# Patient Record
Sex: MALE | Race: WHITE | NOT HISPANIC OR LATINO | Employment: OTHER | URBAN - METROPOLITAN AREA
[De-identification: names, ages, dates, MRNs, and addresses within clinical notes are randomized per-mention and may not be internally consistent; named-entity substitution may affect disease eponyms.]

---

## 2021-09-27 ENCOUNTER — VIRTUAL VISIT (OUTPATIENT)
Dept: NEUROLOGY | Facility: CLINIC | Age: 66
End: 2021-09-27
Payer: COMMERCIAL

## 2021-09-27 DIAGNOSIS — Z82.0 FAMILY HISTORY OF MYOTONIC DYSTROPHY: Primary | ICD-10-CM

## 2021-09-27 DIAGNOSIS — Z71.83 ENCOUNTER FOR NONPROCREATIVE GENETIC COUNSELING AND TESTING: ICD-10-CM

## 2021-09-27 DIAGNOSIS — R29.6 FALLS FREQUENTLY: ICD-10-CM

## 2021-09-27 DIAGNOSIS — Z13.71 ENCOUNTER FOR NONPROCREATIVE GENETIC COUNSELING AND TESTING: ICD-10-CM

## 2021-09-27 PROCEDURE — 99207 PR NO CHARGE LOS: CPT | Performed by: GENETIC COUNSELOR, MS

## 2021-09-27 PROCEDURE — 96040 PR GENETIC COUNSELING, EACH 30 MIN: CPT | Mod: TEL | Performed by: GENETIC COUNSELOR, MS

## 2021-09-27 NOTE — Clinical Note
Willis Bergman,  This patient currently has a telephone visit with me tomorrow at 8:30 AM. Would you be able to reschedule this for next Friday, 11/19, at 8:30 AM? No need to call him to confirm. Thanks!

## 2021-09-27 NOTE — PROGRESS NOTES
Name:  Rajni Lara  :   1955  MRN:   [id number]  Date of service: Sep 27, 2021  Referring Provider: Jacoby Holbrook    Genetic Counseling Consultation Note    Presenting Information:  A consultation in the [facility] was requested for Rajni, a 65 year old male, for evaluation of family history of Myotonic Dystrophy Type 2.  This consultation was requested by Jacoby Holbrook on 2021.    Rajni attended this visit conducted by phone alone.    History is obtained from Rajni and the medical record. I met with the family to obtain a personal and family history, discuss possible genetic contributions to his symptoms, and to obtain informed consent for genetic testing.    Personal History:   Rajni does not have a clinical diagnosis of Myotonic Dystrophy and he has never met with a Neurologist. He does report a history of falls. He reports that he takes a backwards fall about once every 3 months because he misses a step or isn't paying close attention. This isn't a new problem for Rajni, it has been happening for many years. Rajni reports some overall balance concerns and dizziness. Rajni has trouble getting up off of the floor, he has to get into a 4-point stance to get up.    Rajni has a longstanding cardiac history including non-ischemic cardiomyopathy which has resolved. He has a longstanding history of atrial flutter and has undergone 2 cardioversion's. He has not needed to meet with a Cardiologist for the past few years.    Rajni has bilateral cataracts and hearing loss. He reports that he is tired during the day and falls asleep immediately at night.    Relevant Imaging    Echocardiogram    2008    CONCLUSION: 1. Mild reduction in global LV systolic function with EF visually  estimated at 40-45%. 2. Mild dilatation of all four cardiac chambers. 3. No pericardial effusion. 4. Mild mitral insufficiency. 5. No prior study for comparison.    2/10/2009    Diastolic flow  patterns are consistent with normal LV relaxation and filling pressures. The left ventricular systolic function is normal. Ejection Fraction is estimated at  50-55%. There is trace mitral regurgitation. Compared to prior study dated 12/17/08, changes are noted.       3/20/2009    Diastolic flow patterns are consistent with normal LV relaxation and filling pressures. The left ventricular systolic function is normal. Ejection Fraction is estimated at  50-55%. There is trace mitral regurgitation. Compared to prior study dated 12/17/08, changes are noted.     6/28/2011    CONCLUSION: 1. Mild to moderate left atrial dilatation. 2. Moderate right atrial dilatation. 3. Global LV systolic function is normal. 4. No structural cardiac valve abnormality. 5. No pericardial effusion. 6. When compared to prior study dated 3/20/2009, there is no significant interval change.     8/1/2011    CONCLUSION: 1. Mild to moderate left atrial dilatation. 2. Moderate right atrial dilatation. 3. Global LV systolic function is normal. 4. No structural cardiac valve abnormality. 5. No pericardial effusion. 6. When compared to prior study dated 3/20/2009, there is no significant interval change.     Previous Genetic Testing  Rajni has never had genetic testing.    Family History:   A standard three generation pedigree was obtained and is scanned into the medical record.  History pertinent to referral is underlined.    Children:     Son, healthy. Reported to have negative genetic testing    Daughter, healthy. Reported to have negative genetic testing    2 healthy grandsons    Daughter, healthy. No history of genetic testing    Healthy grandson and granddaughter    Siblings:     Full siblings:     59 y/o brother, healthy    2 healthy nieces    65 y/o sister, history of Myotonic Dystrophy Type 2 and positive genetic test results for Myotonic Dystrophy Type 2 (Rajni emailed me a copy of these). Diagnosed at 61/63 y/o with symptom onset in 40s/50s  (falls and leg weakness). No diabetes or cardiac problems.    30 y/o nephew, healthy    Niece, healthy    Paternal:     Father: 91 y/o, healthy    Paternal aunts/uncles:     93 y/o uncle, healthy    Maternal:     Mother:  at 79 y/o d/t unknown cause. History of frequent falls and no leg strength. Presumed to have Myotonic Dystrophy Type 2    Maternal grandfather:  at 60 y/o d/t MI. History of leg weakness and pain which began in his mid 40s/50s. Presumed to have Myotonic Dystrophy Type 2    Maternal grandmother:  in 90s d/t natural causes. Potential history of cancer    Maternal aunts/uncles:     Uncle  at 67 y/o d/t heart attack. History of frequent falls and balance issues. Presumed to have Myotonic Dystrophy Type 2    Male first cousin in 50s. Limited information, but thought to have Myotonic Dystrophy Type 2 with symptom onset in 30s    Female first cousin, no information    There are no additional reports of family members with history of muscle weakness, pain, falls, cardiac problems, diabetes, autism, intellectual disability, developmental delays, birth defects, sudden cardiac death, history of genetic testing or known genetic condition. Paternal and maternal ancestry is of [facility] descent. Consanguinity is denied.     Genetic Counseling Discussion:  Myotonic Dystrophy Type 2 (DM2) is a rare genetic condition that affects many different body systems.  DM2 is primarily thought of as a neuromuscular condition with myotonia (70-90% of patients) and various muscle dysfunction (100% of patients).  Other less common symptoms of DM2 include subcapsular cataracts (50-80% of patients), cardiac conduction defects (10-20% of patients), and type 2 diabetes mellitus (25-75% of patients).  Other symptoms can include hyperhydrosis (20-30% of patients) and early-onset male frontal balding (20-50% of patients).  The onset of symptoms varies, but is most common in the 3rd to 4th decades of life.    DM2 is  caused by a repeat expansion of CCTG in the CNBP gene.  Disease causing repeat expansions are greater than 75 CCTG repeats. Unlike many repeat disorders, the size of the repeat expansion does not correlate with age of symptom onset.  DM2 is an autosomal dominant disorder.  Each person has two copies of their CNBP gene, they inherit one copy from their mother and one copy from their father.  An individual with DM2 has one normal copy of their CNBP gene and one copy with an expansion.  To date, all individuals with DM2 inherited the repeat expansion in the CNBP gene from a parent.  This means that for an individual identified to have DM2 from a genetic basis, each of their siblings and children has a 50% chance of inheriting the repeat expansion in CNBP and having DM2.  Genetic testing for DM2 is incredibly accurate, nearly 100% of individuals with DM2 and a pathogenic variant will be found to have an expansion in the CNBP gene; this test has a high detection rate.    Surveillance/Treatment:    Neurology  o Annually  o Weakness and myotonia    Occupational Therapy  o Annually    Physical therapy  o Annually  o Assistive devices for weakness    Ophthalmology  o Annually  o Evaluation for posterior subcapsular cataracts and epiretinal membranes    Cardiology  o Annually  o ECG, echocardiogram, 24-hour Holter monitoring, cardiac MRI    Endocrinology  o Annually  o Fasting serum glucose concentration, glycosylated hemoglobin level, thyroid hormone levels, vitamin D, serum testosterone, FSH    Based on the family history information provided (Myotonic Dystrophy Type 2 in Rajni's sister and mother), there is a 1/2 or 50% chance that Rajni also has a repeat expansion in CNBP and is at risk for symptoms of Myotonic Dystrophy. There is a 1/2 or 50% risk that Rajni does not have a repeat expansion in CNBP.     We reviewed that this testing is medically necessary based on Rajni's family history of Myotonic Dystrophy 2 and his  "personal symptoms. The results of genetic testing will direct guide Omarysol's medical history and are medically actionable. This information can also be used to clarify risks to family members.    Plan:  1. Rajni expressed verbal informed consent to proceed with genetic testing (CNBP repeat analysis).  I will mail a buccal collection kit to their home address.  Rajni will follow the included instructions and mail back to the laboratory.     The laboratory will conduct a benefits investigation for genetic testing.  If the estimated out of pocket cost is less than $100, genetic testing will commence immediately.  If the estimated out of pocket cost is greater than $100, Rajni will be contacted via phone call asking if he would like to proceed.  Rajni will be contacted 3 times.  If Rajni does not respond to these messages, genetic testing will be cancelled. Rajni is aware that this is only an estimation of benefits.    2. The results of genetic testing are available ~4 weeks after the sample is received by the laboratory.  I will call Rajni with the results when they are available.  3. Results will be disclosed on 11/12/21 at 8:30 AM.  4. Contact information provided.    Kamila Silva MS [facility]  Genetic Counselor  Email: [email or web address]  Phone: [phone]  Pager: [phone]    Total Time Spent in Consultation: Approximately 45 minutes    CC: No Letter    References:  CROW Mace., et al. \"Myotonic dystrophy type 2: molecular, diagnostic and clinical spectrum.\" Neurology 60.4 (2003): 657-664.    Luis SWANSON. Myotonic Dystrophy Type 2. 2006 Sep 21 [Updated 2020 Mar 19]. In: Jimbo BRUCE, Whitney HH, Cailin RA, et al., editors. Gene[employer]s  [Internet]. [place] ([place]): [facility]; [phone].    Addendum 11/11/2021  Results are not anticipated until 11/17/2021 per [facility]. Results disclosure visit moved to 11/19/21 at 8:30 AM  "

## 2021-09-27 NOTE — PROGRESS NOTES
Rajni is a 65 year old who is being evaluated via a billable telephone visit.      What phone number would you like to be contacted at? [phone]  How would you like to obtain your AVS? Mail a copy  Phone call duration:  minutes

## 2021-09-27 NOTE — Clinical Note
Willis Bergman,  Can you please create a telephone visit (15 min) for this patient on 11/12/21 at 8:30 AM? Thanks! It's a results disclosure.

## 2021-10-17 ENCOUNTER — HEALTH MAINTENANCE LETTER (OUTPATIENT)
Age: 66
End: 2021-10-17

## 2021-11-30 ENCOUNTER — TELEPHONE (OUTPATIENT)
Dept: CONSULT | Facility: CLINIC | Age: 66
End: 2021-11-30
Payer: COMMERCIAL

## 2021-11-30 NOTE — TELEPHONE ENCOUNTER
I called Rajni to discuss the results of genetic testing.  Our initial consultation occurred on 9/27/2021 where informed consent was obtained for genetic testing.    The results of CNBP repeat analysis were positive. 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 A repeat size of >75 was detected which is consistent with a pathogenic variant. A normal repeat seize of <25 was also     Myotonic Dystrophy Type 2 (DM2) is a rare genetic condition that affects many different body systems.  DM2 is primarily thought of as a neuromuscular condition with myotonia (70-90% of patients) and various muscle dysfunction (like weakness or stiffness, 100% of patients).  Other less common symptoms of DM2 include subcapsular cataracts (50-80% of patients), cardiac conduction defects (10-20% of patients), type 2 diabetes mellitus (25-75% of patients), and endocrine abnormalities (20% of patients).  Individuals with DM2 most commonly experience myotonia in the 3rd to 4th decades of life in combination with muscle pain and proximal and axial weakness of the neck and hip flexors.  Nearly everyone with DM2 will experience symptoms if they live to a typical age (~100% age-dependent penetrance).    Rajni indicated that he assumed these results would be positive. His questions center around what the next steps are (Neurology visit, etc.)    Personal History:   For additional details, review note on 9/27/2021 from myself.  To summarize, Rajni has a history of falls (once per every 3 months for several years), overall balance concerns and dizziness. Rajni has a longstanding cardiac history including non-ischemic cardiomyopathy which has resolved. He has a longstanding history of atrial flutter and has undergone 2 cardioversion's. He has not needed to meet with a Cardiologist for the past few years. Rajni has bilateral cataracts and hearing loss. He reports that he is tired during the day and falls asleep immediately at  night.    Family History:   A standard three generation pedigree was obtained and is scanned into the medical record at our consultation on 2021.    Healthy son, reported to have negative genetic testing    Healthy daughter, reported to have negative genetic testing    Healthy daughter, no history of genetic testing[place]    Healthy 61 y/o brother, no history of genetic testing    63 y/o sister, history of Myotonic Dystrophy Type 2 confirmed via genetic testing     mother with history of frequent falls and no leg strength, presumed to have Myotonic Dystrophy Type 2    Additional maternal relatives thought to have Myotonic Dystrophy Type 2     Recurrence Risk:  DM2 is caused by a repeat expansion of CCTG in the CNBP gene (sometimes referred to as the ZNF9 gene).  Disease causing repeat expansions are greater than 75 CCTG repeats. Unlike many repeat disorders like Myotonic Dystrophy Type 1, the size of the repeat expansion does not correlate with age of symptom onset.  DM2 is an autosomal dominant disorder.  Each person has two copies of their CNBP gene, they inherit one copy from their mother and one copy from their father.  An individual with DM2 has one normal copy of their CNBP gene and one copy with an expansion.  To date, all individuals with DM2 inherited the repeat expansion in the CNBP gene from a parent.  This means that for an individual identified to have DM2, each of their siblings and children has a 50% chance of inheriting the repeat expansion in CNBP and having DM2.  Genetic testing for DM2 is incredibly accurate, nearly 100% of individuals with DM2 and a pathogenic variant will be found to have an expansion in the CNBP gene; this test has a high detection rate.    We discussed that Rajni's family members are at risk for Myotonic Dystrophy Type 2 and the associated symptoms. Rajni untested daughter and untested brother who are at risk for Myotonic Dystrophy Type 2 who, at  minimum, should discuss this information with their primary care provider. These relatives could also consider genetic testing.    Medical Management:  Because DM2 is a condition that can affect many body systems, numerous referrals may be made by Rajni's neurologist.  These may include the following:    Neurology  ? Annually  ? Weakness and myotonia    Occupational Therapy  ? Annually    Physical therapy  ? Annually  ? Assistive devices for weakness    Ophthalmology  ? Annually  ? Evaluation for posterior subcapsular cataracts and epiretinal membranes    Cardiology  ? Annually  ? ECG, echocardiogram, 24-hour Holter monitoring, cardiac MRI    Endocrinology  ? Annually  ? Fasting serum glucose concentration, glycosylated hemoglobin level, thyroid hormone levels, vitamin D, serum testosterone, FSH    General  ? 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    Plan:   1. I will mail Rajni a copy of their test results along with a letter summarizing these results. Rajni should share this information with his other healthcare teams.  2. Rajni would like to establish care with our Neurology team here at the [facility]. I will start facilitating this process.    Kamila Silva MS [facility]  Genetic Counselor  Email: [email or web address]  Phone: [phone]  Pager: [phone]    Total Time Spent in Consultation: Approximately 25 minutes    CC: No Letter    References:  Luis SWANSON. Myotonic Dystrophy Type 2. 2006 Sep 21 [Updated 2020 Mar 19]. In: Jimbo MP, Whitney HH, Cailin RA, et al., editors. [employer]  [Internet]. [place] ([place]): [facility] [place]; [phone].

## 2021-11-30 NOTE — LETTER
November 30, 2021      TO: Rajni Lara  [address] [facility]  [place] [address]         Dear Rajni,    It was a pleasure meeting with you on November 30, 2021 in the Genetics and Metabolism Clinic at the [facility] for discussion of your recent genetic test results.  The purpose of this letter is to summarize the information we discussed and serve as a reference to yourself and other healthcare providers.    You can provide the supplemental letter to your family members to help them better understand your genetic condition and their risks.    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 A normal repeat seize of <25 was also detected.    We recognize that a new diagnosis of Myotonic Dystrophy Type 2 can bring many emotions, questions and concerns.  There are many great resources available.  Two are described below.    The Myotonic Dystrophy Foundation is a support organization specific to both types of Myotonic Dystrophy.  They have education materials, ways to become involved in the community and meet with families, and a monthly newsletter that you can sign up for.  Additionally, they have several toolkits to help you navigate health insurance considerations, applying for social security disability benefits, employment access, and much more.  o https://www.myotonic.org/  o https://www.myotonic.org/myotonic-dystrophy-start-your-journey-here    The Muscular Dystrophy Association (MDA) is a support organization that can provide many services to patients and families.  Their website is not specific to DM2, but they do have some information on this.  o http://mda.org    If you have not already, please establish care with a Neurologist who is familiar with this condition.  They will be able to make appropriate recommendations to best care for your health.  Share this information with your primary care  "provider as well.    It has a pleasure participating in your care.  While we do not have a formal follow-up visit scheduled, I am happy to answer any questions that you have.  My contact information is provided below.    Sincerely,                Kamila Silva MS [facility]  Licensed and Certified Genetic Counselor  IMTIAZ Pedro  Email: [email or web address]  Phone: [phone]      Dear Family Member,      You are receiving this letter because one of your family members was recently diagnosed with a genetic condition.  As a result, you are at risk to have the same genetic condition.  This condition is called Myotonic Dystrophy Type 2.  Please see below for details.  This letter contains a large amount of information.  Please read it carefully and share it with your primary care doctor, as it has implications for your health.      What is Myotonic Dystrophy Type 2?  Myotonic Dystrophy Type 2 is commonly abbreviated as  DM2.\"  It is a genetic condition that affects multiple body systems, including our muscles, eyes, heart, endocrine system, and central nervous system.  However, the symptoms of DM2 vary from person to person; even in the same family, not all individuals with DM2 will have similar severity of symptoms or age of onset.    Generally, DM2 is characterized by progressive loss of muscle mass and muscle weakness, especially in the hands, feet, face and neck. This condition is often associated with prolonged muscle contractions (myotonia) which most often occurs in the hands and makes releasing  difficult during activities such as opening doors and shaking hands with others. DM2 is also associated with development of cataracts early in life, frontal balding or hair thinning, abnormal heart rhythms, sleep apnea, and decreased muscle function in the digestive system that can lead to difficulty swallowing and constipation. Increased risk for type 2 diabetes and abnormal hormone levels have been seen in " individuals with DM2. There is also risk for poor reactions to anesthesia, so individuals with DM2 should share this diagnosis with their doctors and anesthesiologist prior to any surgery.     Importantly, there is another form of Myotonic Dystrophy that was not found in your family called Myotonic Dystrophy Type 1.  While these conditions are very similar, there are some key differences.  DM2 is sometimes described as less severe than DM1, although this is not meant to downplay health concerns individuals with DM2 face.  DM1 is associated with earlier onset with each generation affected and there is a severe infantile form of disease, while DM2 is only known to affect adults.    What causes Myotonic Dystrophy Type 2?  To answer this question, we need to review some basic biology and genetics information.  Our bodies are made up of cells.  These cells contain our chromosomes which are the structures that contain our genes.  Our genes create the proteins that help our bodies grow and function properly.  Our genes are made up of letters; As, Ts, Gs, and Cs.  We have 2 copies of almost every gene, and we inherit 1 copy from our mother and 1 copy from our father.  Myotonic Dystrophy Type 2 is caused by a change in one of our genes, specifically, the CNBP gene.    Everyone has a certain number of repeats of CCTG in their CNBP gene.  Individuals without DM2 have 26 or less repeats.  Individuals with DM2 have 75 or more repeats.  When there are 75 or more repeats, this causes the instructions in the CNBP gene to not work properly and results in the symptoms of DM2.  It's sometimes referred to as a repeat expansion.  The number of CCTG repeats an affected person has in their CNBP gene is generally NOT correlated with age of onset and severity of symptoms, which is unlike Myotonic Dystrophy Type 1.              How does Myotonic Dystrophy Type 2 run in families?  Myotonic Dystrophy Type 2 is an autosomal dominant condition.   This means that only 1 out of the 2 copies of the CNBP gene needs to have the change in the gene or repeat expansion.  This means that the repeat expansion is typically inherited from a parent.  It also means that the children of someone identified with DM2 are at risk to have children who are affected.  For each of their children, there is a   or 50% chance to inherit the repeat expansion and have DM2.    Essentially, each of first-degree relative of someone with DM2 has a 50% chance of having the same genetic change.  A first-degree relative is a child, sibling, or parent.  Thus, children, siblings, and parents to an individual with DM2 are thought to have a 50% or   chance of having the same condition.    Some families choose to prevent passing down a genetic condition to future generations. Now that the cause of Ojars and other family member's symptoms are known, additional pregnancy planning options are available. If family members are interested in learning more about these options, they can meet with a prenatal genetic counselor.    Why is it important for at risk family members to be tested for Myotonic Dystrophy Type 2?  At-risk family members should have genetic testing because it is the best way to know if someone does or does not have a risk to develop DM2 and the associated symptoms. Early identification and screening for this condition (before the onset of symptoms) is important because this allows doctors to identify and treat symptoms early.  Additionally, if an individual is affected with DM2, there is a 50% chance that they will pass this condition on to their children.  Genetic testing for DM2 is not recommended for individuals under the age of 18 because they are not expected to experience symptoms related to DM2 and should thus be able to make the choice about genetic testing when they are considered a legal adult.    Testing starts with meeting a genetic counselor to review the personal and  family history and discuss the benefits and limitations of genetic testing.  At this time, we also review insurance coverage for genetic testing.  Genetic testing requires a blood draw, or a saliva sample and results are typically obtained in 3 weeks.  If you are in [place], the genetic counseling team at the [facility] can facilitate this process, please see contact information below.  If you are located outside of [place], you can find a genetic counselor via [email or web address] or go through a telehealth company like [employer] ([email or web address]).  Even if you are not interested in genetic testing, you can meet with a genetic counselor to further clarify your risks.    I know I am at risk for Myotonic Dystrophy Type 2, so what are my next steps?  If you are receiving this information because a family member was identified to have DM2, there options available to you moving forward.    At minimum, share this information with your primary care provider.  This information will allow them to properly care for you and protect your health. They may order an annual EKG to monitor for risk of cardiac conduction abnormalities.  They may monitor your glucose and thyroid function.  Individuals with DM2 are at increased risk for complications from general anesthesia, even if they have very mild symptoms of DM2.  Make sure your family history of DM2 is known before any operations so pre-anesthetic assessment can be performed, and close monitoring is made during and after sedation.                Supplemental Information:  There is a federal law in placed called the Genetic Information Nondiscrimination Act (MARGARETH) of 2008.  MARGARETH is a federal law and applies to all [place] States. MARGARETH prohibits discrimination in health coverage and employment (employees and applicants) because of genetic information.  Genetic information refers to an individual's genetic tests  and family medical history (including family member's genetic tests).  For example, health insurers cannot request or require genetic information from a patient or the patient's family members or use this information for decisions regarding coverage, rates, or preexisting conditions.  It is illegal for a patient's health insurer to use family health history and genetic test results as a reason to deny health insurance or decide costs of health insurance.    There are exceptions to MARGARETH.  Most importantly, MARGARETH does not prevent health insurers from making decisions about eligibility and premiums based on a person's current symptoms or diagnosis of disease.  MARGARETH's protection applies to employers of 15 or more employees.  MARGARETH does not apply to life insurance, long term care insurance, and disability insurance.  MARGARETH does not apply to members of the [place]  who receive care through , Veterans who receive care through the VA, the [facility], and Federal Employees enrolled in the Federal Employee Health Benefits Plan.  These entities have other policies in place like MARGARETH.

## 2021-12-11 NOTE — TELEPHONE ENCOUNTER
RECORDS RECEIVED FROM: Internal   REASON FOR VISIT: Myotonic Dystrophy Type 2   Date of Appt: 1/10/22   NOTES (FOR ALL VISITS) STATUS DETAILS   OFFICE NOTE from referring provider Internal Kamila Silva GC @ [facility] Neurology:  9/27/21   OFFICE NOTE from other specialist N/A    DISCHARGE SUMMARY from hospital N/A    DISCHARGE REPORT from the ER N/A    OPERATIVE REPORT N/A    MEDICATION LIST Internal    IMAGING  (FOR ALL VISITS)     EMG N/A    EEG N/A    LUMBAR PUNCTURE N/A    IVON SCAN N/A    ULTRASOUND (CAROTID BILAT) *VASCULAR* N/A    MRI (HEAD, NECK, SPINE) N/A    CT (HEAD, NECK, SPINE) N/A

## 2022-01-10 ENCOUNTER — LAB (OUTPATIENT)
Dept: LAB | Facility: CLINIC | Age: 67
End: 2022-01-10
Payer: COMMERCIAL

## 2022-01-10 ENCOUNTER — OFFICE VISIT (OUTPATIENT)
Dept: NEUROLOGY | Facility: CLINIC | Age: 67
End: 2022-01-10
Payer: COMMERCIAL

## 2022-01-10 ENCOUNTER — PRE VISIT (OUTPATIENT)
Dept: NEUROLOGY | Facility: CLINIC | Age: 67
End: 2022-01-10

## 2022-01-10 VITALS
OXYGEN SATURATION: 100 % | SYSTOLIC BLOOD PRESSURE: 135 MMHG | DIASTOLIC BLOOD PRESSURE: 84 MMHG | WEIGHT: 222 LBS | HEART RATE: 56 BPM | RESPIRATION RATE: 16 BRPM

## 2022-01-10 DIAGNOSIS — G71.11 MYOTONIC DYSTROPHY, TYPE 2 (H): ICD-10-CM

## 2022-01-10 DIAGNOSIS — R13.10 DYSPHAGIA, UNSPECIFIED TYPE: ICD-10-CM

## 2022-01-10 DIAGNOSIS — G47.19 EXCESSIVE DAYTIME SLEEPINESS: ICD-10-CM

## 2022-01-10 DIAGNOSIS — G71.11 MYOTONIC MUSCULAR DYSTROPHY (H): Primary | ICD-10-CM

## 2022-01-10 DIAGNOSIS — R63.5 ABNORMAL WEIGHT GAIN: ICD-10-CM

## 2022-01-10 DIAGNOSIS — G71.11 MYOTONIC DYSTROPHY, TYPE 2 (H): Primary | ICD-10-CM

## 2022-01-10 DIAGNOSIS — G95.9 CERVICAL MYELOPATHY (H): ICD-10-CM

## 2022-01-10 DIAGNOSIS — G47.30 SLEEP APNEA, UNSPECIFIED TYPE: ICD-10-CM

## 2022-01-10 LAB — TSH SERPL DL<=0.005 MIU/L-ACNC: 2.6 MU/L (ref 0.4–4)

## 2022-01-10 PROCEDURE — 84443 ASSAY THYROID STIM HORMONE: CPT | Performed by: PATHOLOGY

## 2022-01-10 PROCEDURE — 94375 RESPIRATORY FLOW VOLUME LOOP: CPT | Performed by: INTERNAL MEDICINE

## 2022-01-10 PROCEDURE — 99204 OFFICE O/P NEW MOD 45 MIN: CPT | Performed by: PSYCHIATRY & NEUROLOGY

## 2022-01-10 PROCEDURE — 36415 COLL VENOUS BLD VENIPUNCTURE: CPT | Performed by: PATHOLOGY

## 2022-01-10 RX ORDER — ASPIRIN 81 MG/1
81 TABLET, CHEWABLE ORAL DAILY
COMMUNITY
End: 2023-01-29

## 2022-01-10 RX ORDER — FINASTERIDE 5 MG/1
1 TABLET, FILM COATED ORAL EVERY OTHER DAY
Status: ON HOLD | COMMUNITY
Start: 2021-11-06 | End: 2023-05-01

## 2022-01-10 RX ORDER — TAMSULOSIN HYDROCHLORIDE 0.4 MG/1
CAPSULE ORAL
COMMUNITY
Start: 2021-12-03

## 2022-01-10 RX ORDER — NYSTATIN AND TRIAMCINOLONE ACETONIDE 100000; 1 [USP'U]/G; MG/G
OINTMENT TOPICAL 2 TIMES DAILY
COMMUNITY

## 2022-01-10 RX ORDER — ACETAMINOPHEN 500 MG
1000 TABLET ORAL
COMMUNITY

## 2022-01-10 ASSESSMENT — PAIN SCALES - GENERAL: PAINLEVEL: NO PAIN (0)

## 2022-01-10 NOTE — PATIENT INSTRUCTIONS
Labs today  Our physical and speech therapists will be contacting you.   If a slot is available we will try to get pulmonary function tests today.   Cardiology referral to the [facility]  Sleep Medicine referral, you can do this in [facility]  Continue seeing your Eye doctor once a year  Follow up 1 year

## 2022-01-10 NOTE — NURSING NOTE
Chief Complaint   Patient presents with     Consult     [facility] MUSC[facility] DYSTROPHY     Micah Bearden

## 2022-01-10 NOTE — PROGRESS NOTES
Service Date: 01/10/2022    Christopher Field MD  [facility]  [address] [place] [id number]    RE:  Rajni Lara  MRN: [id number]  : 1955    Dear Dr. Field:    I had the pleasure to see Mr. Lara at the [facility].  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  He underwent testing on 10/24/2021.  He met our genetic counselor as he has a strong family history of this condition affecting his sister, presumably mother and maternal grandfather.    He began noticing some proximal leg weakness about 3-4 years ago.  It is worse in the last 12 months.  He has difficulty getting up from low-seated chairs, from the ground when he plays with his grandkids , nd ascending stairs, which requires using a handrail now. He occasionally will trip on his foot with walking.  He has had 2 falls in total, one about 13 months ago and one six months ago in his garage, but none since.  He has a cane, but does not use it regularly. He endorses some mild weakness of his proximal arm muscles, especially when lifting heavy objects and trying to put them on shelves.  He cannot put on a tall shelf anything more than 5-10 pounds now.  He does not report any difficulty gripping objects and he does not have striking hand  myotonia.  Rarely, his  will get stuck when he gives a hand shake, but that is uncommon.      He has prominent hand tremor, which is benign/essential and this affects his dexterity and causing some clumsiness occasionally dropping things.  He also reports some difficulty swallowing.  When he swallows hamburgers and French fries quickly, he feels like he cannot breathe and this actually happened 13 months ago and led to a syncopal episode.  This does occur a couple times a week and is disturbing as it can happen with liquids too.  He has not had a formal speech-swallow evaluation.      He has cataracts in both eyes operated in 2019.  He  "is following with an eye doctor annually.  He does not take naps during the day, but feels easily fatigued and falls immediately asleep when he is about to.  He describes his sleep as refreshing.  His wife tells me he does snore to a moderate degree and he is \"gasping for air\" at times.  He does not have restless leg syndrome. He has never had a sleep study.  He does not wake up in the morning with headaches.  He denies orthopnea or significant dyspnea on exertion.      He has a history of atrial flutter that was successfully ablated in the past and there was a history of nonischemic cardiomyopathy with reduced EF seen on prior echos, which according to the patient has resolved- his echo normalized and he does not take any cardiologic drugs.  He was on anticoagulation before ablation of the flutter for 6-8 weeks, as well as 4 weeks thereafter.  He does not have prominent GI complaints except for occasional constipation, which he feels could be caused by his prostate drugs.  He has urinary retention and occasional leaks, which are caused by BPH and finasteride has helped a lot.      He does not have any history of diabetes.  He had a hemoglobin A1c checked in 09/2021 that was normal 5.3.  Lipid panel showed mildly elevated triglycerides at 166, but LDL was 111.  Comprehensive metabolic panel was essentially normal except for a BUN to creatinine ratio of 25 and a minimal elevation of AST at 44.    /84   Pulse 56   Resp 16   Wt 100.7 kg (222 lb)   SpO2 100%     NEUROLOGIC EXAMINATION:  He is awake, alert and oriented x3.  He is able to provide a coherent history.  Cranial nerves show no ptosis or ophthalmoparesis.  Ductions and versions of the eyes are normal.  There is no weakness of orbicularis oculi, oris, uvula, jaw, palate or tongue.  No dysphonia or dysarthria.  His neck flexion is 4/5, extension is full.  Strength for deltoid is 5- bilaterally, biceps are bilaterally 5, triceps bilaterally 4.  Wrist " extensors, finger extensors, FDI, APB and hand  are bilaterally 5.  Hip flexors are bilaterally 4, hip abduction is 4+, knee extension, flexion and foot dorsiflexion are bilaterally 5. Tone is normal in the upper and lower extremities.  Reflexes are 3+ brisk in biceps, triceps, brachioradialis, knees and ankles with vertical spread in all.  There is no ankle clonus and toes are downgoing; left equivocal, mute right.  He does not have a brisk jaw jerk.  There are no fasciculations.  I did not detect any hand  myotonia.  There was perhaps very subtle percussion myotonia of the thenar and the EDC bilaterally.  Sensory exam showed reduced vibration at the toes- only a couple of seconds-and it was normal at the medial malleoli and upper extremities.  Position sensation was intact at the toes.  Finger-to-nose was done without dysmetria, although there was quite prominent postural and kinetic tremor bilaterally consistent with essential tremor.  He could not get up from a chair with arms crossed on the chest.  He had to use his arms to push.  His gait was fairly unremarkable, not spastic or Trendelenburg.  Romberg was equivocal and he had slight difficulties doing tandem gait.    In summary, Mr. Lara has myotonic dystrophy type 2.  We discussed a number of practical issues and expected natural history, diagnosis and prognosis of DM2.  I told him that this is a hereditary disease, which is autosomal dominant, so his children have a 50% chance of getting this trait.  I explained that this is a muscular dystrophy with onset in later life with weakness beginning usually in the 40s-50s and is slowly progressive.  A number of people can stay ambulatory until the later stage of the disease; however, there is no disease-modifying treatment currently available.  I also explained to him that this is a multisystem disorder and for this reason, surveillance of multiple systems is required.  I will refer him to our  Cardiology Department at the [facility] who has an interest in neuromuscular diseases and they may order a cardiac MRI or Holter per their judgment.  He should see his eye doctor annually.  I will also refer him to a Sleep Clinic locally in [place] because he is complaining of snoring and his wife as noted some gasping for air at night.  He may have sleep apnea and he probably has also excessive daytime sleepiness, based on his report.  Those problems are common in myotonic dystrophy type 2 and should be addressed.  I will check a TSH.  Otherwise, he had complete labs a few months ago that were unrevealing.  Hemoglobin A1c and metabolic panel probably should be repeated annually.     He does not have any major GI complaints.  Regarding his proximal leg weakness, I will ask our physical therapist to assess him remotely and give some advice on optimal exercise and need for continued outpatient therapies if deemed necessary.  It is also important that our speech therapist assess him remotely.  If they think that he needs a video swallow study, this could be done locally in [place].  We should also obtain PFT (spirometry) today if possible.     Lastly, I noted that he has hyperreflexia not including the jaw, and loss of vibration at the toes with unsteady gait. This could be due to cervical myelopathy and for that reason I will obtain a cervical spine MRI.     Follow up in 1 year or sooner if needed.    Total time spent on this encounter today was 53 minutes including 33 face-to-face, 10 on post-visit note dictation, editing and orders, 10 on pre-visit chart review.    Sincerely,    Reinaldo Rodriguez MD        D: 01/10/2022   T: 01/10/2022   MT: LAUREN    Name:     DERIAN MORALES  MRN:      [id number]        Account:      [id number]   :      1955           Service Date: 01/10/2022       Document: [id number]    cc:  Christopher Field MD

## 2022-01-10 NOTE — LETTER
1/10/2022       RE: Rajni Lara  [address]   [place] [address]     Dear Colleague,    Thank you for referring your patient, Rajni Lara, to the [facility] [place] at [facility]. Please see a copy of my visit note below.    Service Date: 01/10/2022    Christopher Field MD  [facility]  [address]  [place] [address]    RE:  Rajni Lara  MRN: [id number]  : 1955    Dear Dr. Field:    I had the pleasure to see Mr. Lara at the [facility].  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  He underwent testing on 10/24/2021.  He met our genetic counselor as he has a strong family history of this condition affecting his sister, presumably mother and maternal grandfather.    He began noticing some proximal leg weakness about 3-4 years ago.  It is worse in the last 12 months.  He has difficulty getting up from low-seated chairs, from the ground when he plays with his grandkids , nd ascending stairs, which requires using a handrail now. He occasionally will trip on his foot with walking.  He has had 2 falls in total, one about 13 months ago and one six months ago in his garage, but none since.  He has a cane, but does not use it regularly. He endorses some mild weakness of his proximal arm muscles, especially when lifting heavy objects and trying to put them on shelves.  He cannot put on a tall shelf anything more than 5-10 pounds now.  He does not report any difficulty gripping objects and he does not have striking hand  myotonia.  Rarely, his  will get stuck when he gives a hand shake, but that is uncommon.      He has prominent hand tremor, which is benign/essential and this affects his dexterity and causing some clumsiness occasionally dropping things.  He also reports some difficulty swallowing.  When he swallows hamburgers and French fries quickly, he  "feels like he cannot breathe and this actually happened 13 months ago and led to a syncopal episode.  This does occur a couple times a week and is disturbing as it can happen with liquids too.  He has not had a formal speech-swallow evaluation.      He has cataracts in both eyes operated in 07/2019.  He is following with an eye doctor annually.  He does not take naps during the day, but feels easily fatigued and falls immediately asleep when he is about to.  He describes his sleep as refreshing.  His wife tells me he does snore to a moderate degree and he is \"gasping for air\" at times.  He does not have restless leg syndrome. He has never had a sleep study.  He does not wake up in the morning with headaches.  He denies orthopnea or significant dyspnea on exertion.      He has a history of atrial flutter that was successfully ablated in the past and there was a history of nonischemic cardiomyopathy with reduced EF seen on prior echos, which according to the patient has resolved- his echo normalized and he does not take any cardiologic drugs.  He was on anticoagulation before ablation of the flutter for 6-8 weeks, as well as 4 weeks thereafter.  He does not have prominent GI complaints except for occasional constipation, which he feels could be caused by his prostate drugs.  He has urinary retention and occasional leaks, which are caused by BPH and finasteride has helped a lot.      He does not have any history of diabetes.  He had a hemoglobin A1c checked in 09/2021 that was normal 5.3.  Lipid panel showed mildly elevated triglycerides at 166, but LDL was 111.  Comprehensive metabolic panel was essentially normal except for a BUN to creatinine ratio of 25 and a minimal elevation of AST at 44.    /84   Pulse 56   Resp 16   Wt 100.7 kg (222 lb)   SpO2 100%     NEUROLOGIC EXAMINATION:  He is awake, alert and oriented x3.  He is able to provide a coherent history.  Cranial nerves show no ptosis or " ophthalmoparesis.  Ductions and versions of the eyes are normal.  There is no weakness of orbicularis oculi, oris, uvula, jaw, palate or tongue.  No dysphonia or dysarthria.  His neck flexion is 4/5, extension is full.  Strength for deltoid is 5- bilaterally, biceps are bilaterally 5, triceps bilaterally 4.  Wrist extensors, finger extensors, FDI, APB and hand  are bilaterally 5.  Hip flexors are bilaterally 4, hip abduction is 4+, knee extension, flexion and foot dorsiflexion are bilaterally 5. Tone is normal in the upper and lower extremities.  Reflexes are 3+ brisk in biceps, triceps, brachioradialis, knees and ankles with vertical spread in all.  There is no ankle clonus and toes are downgoing; left equivocal, mute right.  He does not have a brisk jaw jerk.  There are no fasciculations.  I did not detect any hand  myotonia.  There was perhaps very subtle percussion myotonia of the thenar and the EDC bilaterally.  Sensory exam showed reduced vibration at the toes- only a couple of seconds-and it was normal at the medial malleoli and upper extremities.  Position sensation was intact at the toes.  Finger-to-nose was done without dysmetria, although there was quite prominent postural and kinetic tremor bilaterally consistent with essential tremor.  He could not get up from a chair with arms crossed on the chest.  He had to use his arms to push.  His gait was fairly unremarkable, not spastic or Trendelenburg.  Romberg was equivocal and he had slight difficulties doing tandem gait.    In summary, Mr. Lara has myotonic dystrophy type 2.  We discussed a number of practical issues and expected natural history, diagnosis and prognosis of DM2.  I told him that this is a hereditary disease, which is autosomal dominant, so his children have a 50% chance of getting this trait.  I explained that this is a muscular dystrophy with onset in later life with weakness beginning usually in the 40s-50s and is slowly  progressive.  A number of people can stay ambulatory until the later stage of the disease; however, there is no disease-modifying treatment currently available.  I also explained to him that this is a multisystem disorder and for this reason, surveillance of multiple systems is required.  I will refer him to our Cardiology Department at the [facility] who has an interest in neuromuscular diseases and they may order a cardiac MRI or Holter per their judgment.  He should see his eye doctor annually.  I will also refer him to a Sleep Clinic locally in [place] because he is complaining of snoring and his wife as noted some gasping for air at night.  He may have sleep apnea and he probably has also excessive daytime sleepiness, based on his report.  Those problems are common in myotonic dystrophy type 2 and should be addressed.  I will check a TSH.  Otherwise, he had complete labs a few months ago that were unrevealing.  Hemoglobin A1c and metabolic panel probably should be repeated annually.     He does not have any major GI complaints.  Regarding his proximal leg weakness, I will ask our physical therapist to assess him remotely and give some advice on optimal exercise and need for continued outpatient therapies if deemed necessary.  It is also important that our speech therapist assess him remotely.  If they think that he needs a video swallow study, this could be done locally in [place].  We should also obtain PFT (spirometry) today if possible.     Lastly, I noted that he has hyperreflexia not including the jaw, and loss of vibration at the toes with unsteady gait. This could be due to cervical myelopathy and for that reason I will obtain a cervical spine MRI.     Follow up in 1 year or sooner if needed.    Total time spent on this encounter today was 53 minutes including 33 face-to-face, 10 on post-visit note dictation, editing and orders, 10 on pre-visit chart review.      Reinaldo Rodriguez MD        D:  01/10/2022   T: 01/10/2022   MT: LAUREN    Name:     DERIAN MORALES  MRN:      [id number]        Account:      [id number]   :      1955           Service Date: 01/10/2022       Document: [id number]    cc:  Christopher Field MD         Again, thank you for allowing me to participate in the care of your patient.      Sincerely,    Reinaldo Rodriguez MD

## 2022-01-12 LAB
EXPTIME-PRE: 6.9 SEC
FEF2575-%PRED-PRE: 82 %
FEF2575-PRE: 2.45 L/SEC
FEF2575-PRED: 2.98 L/SEC
FEFMAX-%PRED-PRE: 92 %
FEFMAX-PRE: 9.2 L/SEC
FEFMAX-PRED: 9.9 L/SEC
FEV1-%PRED-PRE: 74 %
FEV1-PRE: 2.93 L
FEV1FEV6-PRE: 78 %
FEV1FEV6-PRED: 78 %
FEV1FVC-PRE: 78 %
FEV1FVC-PRED: 76 %
FIFMAX-PRE: 5.67 L/SEC
FVC-%PRED-PRE: 72 %
FVC-PRE: 3.78 L
FVC-PRED: 5.2 L
MEP-PRE: 77 CMH2O
MIP-PRE: -40 CMH2O

## 2022-01-20 ENCOUNTER — HOSPITAL ENCOUNTER (OUTPATIENT)
Dept: SPEECH THERAPY | Facility: CLINIC | Age: 67
Setting detail: THERAPIES SERIES
End: 2022-01-20
Attending: PSYCHIATRY & NEUROLOGY
Payer: COMMERCIAL

## 2022-01-20 PROCEDURE — 92610 EVALUATE SWALLOWING FUNCTION: CPT | Mod: GN | Performed by: SPEECH-LANGUAGE PATHOLOGIST

## 2022-01-20 PROCEDURE — 92526 ORAL FUNCTION THERAPY: CPT | Mod: GN | Performed by: SPEECH-LANGUAGE PATHOLOGIST

## 2022-01-27 NOTE — PROGRESS NOTES
Adult Outpatient Dysphagia Evaluation   01/20/22 1100       Present No   General Information   Type Of Visit Initial   Start Of Care Date 01/20/22   Referring Physician Reinaldo Rodriguez MD   Orders Evaluate And Treat   Orders Comment Dysphagia. Clinical Swallow Study.   Medical Diagnosis   (Myotonic Muscular Dystrophy)   Onset Of Illness/injury Or Date Of Surgery 01/10/22  (Order date.)   Precautions/limitations No Known Precautions/limitations   Hearing Bilateral hearing aids.   Pertinent History of Current Problem/OT: Additional Occupational Profile Info LESLIE presents with concerns of a sensation of tightness that occurs when he is eating dry, solid foods. This happens only when he is eating and occurs between 1-3 times/month. Approximately a year ago he had one of these episodes that resulted in OJ passing out and coming to with paramedics around him. Since then he has been very cautious and when he starts to feel the sensation starting he stops eating. He reports the sensation typically clears after about a minute.   Respiratory Status Room air   Prior Level Of Function Swallowing   Prior Level Of Function Comment Previously had no difficulty.   Patient Role/employment History Retired  (Retail pharmacist.)   Living Environment [place]  (With his wife, Desire.)   Patient/family Goals To find out if there's a way to avoid the sensation he feels when swallowing that has lead to him passing out.   Fall Risk Screen   Fall screen completed by SLP   Have you fallen 2 or more times in the past year? Yes   Have you fallen and had an injury in the past year? No   Is patient a fall risk? Yes   Fall screen comments LESLIE has a PT evaluation scheduled.   Abuse Screen (yes response referral indicated)   Feels Unsafe at Home or Work/School no   Feels Threatened by Someone no   Does Anyone Try to Keep You From Having Contact with Others or Doing Things Outside Your Home? no   Physical Signs of Abuse  Present no   Clinical Swallow Evaluation   Oral Musculature generally intact   Structural Abnormalities none present   Dentition present and adequate   Mucosal Quality good   Mandibular Strength and Mobility intact   Oral Labial Strength and Mobility WFL   Lingual Strength and Mobility WFL   Velar Elevation intact   Buccal Strength and Mobility intact   Laryngeal Function Cough;Throat clear;Swallow   Oral Musculature Comments No structural or strength concerns at this time.   Additional Documentation No   Additional evaluation(s) completed today No   Rationale for completing additional evaluation PO trials were not completed given that OJs symptoms are a sensation he feels with no outward physical signs.   VFSS Evaluation   VFSS Additional Documentation No   FEES Evaluation   Additional Documentation No   Educational Assessment   Barriers to Learning No barriers   Preferred Learning Style Reading  (To provide materials for later recall.)   Esophageal Phase of Swallow   Patient reports or presents with symptoms of esophageal dysphagia No   General Therapy Interventions   Planned Therapy Interventions Dysphagia Treatment   Dysphagia treatment Oropharyngeal exercise training   Intervention Comments Exercises to be recommended at a reduced rate given his diagnosis of Myotonic Muscular Dystrophy   Swallow Eval: Clinical Impressions   Skilled Criteria for Therapy Intervention Skilled criteria met.  Treatment indicated.   Functional Assessment Scale (FAS) 5   Dysphagia Outcome Severity Scale (GUERRERO) Level 5 - GUERRERO   Treatment Diagnosis Mild oropharyngeal dysphagia characterized by episodes of chest tightness.   Diet texture recommendations Regular diet;Thin liquids (level 0)   Rehab Potential good, to achieve stated therapy goals   Therapy Frequency other (see comments)  (Follow up in 3 weeks.)   Predicted Duration of Therapy Intervention (days/wks) 1 month   Anticipated Discharge Disposition home   Risks and Benefits of  Treatment have been explained. Yes   Patient, family and/or staff in agreement with Plan of Care Yes   Clinical Impression Comments LESLIE presents with mild oropharyngeal dysphagia characterized by episodes of chest tightness that in the past have lead to OJ losing consciousness. He has been managing this well by stopping eating when the sensation occurs and the sensation eventually clears. A trial of reduced frequency oropharyngeal exercises will be trialed to determine if they have any benefit for OJ.   Swallow Goals   SLP Swallow Goals 1   Swallow Goal 1   Goal Identifier Oropharyngeal Exercises   Goal Description LESLIE will complete oropharyngeal exercises (effortful swallow and tongue hold) with reduced frequency due to his medical diagnosis on a daily basis for 3 weeks to assess if a decrease can occur in the frequency of the sensation of tightness he experiences in his chest.   Target Date 02/20/22   Total Session Time   SLP Trish: oral/pharyngeal swallow function, clinical minutes (89825) 30   Total Evaluation Time 30     Teresita Gaviria MA, [facility]  [facility]  [phone] (Phone)  [phone] (Fax)

## 2022-01-27 NOTE — PROGRESS NOTES
[facility]      OUTPATIENT SPEECH LANGUAGE PATHOLOGY SWALLOW  EVALUATION  PLAN OF TREATMENT FOR OUTPATIENT REHABILITATION  (COMPLETE FOR INITIAL CLAIMS ONLY)  Patient's Last Name, First Name, M.I.  YOB: 1955  Rajni Lara       Provider s Name:      Medical Record No.  [facility]    [id number]    Onset Date: 1/10/2022    Start of Care Date: 1/20/2022        Type:     ___PT   __OT   _X_SLP   Medical Diagnosis: Myotonic Muscular Dystrophy        Therapy Diagnosis: Dysphagia    Visits from SOC: 1          _________________________________________________________________________________  Plan of Treatment/Functional Goals:     Goal Identifier Oropharyngeal Exercises   Goal Description OJ will complete oropharyngeal exercises (effortful swallow and tongue hold) with reduced frequency due to his medical diagnosis on a daily basis for 3 weeks to assess if a decrease can occur in the frequency of the sensation of tightness he experiences in his chest.   Target Date 02/20/22   Date Met      Progress (detail required for progress note):         Therapy Frequency:   1 follow up planned in 3 weeks.    Teresita House, SLP       I CERTIFY THE NEED FOR THESE SERVICES FURNISHED UNDER        THIS PLAN OF TREATMENT AND WHILE UNDER MY CARE     (Physician co-signature of this document indicates review and certification of the therapy plan).                Certification Date From:   1/20/2022  Certification Date To:  2/20/2022    Referring Provider:   Reinaldo Rodriguez MD    Initial Assessment        See Epic Evaluation

## 2022-02-10 ENCOUNTER — HOSPITAL ENCOUNTER (OUTPATIENT)
Dept: PHYSICAL THERAPY | Facility: CLINIC | Age: 67
Setting detail: THERAPIES SERIES
End: 2022-02-10
Payer: COMMERCIAL

## 2022-02-10 ENCOUNTER — HOSPITAL ENCOUNTER (OUTPATIENT)
Dept: SPEECH THERAPY | Facility: CLINIC | Age: 67
Setting detail: THERAPIES SERIES
End: 2022-02-10
Attending: PSYCHIATRY & NEUROLOGY
Payer: COMMERCIAL

## 2022-02-10 DIAGNOSIS — G71.11 MYOTONIC DYSTROPHY, TYPE 2 (H): ICD-10-CM

## 2022-02-10 PROCEDURE — 97161 PT EVAL LOW COMPLEX 20 MIN: CPT | Mod: GP | Performed by: PHYSICAL THERAPIST

## 2022-02-10 PROCEDURE — 97110 THERAPEUTIC EXERCISES: CPT | Mod: GP | Performed by: PHYSICAL THERAPIST

## 2022-02-10 PROCEDURE — 92526 ORAL FUNCTION THERAPY: CPT | Mod: GN | Performed by: SPEECH-LANGUAGE PATHOLOGIST

## 2022-02-10 NOTE — PROGRESS NOTES
Functional Gait Assessment (FGA): The FGA assesses postural stability during various walking tasks.   Gait assistive device used: None    20/30    Scores of <22 /30 have been correlated with predicting falls in community-dwelling older adults according to Bob & Diego 2010.   Scores of <18 /30 have been correlated with increased risk for falls in patients with Parkinsons Disease according to Amrik Hawkins Zhou et al 2014.  Minimal Detectable Change for patients with acute/chronic stroke = 4.2 according to Thikiki & Ritschel 2009  Minimal Detectable Change for patients with vestibular disorder = 8 according to Bob Cortez 2010    Assessment (rationale for performing, application to patient s function & care plan): Performed FGA in order to assess balance with gait. Patient scoring a risk of fall as indicated by score that was <23 on the test. He has difficulty with higher level tasks such as backwards walking and close eyes. He was able to do stairs with step to pattern instead of reciprocal.   (Minutes billed as physical performance test)       02/10/22 1500   Signing Clinician's Name / Credentials   Signing clinician's name / credentials Debby Rucker, PT, DPT    Functional Gait Assessment (KYRA Rojas., Daniel GLorin F., et al. (2004))   1. GAIT LEVEL SURFACE 3   2. CHANGE IN GAIT SPEED 3   3. GAIT WITH HORIZONTAL HEAD TURNS 2   4. GAIT WITH VERTICAL HEAD TURNS 2   5. GAIT AND PIVOT TURN 3   6. STEP OVER OBSTACLE 2   7. GAIT WITH NARROW BASE OF SUPPORT 2   8. GAIT WITH EYES CLOSED 1   9. AMBULATING BACKWARDS 1   10. STEPS 1   Total Functional Gait Assessment Score   TOTAL SCORE: (MAXIMUM SCORE 30) 20

## 2022-02-10 NOTE — PROGRESS NOTES
02/10/22 1400   Quick Adds   Quick Adds Certification   Type of Visit Initial OP PT Evaluation   General Information   Start of Care Date 02/10/22   Referring Physician Reinaldo Rodriguez MD    Orders Evaluate and Treat as Indicated   Order Date 01/10/22   Medical Diagnosis myotonic dystrophy    Onset of illness/injury or Date of Surgery 01/10/22   Surgical/Medical history reviewed Yes  (cataracts, atrial flutter w/ ablation, BPH )   Pertinent history of current problem (include personal factors and/or comorbidities that impact the POC) Recent dx of myotonic dystrophy type II but was noticing increased weakness over the last 3-4 years. Sister also has disease. He has more trouble with getting up out of chairs, getting up off the floor when playing with grandkids, supine to sit/bed mobility, stairs. He will shuffle his feet when walking and does not feel like he could walk heel to toe easily.    Prior level of function comment did PT for his back issues and doing back exercises; biking on exercise bike 30-40 minutes 3-4 days per week; watching grandkids 2 days per week; uses 5 lb weights for some exercises as well    Patient role/Employment history Retired   Living environment [place]   Home/Community Accessibility Comments stairs, 14 up and 14 to basement, using the rail for these doing 1 step at at time; Spouse lives at home with him    Assistive Devices Comments they have higher toilet seats and are looking for some chairs/surfaces that are higher to make getting up easier    Patient/Family Goals Statement improve his strength and get exercising, easier time with transfers    Fall Risk Screen   Fall screen completed by PT   Have you fallen 2 or more times in the past year? Yes   Have you fallen and had an injury in the past year? No   Timed Up and Go score (seconds) not tested    Is patient a fall risk? Yes   Fall screen comments does have falls, tripping over things and not catching himself-- see  FGA, at risk of falls as indicated by this test    Abuse Screen (yes response referral indicated)   Feels Unsafe at Home or Work/School no   Feels Threatened by Someone no   Does Anyone Try to Keep You From Having Contact with Others or Doing Things Outside Your Home? no   Physical Signs of Abuse Present no   Pain   Pain comments denies any pain today at start of session; later in session noticed the R side of low back had some pain but was able to work through it    Cognitive Status Examination   Orientation orientation to person, place and time   Level of Consciousness alert   Follows Commands and Answers Questions 100% of the time   Personal Safety and Judgment intact   Memory intact   Posture   Posture Comments forward shoulder posture and head position    Range of Motion (ROM)   ROM Comment grossly WFLs    Strength   Strength Comments 3+/5 L hip flexors, 4/5 R hip flexors; 4-/5 shoulder flexors; glute weakness as noted with decreased ROM for bridge; overall core weakness observed with watching bed mobility    Bed Mobility   Bed Mobility Comments needed Mod A from PT from supine<>sit today d/t core and hip weakness    Transfer Skills   Transfer Comments sit to stand with need for UEs on any chair <24 inches in height; from 24'' he can do without his UEs    Gait   Gait Comments ambulates with B arm swing and proper foot clearance for short distances    Gait Special Tests   Gait Special Tests 25 FOOT TIMED WALK;FUNCTIONAL GAIT ASSESSMENT   Gait Special Tests 25 Foot Timed Walk   Seconds 6.97   Steps 12 Steps   Gait Special Tests Functional Gait Assessment Score out of 30   Score out of 30 20   Comments see progress note    Balance Special Tests   Balance Special Tests Sit to stand reps;Single leg stance right;Single leg stance left   Balance Special Tests Single Leg Stance Right,   Right, seconds 2 Seconds   Balance Special Tests Single Leg Stance Left   Left, seconds 2 Seconds   Balance Special Tests Sit to Stand  Reps in 30 Seconds   Comments 23.25 5 sit to stand (24 inch chair)    Sensory Examination   Sensory Perception Comments denies any sensory changes    Planned Therapy Interventions   Planned Therapy Interventions IADL retraining;balance training;gait training;neuromuscular re-education;ROM;strengthening;stretching;transfer training   Clinical Impression   Criteria for Skilled Therapeutic Interventions Met yes, treatment indicated   PT Diagnosis proximal muscle weakness with impaired mobility    Influenced by the following impairments decreased strength, balance impairments, fatigue    Functional limitations due to impairments unable to easily mobilize at home and in the community    Clinical Presentation Stable/Uncomplicated   Clinical Presentation Rationale stable medically, PT impairments, clinical judgement    Clinical Decision Making (Complexity) Low complexity   Therapy Frequency other (see comments)  (every 3-4 weeks to progress HEP )   Predicted Duration of Therapy Intervention (days/wks) up to 90 days   Risk & Benefits of therapy have been explained Yes   Patient, Family & other staff in agreement with plan of care Yes   Clinical Impression Comments Patient presents with new dx of myotonic dystrophy but onset of weakness noted several years ago. Patient proximally week as to be exepected with this disease and will benefit from PT in order to work on progressing/maintaining strength and work on balance. Patient will be seen every 3 weeks to progress his HEP.    GOALS   PT Eval Goals 2;1;3;4   Goal 1   Goal Identifier sit to stand   Goal Description OJ will perform sit to stand from 22'' or < surface x 5 reps without use of UEs in order to show progression with his strength to improve ease of transfers.    Target Date 04/10/22   Goal 2   Goal Identifier FGA   Goal Description OJ will improve score on FGA from 20 to 25 or > in order to show improved balance to ambulate outside in the summer.     Target Date  04/10/22   Goal 3   Goal Identifier stairs   Goal Description OJ will perform 14 stairs x 2 reps with reciprocal pattern and rails to move easily access his home and show improvement in LE strength.    Target Date 04/10/22   Goal 4   Goal Identifier HEP   Goal Description OJ will peform HEP for strengthening and balance in order to improve his overall independence with mobility.    Target Date 05/10/22   Total Evaluation Time   PT Eval, Low Complexity Minutes (76590) 30   Therapy Certification   Certification date from 02/10/22   Certification date to 05/10/22   Medical Diagnosis myotonic dystrophy    Certification I certify the need for these services furnished under this plan of treatment and while under my care.  (Physician co-signature of this document indicates review and certification of the therapy plan).

## 2022-02-10 NOTE — PROGRESS NOTES
[facility]                                                                                   OUTPATIENT PHYSICAL THERAPY FUNCTIONAL EVALUATION  PLAN OF TREATMENT FOR OUTPATIENT REHABILITATION  (COMPLETE FOR INITIAL CLAIMS ONLY)  Patient's Last Name, First Name, M.I.  YOB: 1955  Rajni Lara        Provider's Name   [facility]   Medical Record No.  [id number]     Start of Care Date:  02/10/22   Onset Date:  01/10/22   Type:     _X__PT   ____OT  ____SLP Medical Diagnosis:  myotonic dystrophy      PT Diagnosis:  proximal muscle weakness with impaired mobility  Visits from SOC:  1                              __________________________________________________________________________________  Plan of Treatment/Functional Goals:  IADL retraining,balance training,gait training,neuromuscular re-education,ROM,strengthening,stretching,transfer training           GOALS  sit to stand  OJ will perform sit to stand from 22'' or < surface x 5 reps without use of UEs in order to show progression with his strength to improve ease of transfers.   04/10/22    FGA  OJ will improve score on FGA from 20 to 25 or > in order to show improved balance to ambulate outside in the summer.    04/10/22    stairs  OJ will perform 14 stairs x 2 reps with reciprocal pattern and rails to move easily access his home and show improvement in LE strength.   04/10/22    HEP  OJ will peform HEP for strengthening and balance in order to improve his overall independence with mobility.   05/10/22            Therapy Frequency:  other (see comments) (every 3-4 weeks to progress HEP )   Predicted Duration of Therapy Intervention:  up to 90 days    Debby Rucker, PT                                    I CERTIFY THE NEED FOR THESE SERVICES FURNISHED UNDER        THIS PLAN OF TREATMENT AND WHILE UNDER MY CARE      (Physician co-signature of this document indicates review and certification of the therapy plan).                Certification Date From:  02/10/22   Certification Date To:  05/10/22    Referring Provider:  Reinaldo Rodriguez MD     Initial Assessment  See Epic Evaluation- Start of Care Date: 02/10/22

## 2022-03-10 ENCOUNTER — HOSPITAL ENCOUNTER (OUTPATIENT)
Dept: PHYSICAL THERAPY | Facility: CLINIC | Age: 67
Setting detail: THERAPIES SERIES
Discharge: HOME | End: 2022-03-10
Attending: PSYCHIATRY & NEUROLOGY
Payer: COMMERCIAL

## 2022-03-10 PROCEDURE — 97110 THERAPEUTIC EXERCISES: CPT | Mod: GP | Performed by: PHYSICAL THERAPIST

## 2022-04-21 ENCOUNTER — HOSPITAL ENCOUNTER (OUTPATIENT)
Dept: PHYSICAL THERAPY | Facility: CLINIC | Age: 67
Setting detail: THERAPIES SERIES
Discharge: HOME | End: 2022-04-21
Attending: PSYCHIATRY & NEUROLOGY
Payer: COMMERCIAL

## 2022-04-21 PROCEDURE — 97110 THERAPEUTIC EXERCISES: CPT | Mod: GP | Performed by: PHYSICAL THERAPIST

## 2022-04-22 DIAGNOSIS — Z13.6 ENCOUNTER FOR LIPID SCREENING FOR CARDIOVASCULAR DISEASE: ICD-10-CM

## 2022-04-22 DIAGNOSIS — G71.11 MYOTONIC DYSTROPHY, TYPE 2 (H): Primary | ICD-10-CM

## 2022-04-22 DIAGNOSIS — I50.89 OTHER HEART FAILURE (H): ICD-10-CM

## 2022-04-22 DIAGNOSIS — I42.8 NONISCHEMIC CARDIOMYOPATHY (H): ICD-10-CM

## 2022-04-22 DIAGNOSIS — Z13.6 ENCOUNTER FOR SCREENING FOR CARDIOVASCULAR DISORDERS: ICD-10-CM

## 2022-04-22 DIAGNOSIS — Z13.220 ENCOUNTER FOR LIPID SCREENING FOR CARDIOVASCULAR DISEASE: ICD-10-CM

## 2022-04-23 NOTE — PROGRESS NOTES
[facility]    OUTPATIENT PHYSICAL THERAPY  PLAN OF TREATMENT FOR OUTPATIENT REHABILITATION AND PROGRESS NOTE           Patient's Last Name, First Name, Rajni Hammond    Date of Birth  1955   Provider's Name  [facility] Medical Record No.  [id number]    Onset Date  1/10/21 Start of Care Date  2/10/22   Type:     _X_PT   ___OT   ___SLP Medical Diagnosis  Myotonic dystrophy   PT Diagnosis  Proximal muscle weakness with impaired mobility  Plan of Treatment  Frequency/Duration: every 3-4 week to progress HEP  Certification date from 5/11/22 to 8/7/22     Goals:  Goal Identifier sit to stand   Goal Description OSTONE will perform sit to stand from 22'' or < surface x 5 reps without use of UEs in order to show progression with his strength to improve ease of transfers.    Target Date 04/10/22   Date Met      Progress (detail required for progress note): not met, doing consistent from 24'' surfaces     Goal Identifier FGA   Goal Description OSTONE will improve score on FGA from 20 to 25 or > in order to show improved balance to ambulate outside in the summer.     Target Date 04/10/22   Date Met      Progress (detail required for progress note): did not re test today     Goal Identifier stairs   Goal Description LESLIE will perform 14 stairs x 2 reps with reciprocal pattern and rails to move easily access his home and show improvement in LE strength.    Target Date 04/10/22   Date Met      Progress (detail required for progress note): he is working on doing this at home, reciprocal pattern, not consistent with this     Goal Identifier HEP   Goal Description LESLIE will peform HEP for strengthening and balance in order to improve his overall independence with mobility.    Target Date 05/10/22   Date Met      Progress (detail required for progress note): continues to need  "reinforcement on form with exercises and no \"over doing it\"     Beginning/End Dates of Progress Note Reporting Period:  2/20/22 to 4/21/22    Progress Toward Goals:   Patient seen for 3 PT sessions to build HEP to work on improving his transfers and stairs as well as work on form with exercising to decrease back pain. He is doing exercises at home but needs continue reinforcement to focus on quality over quantity and to not over fatigue his muscles. He has had increased back pain from decreased core engagement with exercises and daily living tasks. He is biking with medium resistance for 30 minutes a few days per week in order to build his CV endurance. Patient to follow up monthly with PT to work on HEP.    Client Self (Subjective) Report for Progress Note Reporting Period: He has been biking 3 times per week for 30-45 minutes. R side of back and hip has some pain, 2 weeks ago start. Bending over he will notice this. He is not sure if arm exercises and bike are creating issues.        Objective Measurements:   Objective Measure: 24'' surface sit to stand  Details: 5 reps before he was fatigued, could not do lower today and needed to push from his legs today            I CERTIFY THE NEED FOR THESE SERVICES FURNISHED UNDER        THIS PLAN OF TREATMENT AND WHILE UNDER MY CARE     (Physician co-signature of this document indicates review and certification of the therapy plan).                Referring Provider: Dr. Reinaldo Rucker, PT    "

## 2022-04-25 ENCOUNTER — ANCILLARY PROCEDURE (OUTPATIENT)
Dept: CARDIOLOGY | Facility: CLINIC | Age: 67
End: 2022-04-25
Attending: INTERNAL MEDICINE
Payer: COMMERCIAL

## 2022-04-25 ENCOUNTER — LAB (OUTPATIENT)
Dept: LAB | Facility: CLINIC | Age: 67
End: 2022-04-25
Payer: COMMERCIAL

## 2022-04-25 VITALS
WEIGHT: 218.9 LBS | HEART RATE: 53 BPM | DIASTOLIC BLOOD PRESSURE: 66 MMHG | SYSTOLIC BLOOD PRESSURE: 134 MMHG | OXYGEN SATURATION: 99 %

## 2022-04-25 DIAGNOSIS — Z13.220 ENCOUNTER FOR LIPID SCREENING FOR CARDIOVASCULAR DISEASE: ICD-10-CM

## 2022-04-25 DIAGNOSIS — Z13.6 ENCOUNTER FOR SCREENING FOR CARDIOVASCULAR DISORDERS: ICD-10-CM

## 2022-04-25 DIAGNOSIS — G71.11 MYOTONIC DYSTROPHY, TYPE 2 (H): ICD-10-CM

## 2022-04-25 DIAGNOSIS — I42.8 NONISCHEMIC CARDIOMYOPATHY (H): ICD-10-CM

## 2022-04-25 DIAGNOSIS — Z13.6 ENCOUNTER FOR LIPID SCREENING FOR CARDIOVASCULAR DISEASE: ICD-10-CM

## 2022-04-25 DIAGNOSIS — R55 PRE-SYNCOPE: ICD-10-CM

## 2022-04-25 DIAGNOSIS — Z13.6 ENCOUNTER FOR SCREENING FOR CARDIOVASCULAR DISORDERS: Primary | ICD-10-CM

## 2022-04-25 DIAGNOSIS — I50.89 OTHER HEART FAILURE (H): ICD-10-CM

## 2022-04-25 LAB
ALBUMIN SERPL-MCNC: 4.3 G/DL (ref 3.4–5)
ALP SERPL-CCNC: 93 U/L (ref 40–150)
ALT SERPL W P-5'-P-CCNC: 45 U/L (ref 0–70)
ANION GAP SERPL CALCULATED.3IONS-SCNC: 6 MMOL/L (ref 3–14)
AST SERPL W P-5'-P-CCNC: 44 U/L (ref 0–45)
BILIRUB SERPL-MCNC: 0.9 MG/DL (ref 0.2–1.3)
BUN SERPL-MCNC: 18 MG/DL (ref 7–30)
CALCIUM SERPL-MCNC: 9.8 MG/DL (ref 8.5–10.1)
CHLORIDE BLD-SCNC: 107 MMOL/L (ref 94–109)
CHOLEST SERPL-MCNC: 220 MG/DL
CK SERPL-CCNC: 625 U/L (ref 30–300)
CO2 SERPL-SCNC: 28 MMOL/L (ref 20–32)
CREAT SERPL-MCNC: 0.56 MG/DL (ref 0.66–1.25)
ERYTHROCYTE [DISTWIDTH] IN BLOOD BY AUTOMATED COUNT: 13.2 % (ref 10–15)
FASTING STATUS PATIENT QL REPORTED: YES
GFR SERPL CREATININE-BSD FRML MDRD: >90 ML/MIN/1.73M2
GLUCOSE BLD-MCNC: 98 MG/DL (ref 70–99)
HCT VFR BLD AUTO: 48 % (ref 40–53)
HDLC SERPL-MCNC: 63 MG/DL
HGB BLD-MCNC: 16.3 G/DL (ref 13.3–17.7)
LDLC SERPL CALC-MCNC: 116 MG/DL
MAGNESIUM SERPL-MCNC: 2.4 MG/DL (ref 1.6–2.3)
MCH RBC QN AUTO: 31.7 PG (ref 26.5–33)
MCHC RBC AUTO-ENTMCNC: 34 G/DL (ref 31.5–36.5)
MCV RBC AUTO: 93 FL (ref 78–100)
NONHDLC SERPL-MCNC: 157 MG/DL
NT-PROBNP SERPL-MCNC: 123 PG/ML (ref 0–125)
PLATELET # BLD AUTO: 208 10E3/UL (ref 150–450)
POTASSIUM BLD-SCNC: 4.8 MMOL/L (ref 3.4–5.3)
PROT SERPL-MCNC: 7.6 G/DL (ref 6.8–8.8)
RBC # BLD AUTO: 5.15 10E6/UL (ref 4.4–5.9)
SODIUM SERPL-SCNC: 141 MMOL/L (ref 133–144)
TRIGL SERPL-MCNC: 205 MG/DL
TROPONIN I SERPL HS-MCNC: 110 NG/L
WBC # BLD AUTO: 8.1 10E3/UL (ref 4–11)

## 2022-04-25 PROCEDURE — 93244 EXT ECG>48HR<7D REV&INTERPJ: CPT | Performed by: INTERNAL MEDICINE

## 2022-04-25 PROCEDURE — 36415 COLL VENOUS BLD VENIPUNCTURE: CPT | Performed by: PATHOLOGY

## 2022-04-25 PROCEDURE — G0463 HOSPITAL OUTPT CLINIC VISIT: HCPCS

## 2022-04-25 PROCEDURE — 99205 OFFICE O/P NEW HI 60 MIN: CPT | Mod: 25 | Performed by: INTERNAL MEDICINE

## 2022-04-25 PROCEDURE — 93005 ELECTROCARDIOGRAM TRACING: CPT | Mod: XU

## 2022-04-25 PROCEDURE — 93242 EXT ECG>48HR<7D RECORDING: CPT

## 2022-04-25 ASSESSMENT — PAIN SCALES - GENERAL: PAINLEVEL: NO PAIN (0)

## 2022-04-25 NOTE — LETTER
2022      RE: Rajni Lara  [address] [facility] [address]       Dear Colleague,    Thank you for the opportunity to participate in the care of your patient, Rajni Lara, at the [facility] [place] at [facility]. Please see a copy of my visit note below.    Neurocardiomyopathy Clinic Consultation    Name: Rajni Lara  : 1955  MRN: [id number]    2022    Dear Dr. Rodriguez and Dr. Santiago,     I had the pleasure of seeing Rajni Lara, a 66 year old man today 2022 in the [facility] Neurocardiomyopathy [facility]  for evaluation cardiovascular involvement in the setting of myotonic dystrophy type 2.     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 He also has a family history of myotonic dystrophy in his sister and also probably his mother and maternal grandfather. He saw Dr. Cano in [facility] clinic on 1/10/22. He had an echocardiogram in  and was incidentally idenitied to have atrial flutter which showed an LVEF of 40-45%. In  he had a successful cardioversion for atrial flutter. He had a echo in 2009 which showed an LVEF of 50-55% and normal RV function. He had a nuclear stress test in  which showed thinning at the inferior base and no ischemia. He has not had cardiovascular follow up since then.     Overall he is feeling well. He is able to bike for 35-40 min with a heart rate range of 110-120 without dyspnea or chest pain. He bikes 2-3 times a week, which he has reduced due to back pain. He is also participating in physical therapy in [place], which is going well. He estimates that he gained 15 lbs in 2 years. He does have lower extremity edema, for which he is wearing compression stockings. No orthopnea or PND. He is sleeping well and does not feel he needs to see sleep medicine at this time. He had a syncopal event  in  for which he went to the ED. Last week he had a presyncopal event while sitting down and eating. He had chest tightness when swallowing, which is similar to what occurred in . He has seen speech therapy for a swallow evaluation in January and has mild dysphagia. He has not had lightheadedness but has had episodes of racing heart. He using naproxen when he is sore, which he estimates is a few times a week. He also has concerns about falling. He does have a cane but is not regularly using it for ambulation.     No history of diabetes, hypertension, or dyslipidemia.     REVIEW OF SYSTEMS: 10 point ROS neg other than the symptoms noted above in the HPI.    PAST MEDICAL HISTORY:   1. Myotonic dystrophy type 1  2. History of atrial flutter s/p [facility]   3. History of non ischemic cardiomyopathy   4. BPH      ALLERGIES:    Allergies   Allergen Reactions     Sulfa Drugs Dermatitis, Headache and Photosensitivity       MEDICATIONS:   acetaminophen (TYLENOL) 500 MG tablet, Take 1,000 mg by mouth  aspirin (ASA) 81 MG chewable tablet, Take 81 mg by mouth daily  finasteride (PROSCAR) 5 MG tablet, Take 1 tablet by mouth every other day  nystatin-triamcinolone (MYCOLOG) 036585-4.1 UNIT/GM-% external ointment, Apply topically 2 times daily  tamsulosin (FLOMAX) 0.4 MG capsule, TAKE ONE CAPSULE BY MOUTH AT BEDTIME    No current facility-administered medications on file prior to visit.      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   Tobacco: never  Alcohol: rarely   Illicits: now  His family is from [facility]       FAMILY HISTORY:  His family is from [place] [facility]. He has multiple maternal family members with myotonic dystrophy.   Mom when 70 when falling and missing steps and in her 80s she was bedbound.   Sister is a physician and has MD2  Maternal grand father had a heart attack 59, and  and also had some leg weakness  Maternal uncle sudden death at age 64    PHYSICAL EXAM:   /66 (BP  Location: Left arm, Patient Position: Chair, Cuff Size: Adult Large)   Pulse 53   Wt 99.3 kg (218 lb 14.4 oz)   SpO2 99%   General: comfortable, conversant, NAD  HEENT: normocephalic, atraumatic, anicteric, normal oropharynx  Neck: Estimate JVP <7, normal carotid upstroke  CV: RRR, nl s1 and s2, no murmurs, gallops, or rubs   Lungs: CTAB, no crackles or wheezes, normal work of breathing  Abdomen: BS+, soft, non tender, non distended  Extremities: warm and well perfused, no edema, wearing compression stockings  Neuro: normal speech and gait,   Psych: normal affect, oriented  Skin: no visible lesions    DIAGNOSTIC TESTING:    Latest Reference Range & Units 04/25/22 14:13   Sodium 133 - 144 mmol/L 141   Potassium 3.4 - 5.3 mmol/L 4.8   Chloride 94 - 109 mmol/L 107   Carbon Dioxide 20 - 32 mmol/L 28   Urea Nitrogen 7 - 30 mg/dL 18   Creatinine 0.66 - 1.25 mg/dL 0.56 (L)   GFR Estimate >60 mL/min/1.73m2 >90 [1]   Calcium 8.5 - 10.1 mg/dL 9.8   Anion Gap 3 - 14 mmol/L 6   Magnesium 1.6 - 2.3 mg/dL 2.4 (H)   Albumin 3.4 - 5.0 g/dL 4.3   Protein Total 6.8 - 8.8 g/dL 7.6   Bilirubin Total 0.2 - 1.3 mg/dL 0.9   Alkaline Phosphatase 40 - 150 U/L 93   ALT 0 - 70 U/L 45   AST 0 - 45 U/L 44   Cholesterol <200 mg/dL 220 (H)   CK Total 30 - 300 U/L 625 (H)   Patient Fasting > 8hrs?  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 He has a history of atrial flutter and a mildly reduced LVEF, but he has not had recent cardiovascular follow up. He has had a syncopal event over a year ago associated with dysphagia and most recently had a similar presyncopal event last week.      1. 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 Presyncope  3. History of atrial flutter  4. History of non-ischemic cardiomyopathy with subsequently recovered LVEF    Overall, he is well compensated and euvolemic. His blood pressure is mildly elevated and was similarly elevated a his visit with Dr. Rodriguez. He has normal end organ function. His BNP is normal and his troponin is mildly elevated, which can be elevated in patients with myotonic dystrophy.  Given his myotonic dystrophy, history of atrial flutter,  and recent presyncope, I would like to obtain an ECG and ziopatch monitor today. Additionally, he should have a cardiac MRI with stress and contrast to assess biventricular function, fibrosis, and perfusion in the setting of myotonic dystrophy and prior mild LV dysfunction.     PLAN:   -ECG  -ziopatch monitor  -CMR with contrast and stress  -virtual visit for follow up of results  -annual visit which can be coordinated with Dr. Rodriguez' visit     75 minutes on DOS for chart review, patient history and exam, counseling, review of diagnostic testing, coordination of care and documentation.     Thank you for allowing me to participate in the care of your patient. Please do not hesitate to contact me if you have any questions.     Sincerely,   Forum     Mark Ashton MD, PhD, [facility]  Advanced Heart Failure/Transplantation/MCS  [facility]/[employer]

## 2022-04-25 NOTE — NURSING NOTE
Chief Complaint   Patient presents with     New Patient     New muscular dystrophy     Vitals were taken and medications reconciled.    Dionte Sawyer, EMT  2:23 PM

## 2022-04-25 NOTE — NURSING NOTE
-You will have an EKG and 7 day heart monitor placed in clinic today. It will be at least a month until you get the results from this monitor. When we receive the results we will call you.   -Schedule Cardiac MRI. For any changes in appointment please call [phone].   -Follow-up with Dr. Ashton in 1 year with labs prior. We will reach out to you to schedule this appt. We will do our best to have your appt with Dr. Ashton and Dr. Rodriguez on the same day.     -Release of information sent to [facility].

## 2022-04-25 NOTE — PATIENT INSTRUCTIONS
"You were seen today in the Cardiovascular Clinic at the [facility].      Cardiology Providers you saw during your visit:  Dr. Mark Ashton     Recommendations:   -You will have an EKG and 7 day heart monitor placed in clinic today. It will be at least a month until you get the results from this monitor. When we receive the results we will call you.   -Schedule Cardiac MRI. For any changes in appointment please call [phone].   -Follow-up with Dr. Ashton in 1 year with labs prior. We will reach out to you to schedule this appt. We will do our best to have your appt with Dr. Ashton and Dr. Rodriguez on the same day.                Thank you for your visit today!   Please MyChart message or call if you have any questions or concerns.      During Business Hours:  [phone], option # 1 \"To leave a message for your care team\"     After hours, weekends or holidays:   [phone], Option #4  Ask to speak to the On-Call Cardiologist. Inform them you are a heart failure patient at the [facility].      Joan Tolbert RN BSN   Cardiology Nurse Coordinator - Heart Failure/C.O.R.E. Clinic  [facility]  [phone] option 1 to schedule an appointment or leave a message for your care team        Cardiac MRI    Magnetic resonance imaging (MRI) is a test that lets your doctor see detailed pictures of the inside of your body. MRI combines the use of strong magnets and radio waves to form an MRI image. A Cardiac MRI will give a detailed image of your heart.    Follow these instructions:    Please arrive to the Gold Waiting Room in the [facility].   1. You will be required to lay flat and follow breath-hold instructions.   2. You will need to remove all metal and answer a safety questionnaire. Please wear clothes without metal (snaps and zippers). Please remove any body piercings and hair extensions before you arrive. You will also remove watches, jewelry, hairpins, wallets, dentures, " partial dental plates and hearing aids. You may wear contact lenses, and you may be able to wear your rings. We have a safe place to keep your personal items, but it is safer to leave them at home.  3. You will be given IV contrast for this exam.  To prepare:  The day before the exam drink extra fluid - at least six 8oz glasses (unless you are on a fluid restriction per your doctor)     Please inform us if you would answer YES to any of the following questions:  Are you claustrophobic?   Do you have metals in your body?  Do you any contrast allergies?  Diabetes or Kidney disease?

## 2022-04-25 NOTE — PROGRESS NOTES
Neurocardiomyopathy Clinic Consultation    Name: Rajni Lara  : 1955  MRN: [id number]    2022    Dear Dr. Rodriguez and Dr. Santiago,     I had the pleasure of seeing Rajni Lara, a 66 year old man today 2022 in the [facility] Neurocardiomyopathy Clinic  for evaluation cardiovascular involvement in the setting of myotonic dystrophy type 2.     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 He also has a family history of myotonic dystrophy in his sister and also probably his mother and maternal grandfather. He saw Dr. Cano in [facility] clinic on 1/10/22. He had an echocardiogram in  and was incidentally idenitied to have atrial flutter which showed an LVEF of 40-45%. In  he had a successful cardioversion for atrial flutter. He had a echo in 2009 which showed an LVEF of 50-55% and normal RV function. He had a nuclear stress test in  which showed thinning at the inferior base and no ischemia. He has not had cardiovascular follow up since then.     Overall he is feeling well. He is able to bike for 35-40 min with a heart rate range of 110-120 without dyspnea or chest pain. He bikes 2-3 times a week, which he has reduced due to back pain. He is also participating in physical therapy in [place], which is going well. He estimates that he gained 15 lbs in 2 years. He does have lower extremity edema, for which he is wearing compression stockings. No orthopnea or PND. He is sleeping well and does not feel he needs to see sleep medicine at this time. He had a syncopal event in  for which he went to the ED. Last week he had a presyncopal event while sitting down and eating. He had chest tightness when swallowing, which is similar to what occurred in . He has seen speech therapy for a swallow evaluation in January and has mild dysphagia. He has not had lightheadedness but has had episodes of  racing heart. He using naproxen when he is sore, which he estimates is a few times a week. He also has concerns about falling. He does have a cane but is not regularly using it for ambulation.     No history of diabetes, hypertension, or dyslipidemia.     REVIEW OF SYSTEMS: 10 point ROS neg other than the symptoms noted above in the HPI.    PAST MEDICAL HISTORY:   1. Myotonic dystrophy type 1  2. History of atrial flutter s/p [facility]   3. History of non ischemic cardiomyopathy   4. BPH      ALLERGIES:    Allergies   Allergen Reactions     Sulfa Drugs Dermatitis, Headache and Photosensitivity       MEDICATIONS:   acetaminophen (TYLENOL) 500 MG tablet, Take 1,000 mg by mouth  aspirin (ASA) 81 MG chewable tablet, Take 81 mg by mouth daily  finasteride (PROSCAR) 5 MG tablet, Take 1 tablet by mouth every other day  nystatin-triamcinolone (MYCOLOG) 035114-4.1 UNIT/GM-% external ointment, Apply topically 2 times daily  tamsulosin (FLOMAX) 0.4 MG capsule, TAKE ONE CAPSULE BY MOUTH AT BEDTIME    No current facility-administered medications on file prior to visit.      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   Tobacco: never  Alcohol: rarely   Illicits: now  His family is from [facility]       FAMILY HISTORY:  His family is from [facility]. He has multiple maternal family members with myotonic dystrophy.   Mom when 70 when falling and missing steps and in her 80s she was bedbound.   Sister is a physician and has MD2  Maternal grand father had a heart attack 59, and  and also had some leg weakness  Maternal uncle sudden death at age 64    PHYSICAL EXAM:   /66 (BP Location: Left arm, Patient Position: Chair, Cuff Size: Adult Large)   Pulse 53   Wt 99.3 kg (218 lb 14.4 oz)   SpO2 99%   General: comfortable, conversant, NAD  HEENT: normocephalic, atraumatic, anicteric, normal oropharynx  Neck: Estimate JVP <7, normal carotid upstroke  CV: RRR, nl s1 and s2, no murmurs, gallops, or rubs    Lungs: CTAB, no crackles or wheezes, normal work of breathing  Abdomen: BS+, soft, non tender, non distended  Extremities: warm and well perfused, no edema, wearing compression stockings  Neuro: normal speech and gait,   Psych: normal affect, oriented  Skin: no visible lesions    DIAGNOSTIC TESTING:    Latest Reference Range & Units 04/25/22 14:13   Sodium 133 - 144 mmol/L 141   Potassium 3.4 - 5.3 mmol/L 4.8   Chloride 94 - 109 mmol/L 107   Carbon Dioxide 20 - 32 mmol/L 28   Urea Nitrogen 7 - 30 mg/dL 18   Creatinine 0.66 - 1.25 mg/dL 0.56 (L)   GFR Estimate >60 mL/min/1.73m2 >90 [1]   Calcium 8.5 - 10.1 mg/dL 9.8   Anion Gap 3 - 14 mmol/L 6   Magnesium 1.6 - 2.3 mg/dL 2.4 (H)   Albumin 3.4 - 5.0 g/dL 4.3   Protein Total 6.8 - 8.8 g/dL 7.6   Bilirubin Total 0.2 - 1.3 mg/dL 0.9   Alkaline Phosphatase 40 - 150 U/L 93   ALT 0 - 70 U/L 45   AST 0 - 45 U/L 44   Cholesterol <200 mg/dL 220 (H)   CK Total 30 - 300 U/L 625 (H)   Patient Fasting > 8hrs?  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 He has a history of atrial flutter and a mildly reduced LVEF, but he has not had recent cardiovascular follow up. He has had a syncopal event over a year ago associated with dysphagia and most recently had a similar presyncopal event last week.      1. Myotonic dystrophy type 2, genetically confirmed with >75  CCTG repeats  2. Presyncope  3. History of atrial flutter  4. History of non-ischemic cardiomyopathy with subsequently recovered LVEF    Overall, he is well compensated and euvolemic. His blood pressure is mildly elevated and was similarly elevated a his visit with Dr. Rodriguez. He has normal end organ function. His BNP is normal and his troponin is mildly elevated, which can be elevated in patients with myotonic dystrophy.  Given his myotonic dystrophy, history of atrial flutter,  and recent presyncope, I would like to obtain an ECG and ziopatch monitor today. Additionally, he should have a cardiac MRI with stress and contrast to assess biventricular function, fibrosis, and perfusion in the setting of myotonic dystrophy and prior mild LV dysfunction.     PLAN:   -ECG  -ziopatch monitor  -CMR with contrast and stress  -virtual visit for follow up of results  -annual visit which can be coordinated with Dr. Rodriguez' visit     75 minutes on DOS for chart review, patient history and exam, counseling, review of diagnostic testing, coordination of care and documentation.     Thank you for allowing me to participate in the care of your patient. Please do not hesitate to contact me if you have any questions.     Sincerely,   Forum     Forum MD Daisha, PhD, FACC  Advanced Heart Failure/Transplantation/MCS  [facility]/[employer]

## 2022-04-25 NOTE — NURSING NOTE
You will have an EKG and 7 day heart monitor placed in clinic today. It will be at least a month until you get the results from this monitor. When we receive the results we will call you.   -Schedule Cardiac MRI.   -Follow-up with Dr. Ashton in 1 year with labs prior. We will reach out to you to schedule this appt. We will do our best to have your appt with Dr. Ashton and Dr. Rodriguez on the same day.   -Release of record form filled out with pt and faxed; Follow-up for Echo from outside facility.

## 2022-04-25 NOTE — PROGRESS NOTES
Per Dr. Ashton, patient to have 7-day Zio monitor placed.  Diagnosis: myotonic dystrophy, type 2  Monitor placed: Yes  Patient Instructed: Yes  Patient verbalized understanding: Yes  Holter # [id number]    Monitor was placed by JANET Chung.  3:32 PM

## 2022-04-27 ENCOUNTER — MYC MEDICAL ADVICE (OUTPATIENT)
Dept: CARDIOLOGY | Facility: CLINIC | Age: 67
End: 2022-04-27
Payer: COMMERCIAL

## 2022-04-27 LAB
ATRIAL RATE - MUSE: 50 BPM
DIASTOLIC BLOOD PRESSURE - MUSE: NORMAL MMHG
INTERPRETATION ECG - MUSE: NORMAL
P AXIS - MUSE: 72 DEGREES
PR INTERVAL - MUSE: 206 MS
QRS DURATION - MUSE: 144 MS
QT - MUSE: 470 MS
QTC - MUSE: 428 MS
R AXIS - MUSE: -40 DEGREES
SYSTOLIC BLOOD PRESSURE - MUSE: NORMAL MMHG
T AXIS - MUSE: -39 DEGREES
VENTRICULAR RATE- MUSE: 50 BPM

## 2022-04-29 ENCOUNTER — TELEPHONE (OUTPATIENT)
Dept: CARDIOLOGY | Facility: CLINIC | Age: 67
End: 2022-04-29
Payer: COMMERCIAL

## 2022-04-29 ENCOUNTER — TELEPHONE (OUTPATIENT)
Dept: NEUROLOGY | Facility: CLINIC | Age: 67
End: 2022-04-29
Payer: COMMERCIAL

## 2022-04-29 NOTE — TELEPHONE ENCOUNTER
I called OJ back to discuss. He will get the echo done on Monday, 5/2 in [place].    Joan Tolbert RN

## 2022-04-29 NOTE — TELEPHONE ENCOUNTER
I called OJ with Dr. Ashton's recommendations. Order placed for echocardiogram to be done next week. I left a message with the number for scheduling as well as my call back number for any questions.    Joan Tolbert RN

## 2022-04-29 NOTE — TELEPHONE ENCOUNTER
[facility] Call Center    Phone Message    May a detailed message be left on voicemail: yes     Reason for Call: Other: Pt would like a call back today as he has an echo on Monday and would like to discuss     Action Taken: Message routed to:  [facility] Cardio    Travel Screening: Not Applicable

## 2022-04-29 NOTE — TELEPHONE ENCOUNTER
Called Rajni to confirm permission to send test results to daughter Virgil via email who is pursuing genetic testing. Rajni confirmed his permission for me to do so.    Kamila Silva MS [facility]  Genetic Counselor  Email: [email or web address]  Phone: [phone]  Pager: [phone]

## 2022-05-02 ENCOUNTER — ANCILLARY PROCEDURE (OUTPATIENT)
Dept: CARDIOLOGY | Facility: CLINIC | Age: 67
End: 2022-05-02
Attending: INTERNAL MEDICINE
Payer: COMMERCIAL

## 2022-05-02 DIAGNOSIS — I50.89 OTHER HEART FAILURE (H): ICD-10-CM

## 2022-05-02 DIAGNOSIS — Z13.6 ENCOUNTER FOR SCREENING FOR CARDIOVASCULAR DISORDERS: ICD-10-CM

## 2022-05-02 DIAGNOSIS — G71.11 MYOTONIC DYSTROPHY, TYPE 2 (H): ICD-10-CM

## 2022-05-02 LAB — LVEF ECHO: NORMAL

## 2022-05-02 PROCEDURE — 93306 TTE W/DOPPLER COMPLETE: CPT | Performed by: STUDENT IN AN ORGANIZED HEALTH CARE EDUCATION/TRAINING PROGRAM

## 2022-05-11 ENCOUNTER — TELEPHONE (OUTPATIENT)
Dept: CARDIOLOGY | Facility: CLINIC | Age: 67
End: 2022-05-11
Payer: COMMERCIAL

## 2022-05-11 NOTE — TELEPHONE ENCOUNTER
I called OJ to let him know that Dr. Ashton's schedule is only out through 12/31/22. We will have to wait until her schedule is out for next year to schedule an appointment.    Joan Tolbert RN

## 2022-05-11 NOTE — TELEPHONE ENCOUNTER
[facility] Call Center    Phone Message    May a detailed message be left on voicemail: yes     Reason for Call: Appointment Intake    Referring Provider Name: Daisha  Diagnosis and/or Symptoms: Pt was told by Dr. Ashton that she would see him on 1.16.23 when he has his appt w/Neurology.  Dr. Ashton's calendar is not available past 12.31.22.  Please scheduled pt on Dr. Ashton's calendar for 1.16.23 anytime after 10:30 am.  Pt does drive quite a distance for his appts.    Action Taken: Message routed to:  [facility] cardio    Travel Screening: Not Applicable

## 2022-07-18 ENCOUNTER — HOSPITAL ENCOUNTER (OUTPATIENT)
Dept: MRI IMAGING | Facility: CLINIC | Age: 67
Discharge: HOME | End: 2022-07-18
Attending: INTERNAL MEDICINE | Admitting: INTERNAL MEDICINE
Payer: COMMERCIAL

## 2022-07-18 VITALS — SYSTOLIC BLOOD PRESSURE: 143 MMHG | HEART RATE: 60 BPM | DIASTOLIC BLOOD PRESSURE: 76 MMHG

## 2022-07-18 DIAGNOSIS — I50.89 OTHER HEART FAILURE (H): ICD-10-CM

## 2022-07-18 DIAGNOSIS — Z13.6 ENCOUNTER FOR SCREENING FOR CARDIOVASCULAR DISORDERS: ICD-10-CM

## 2022-07-18 DIAGNOSIS — G71.11 MYOTONIC DYSTROPHY, TYPE 2 (H): ICD-10-CM

## 2022-07-18 PROCEDURE — 93005 ELECTROCARDIOGRAM TRACING: CPT

## 2022-07-18 PROCEDURE — 75563 CARD MRI W/STRESS IMG & DYE: CPT | Mod: 26 | Performed by: INTERNAL MEDICINE

## 2022-07-18 PROCEDURE — 93010 ELECTROCARDIOGRAM REPORT: CPT | Mod: 76 | Performed by: INTERNAL MEDICINE

## 2022-07-18 PROCEDURE — 93018 CV STRESS TEST I&R ONLY: CPT | Performed by: INTERNAL MEDICINE

## 2022-07-18 PROCEDURE — 75563 CARD MRI W/STRESS IMG & DYE: CPT

## 2022-07-18 PROCEDURE — 93016 CV STRESS TEST SUPVJ ONLY: CPT | Performed by: INTERNAL MEDICINE

## 2022-07-18 PROCEDURE — 255N000002 HC RX 255 OP 636: Performed by: INTERNAL MEDICINE

## 2022-07-18 PROCEDURE — 250N000011 HC RX IP 250 OP 636: Performed by: INTERNAL MEDICINE

## 2022-07-18 PROCEDURE — A9585 GADOBUTROL INJECTION: HCPCS | Performed by: INTERNAL MEDICINE

## 2022-07-18 RX ORDER — REGADENOSON 0.08 MG/ML
0.4 INJECTION, SOLUTION INTRAVENOUS ONCE
Status: COMPLETED | OUTPATIENT
Start: 2022-07-18 | End: 2022-07-18

## 2022-07-18 RX ORDER — GADOBUTROL 604.72 MG/ML
13 INJECTION INTRAVENOUS ONCE
Status: COMPLETED | OUTPATIENT
Start: 2022-07-18 | End: 2022-07-18

## 2022-07-18 RX ORDER — DIPHENHYDRAMINE HCL 25 MG
25 CAPSULE ORAL
Status: DISCONTINUED | OUTPATIENT
Start: 2022-07-18 | End: 2022-07-19 | Stop reason: HOSPADM

## 2022-07-18 RX ORDER — ONDANSETRON 2 MG/ML
4 INJECTION INTRAMUSCULAR; INTRAVENOUS
Status: DISCONTINUED | OUTPATIENT
Start: 2022-07-18 | End: 2022-07-19 | Stop reason: HOSPADM

## 2022-07-18 RX ORDER — ACYCLOVIR 200 MG/1
0-1 CAPSULE ORAL
Status: DISCONTINUED | OUTPATIENT
Start: 2022-07-18 | End: 2022-07-19 | Stop reason: HOSPADM

## 2022-07-18 RX ORDER — ALBUTEROL SULFATE 90 UG/1
2 AEROSOL, METERED RESPIRATORY (INHALATION) EVERY 5 MIN PRN
Status: DISCONTINUED | OUTPATIENT
Start: 2022-07-18 | End: 2022-07-19 | Stop reason: HOSPADM

## 2022-07-18 RX ORDER — AMINOPHYLLINE 25 MG/ML
100 INJECTION, SOLUTION INTRAVENOUS ONCE
Status: COMPLETED | OUTPATIENT
Start: 2022-07-18 | End: 2022-07-18

## 2022-07-18 RX ORDER — METHYLPREDNISOLONE SODIUM SUCCINATE 125 MG/2ML
125 INJECTION, POWDER, LYOPHILIZED, FOR SOLUTION INTRAMUSCULAR; INTRAVENOUS
Status: DISCONTINUED | OUTPATIENT
Start: 2022-07-18 | End: 2022-07-19 | Stop reason: HOSPADM

## 2022-07-18 RX ORDER — DIAZEPAM 5 MG
5 TABLET ORAL EVERY 30 MIN PRN
Status: DISCONTINUED | OUTPATIENT
Start: 2022-07-18 | End: 2022-07-19 | Stop reason: HOSPADM

## 2022-07-18 RX ORDER — DIPHENHYDRAMINE HYDROCHLORIDE 50 MG/ML
25-50 INJECTION INTRAMUSCULAR; INTRAVENOUS
Status: DISCONTINUED | OUTPATIENT
Start: 2022-07-18 | End: 2022-07-19 | Stop reason: HOSPADM

## 2022-07-18 RX ORDER — CAFFEINE CITRATE 20 MG/ML
60 SOLUTION INTRAVENOUS
Status: DISCONTINUED | OUTPATIENT
Start: 2022-07-18 | End: 2022-07-19 | Stop reason: HOSPADM

## 2022-07-18 RX ADMIN — AMINOPHYLLINE 100 MG: 25 INJECTION, SOLUTION INTRAVENOUS at 14:34

## 2022-07-18 RX ADMIN — REGADENOSON 0.4 MG: 0.08 INJECTION, SOLUTION INTRAVENOUS at 14:28

## 2022-07-18 RX ADMIN — GADOBUTROL 13 ML: 604.72 INJECTION INTRAVENOUS at 14:37

## 2022-07-19 LAB
ATRIAL RATE - MUSE: 55 BPM
ATRIAL RATE - MUSE: 62 BPM
DIASTOLIC BLOOD PRESSURE - MUSE: NORMAL MMHG
DIASTOLIC BLOOD PRESSURE - MUSE: NORMAL MMHG
INTERPRETATION ECG - MUSE: NORMAL
INTERPRETATION ECG - MUSE: NORMAL
P AXIS - MUSE: 80 DEGREES
P AXIS - MUSE: 82 DEGREES
PR INTERVAL - MUSE: 200 MS
PR INTERVAL - MUSE: 206 MS
QRS DURATION - MUSE: 138 MS
QRS DURATION - MUSE: 146 MS
QT - MUSE: 506 MS
QT - MUSE: 520 MS
QTC - MUSE: 497 MS
QTC - MUSE: 513 MS
R AXIS - MUSE: -52 DEGREES
R AXIS - MUSE: -57 DEGREES
SYSTOLIC BLOOD PRESSURE - MUSE: NORMAL MMHG
SYSTOLIC BLOOD PRESSURE - MUSE: NORMAL MMHG
T AXIS - MUSE: 80 DEGREES
T AXIS - MUSE: 94 DEGREES
VENTRICULAR RATE- MUSE: 55 BPM
VENTRICULAR RATE- MUSE: 62 BPM

## 2022-08-08 ENCOUNTER — VIRTUAL VISIT (OUTPATIENT)
Dept: CARDIOLOGY | Facility: CLINIC | Age: 67
End: 2022-08-08
Attending: INTERNAL MEDICINE
Payer: COMMERCIAL

## 2022-08-08 DIAGNOSIS — G71.11 MYOTONIC DYSTROPHY, TYPE 2 (H): Primary | ICD-10-CM

## 2022-08-08 DIAGNOSIS — Z13.6 ENCOUNTER FOR SCREENING FOR CARDIOVASCULAR DISORDERS: ICD-10-CM

## 2022-08-08 PROCEDURE — G0463 HOSPITAL OUTPT CLINIC VISIT: HCPCS | Mod: PN,RTG | Performed by: INTERNAL MEDICINE

## 2022-08-08 PROCEDURE — 99215 OFFICE O/P EST HI 40 MIN: CPT | Mod: 95 | Performed by: INTERNAL MEDICINE

## 2022-08-08 RX ORDER — LOSARTAN POTASSIUM 25 MG/1
12.5 TABLET ORAL DAILY
Qty: 45 TABLET | Refills: 3 | Status: SHIPPED | OUTPATIENT
Start: 2022-08-08 | End: 2023-07-31

## 2022-08-08 NOTE — NURSING NOTE
Labs: Patient was given results of the laboratory testing obtained today. Patient was instructed to return for the next laboratory testing in 1 month. Patient demonstrated understanding of this information and agreed to call with further questions or concerns.     Med Reconcile: Reviewed and verified all current medications with the patient. The updated medication list was printed and given to the patient.    New Medication: Patient was educated regarding newly prescribed medication, including discussion of  the indication, administration, side effects, and when to report to MD or RN. Patient demonstrated understanding of this information and agreed to call with further questions or concerns. Start taking losartan 12.5 mg daily. If tolerating in 2 weeks, we will start spironolactone 12.5 mg daily.    Return Appointment: Patient given instructions regarding scheduling next clinic visit. Patient demonstrated understanding of this information and agreed to call with further questions or concerns. Follow up with Dr. Ashton in January - coordinate with neuromuscular neurology appointment. Referral to EP - Dr. Gallagher.    Patient stated he understood all health information given and agreed to call with further questions or concerns.    Joan Tolbert RN

## 2022-08-08 NOTE — PROGRESS NOTES
LESLIE is a 66 year old who is being evaluated via a billable video visit.      How would you like to obtain your AVS? MyChart  If the video visit is dropped, the invitation should be resent by: Text to cell phone: [phone]   Will anyone else be joining your video visit?   pts wife  Taylor Asencio, VF/CMA          Video-Visit Details    Video Start Time: 1:04p    Type of service:  Video Visit    Video End Time:1:28    Originating Location (pt. Location): Home    Distant Location (provider location):  [facility] HEART [facility]     Platform used for Video Visit: [facility]    Neurocardiomyopathy Clinic Progress Note    Name: Rajni Lara  : 1955  MRN: [id number]    2022    Dear Dr. Rodriguez and Dr. Santiago,     I had the pleasure of conducting a virtual video visit with Rajni Lara, a 66 year old man today in the [facility] Neurocardiomyopathy Clinic for a follow up of myotonic dystrophy type 2 associated cardiomyopathy.     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 He also has a family history of myotonic dystrophy in his sister and also probably his mother and maternal grandfather. He saw Dr. Cano in [facility] clinic on 1/10/22. He had an echocardiogram in  and was incidentally idenitied to have atrial flutter which showed an LVEF of 40-45%. In  he had a successful cardioversion for atrial flutter. He had a echo in 2009 which showed an LVEF of 50-55% and normal RV function. He had a nuclear stress test in  which showed thinning at the inferior base and no ischemia. He has not had cardiovascular follow up since then.     I last saw him in clinic on 2022. He had a cardiac MRI which showed an LVEF of 46%, RVEF 47%, JEFFERSON, and mid myocardial enhancement in the septum, and no ischemia. His ziopatch monitor demonstrated sinus with 2.3% PVCs, no VT, and brief AV Wenckebach. Overall, he  continues to feel well. He was previously on ramipril 10mg daily when he was working due to elevated blood pressure. When he retired, he feels his stress levels improved and consequently blood pressure. He was able to stop oral antihypertensives then. His exercise tolerance remains the same, though he has been having left heel pain and has reduced his walking.     REVIEW OF SYSTEMS: 10 point ROS neg other than the symptoms noted above in the HPI.    PAST MEDICAL HISTORY:   1. Myotonic dystrophy type 2  2. History of atrial flutter s/p [facility]   3. History of non ischemic cardiomyopathy   4. BPH      ALLERGIES:    Allergies   Allergen Reactions     Sulfa Drugs Dermatitis, Headache and Photosensitivity       MEDICATIONS:   acetaminophen (TYLENOL) 500 MG tablet, Take 1,000 mg by mouth  aspirin (ASA) 81 MG chewable tablet, Take 81 mg by mouth daily  finasteride (PROSCAR) 5 MG tablet, Take 1 tablet by mouth every other day  nystatin-triamcinolone (MYCOLOG) 478318-4.1 UNIT/GM-% external ointment, Apply topically 2 times daily  tamsulosin (FLOMAX) 0.4 MG capsule, TAKE ONE CAPSULE BY MOUTH AT BEDTIME    No current facility-administered medications on file prior to visit.      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   Tobacco: never  Alcohol: rarely   Illicits: now  His family is from [place]       FAMILY HISTORY:  His family is from [place]. He has multiple maternal family members with myotonic dystrophy.   Mom when 70 when falling and missing steps and in her 80s she was bedbound.   Sister is a physician and has MD2  Maternal grand father had a heart attack 59, and  and also had some leg weakness  Maternal uncle sudden death at age 64    PHYSICAL EXAM:   GENERAL: Healthy, alert and no distress  EYES: Eyes grossly normal to inspection.  No discharge or erythema, or obvious scleral/conjunctival abnormalities.  RESP: No audible wheeze, cough, or visible cyanosis.  No visible retractions  or increased work of breathing.    SKIN: Visible skin clear. No significant rash, abnormal pigmentation or lesions.  NEURO: Cranial nerves grossly intact.  Mentation and speech appropriate for age.  PSYCH: Mentation appears normal, affect normal/bright, judgement and insight intact, normal speech and appearance well-groomed.      DIAGNOSTIC TESTING:   CMR 7/18/22: personally reviewed   Clinical history: 66 M myotonic dystrophy type 2, LVEF 45-50% on echo, 2.3% PVC burden on monitor  Comparison CMR: none     1. The LV is normal in cavity size and wall thickness. The global systolic function is mildly reduced. The  LVEF is 46%. There are no regional wall motion abnormalities.     2. The RV is normal in cavity size. The global systolic function is mildly reduced. The RVEF is 47%.      3. Both atria are severely enlarged.     4. There is no significant valvular disease.      5. Late gadolinium enhancement imaging shows mid-myocardial enhancement in the septum. This is often seen  in non-ischemic DCM, and is not typical for dystrophin-related CM (typically involves posterior wall).  There is no myocardial edema.      6. Regadenoson stress perfusion imaging shows no ischemia.     7. There is no pericardial effusion or thickening.     8. There is no intracardiac thrombus.     CONCLUSIONS: Mild NICM, LVEF 46% and RVEF 47%. No myocardial ischemia, with mild non-ischemic fibrosis in  the septum.     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 He has a history of atrial flutter and a mildly reduced LVEF .      1. 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 Mild non ischemic, biventricular systolic heart failure  3. Presyncope  3. History of atrial flutter  4.  Bifascicular block   5. Wenckebach   6. Hypertension history     His CMR shows mild non ischemic biventricular cardiomyopathy, which may be related to myotonic dystrophy type 2, as the scar pattern is variable in myotonic dystrophy type 2. I would like him to start losartan 12.5mg daily. If he tolerates this after 2 weeks I would like him to start spironolactone 12.5mg daily. The combination of ARB and MRA may be beneficial in muscular dystrophy patients to slow the progression of scar tissue.     Given his myotonic dystrophy type 2, recent presyncope, and now bifasicular block, I would like him to see Dr. Jenaro Hadley     PLAN:   -losartan 12.5mg daily   -if losartan tolerated, add spironolactone    -EP consultation   -follow up with me in Jan or Feb 2023    40 minutes on DOS for chart review, visit,  counseling, review of diagnostic testing, coordination of care and documentation.     Thank you for allowing me to participate in the care of your patient. Please do not hesitate to contact me if you have any questions.     Sincerely,   Forum     Forum MD Daisha, PhD, FACC  Advanced Heart Failure/Transplantation/[facility]

## 2022-08-08 NOTE — Clinical Note
8/8/2022      RE: Rajni Lara  [address] [facility] [address]       Dear Colleague,    Thank you for the opportunity to participate in the care of your patient, Rajni Lara, at the [facility] HEART CLINIC [place] [facility]. Please see a copy of my visit note below.    No notes on file    Please do not hesitate to contact me if you have any questions/concerns.     Sincerely,     Mark Ashton MD

## 2022-08-08 NOTE — PROGRESS NOTES
" LESLIE is a 66 year old who is being evaluated via a billable video visit.      How would you like to obtain your AVS? MyChart  If the video visit is dropped, the invitation should be resent by: Text to cell phone: [phone]   Will anyone else be joining your video visit?   pts wife  Taylor Asencio, VF/CMA    {If patient encounters technical issues they should call [phone] :[id number]}      Video-Visit Details    Video Start Time: {video visit start/end time for provider to select:357531}    Type of service:  Video Visit    Video End Time:{video visit start/end time for provider to select:917298}    Originating Location (pt. Location): {video visit patient location:240755::\"Home\"}    Distant Location (provider location):  [facility]     Platform used for Video Visit: {Virtual Visit Platforms:878155::\"[employer]\"}    Neurocardiomyopathy Clinic Progress Note    Name: Rajni Lara  : 1955  MRN: [id number]    2022    Dear Dr. Rodriguez and Dr. Santiago,     I had the pleasure of conducting a virtual video visit with Rajni Lara, a 66 year old man today in the [facility] Neurocardiomyopathy Clinic for a follow up of myotonic dystrophy type 2 associated cardiomyopathy.     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 He also has a family history of myotonic dystrophy in his sister and also probably his mother and maternal grandfather. He saw Dr. Cano in [facility] clinic on 1/10/22. He had an echocardiogram in  and was incidentally idenitied to have atrial flutter which showed an LVEF of 40-45%. In  he had a successful cardioversion for atrial flutter. He had a echo in 2009 which showed an LVEF of 50-55% and normal RV function. He had a nuclear stress test in  which showed thinning at the inferior base and no ischemia. He has not had cardiovascular follow up since then.     I last " saw him in clinic on 2022. He had a cardiac MRI which showed an LVEF of 46%, RVEF 47%, JEFFERSON, and mid myocardial enhancement in the septum, and no ischemia. His ziopatch monitor demonstrated sinus with 2.3% PVCs, no VT, and brief AV Wenckebach.         REVIEW OF SYSTEMS: 10 point ROS neg other than the symptoms noted above in the HPI.    PAST MEDICAL HISTORY:   1. Myotonic dystrophy type 2  2. History of atrial flutter s/p [facility]   3. History of non ischemic cardiomyopathy   4. BPH      ALLERGIES:    Allergies   Allergen Reactions     Sulfa Drugs Dermatitis, Headache and Photosensitivity       MEDICATIONS:   acetaminophen (TYLENOL) 500 MG tablet, Take 1,000 mg by mouth  aspirin (ASA) 81 MG chewable tablet, Take 81 mg by mouth daily  finasteride (PROSCAR) 5 MG tablet, Take 1 tablet by mouth every other day  nystatin-triamcinolone (MYCOLOG) 670227-4.1 UNIT/GM-% external ointment, Apply topically 2 times daily  tamsulosin (FLOMAX) 0.4 MG capsule, TAKE ONE CAPSULE BY MOUTH AT BEDTIME    No current facility-administered medications on file prior to visit.      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   Tobacco: never  Alcohol: rarely   Illicits: now  His family is from [facility]       FAMILY HISTORY:  His family is from [place] [facility] [place]. He has multiple maternal family members with myotonic dystrophy.   Mom when 70 when falling and missing steps and in her 80s she was bedbound.   Sister is a physician and has MD2  Maternal grand father had a heart attack 59, and  and also had some leg weakness  Maternal uncle sudden death at age 64    PHYSICAL EXAM:   There were no vitals taken for this visit.  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 He has a history of atrial flutter and a mildly reduced LVEF, but he has not had recent cardiovascular follow up. He has had a syncopal event over a year ago associated with dysphagia and most recently had a similar presyncopal event last week.      1. 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 Presyncope  3. History of atrial flutter  4. History of non-ischemic cardiomyopathy with subsequently recovered LVEF    Overall, he is well compensated and euvolemic. His blood pressure is mildly elevated and was similarly elevated a his visit with Dr. Rodriguez. He has normal end organ function. His BNP is normal and his troponin is mildly elevated, which can be elevated in patients with myotonic dystrophy.  Given his myotonic dystrophy, history of atrial flutter,  and recent presyncope, I would like to obtain an ECG and ziopatch monitor today. Additionally, he should have a cardiac MRI with stress and contrast to assess biventricular function, fibrosis, and perfusion in the setting of myotonic dystrophy and prior mild LV dysfunction.     PLAN:   -ECG  -ziopatch monitor  -CMR with contrast and stress  -virtual visit for follow up of results  -annual visit which can be coordinated with Dr. Rodriguez' visit     75 minutes on DOS for chart review, patient history and exam, counseling, review of diagnostic testing, coordination of care and documentation.     Thank you for allowing me to participate in the care of your patient. Please do not hesitate to contact me if you have any questions.     Sincerely,   Forum     Forum MD Daisha, PhD, [facility]  Advanced Heart  Failure/Transplantation/[facility]  [facility]/[employer]

## 2022-08-08 NOTE — LETTER
Date:August 23, 2022      Patient was self referred, no letter generated. Do not send.        [facility] Health Information

## 2022-08-08 NOTE — NURSING NOTE
Chief Complaint   Patient presents with     Follow Up     MRI results 7/18, pt would like to discuss the results and how they relate to results from April, he states there is a big difference between the results and wondering what could be the cause since there has not been any unexpected events between the two, pt flagged aspirin for removal during e check in, states he was having burst blood vessels in his eyes and since stopping aspirin that has also stopped, no other vitals to report today     Patient declined individual allergy and medication review by support staff because patient denies any changes since echeck-in completion and states all information entered during echeck-in remains accurate.    Taylor Asencio, VF/CMA

## 2022-08-08 NOTE — PATIENT INSTRUCTIONS
"You were seen today in the Cardiovascular Clinic at the [facility].      Cardiology Providers you saw during your visit:  Dr. Mark Ashton     Recommendations:   Start taking losartan 12.5 mg once daily.   RN call in 2 weeks to see how you are feeling on this medication.  If you are feeling well in 2 weeks, we will start another medication, spironolactone 12.5 mg once daily.  Labs (BMP) in 2 weeks after starting spironolactone.  Referral to EP - Dr. Hadley. Please call [phone] to schedule.  Follow up with Dr. Ashton in January or February with labs.  Abrahan reviewed, sinus rhythm with 2.3% premature ventricular tachycardia, and one episode of second degree AV block.       Thank you for your visit today!   Please MyChart message or call if you have any questions or concerns.      During Business Hours:  [phone], option # 1 \"To leave a message for your care team\"     After hours, weekends or holidays:   [phone], Option #4  Ask to speak to the On-Call Cardiologist. Inform them you are a heart failure patient at the [facility].      Joan Tolbert RN BSN   Cardiology Nurse Coordinator - Heart Failure/C.O.R.E. [facility]  [phone] option 1 to schedule an appointment or leave a message for your care team      "

## 2022-08-22 ENCOUNTER — MYC MEDICAL ADVICE (OUTPATIENT)
Dept: CARDIOLOGY | Facility: CLINIC | Age: 67
End: 2022-08-22

## 2022-09-21 ENCOUNTER — LAB (OUTPATIENT)
Dept: LAB | Facility: CLINIC | Age: 67
End: 2022-09-21
Payer: COMMERCIAL

## 2022-09-21 ENCOUNTER — OFFICE VISIT (OUTPATIENT)
Dept: CARDIOLOGY | Facility: CLINIC | Age: 67
End: 2022-09-21
Attending: INTERNAL MEDICINE
Payer: COMMERCIAL

## 2022-09-21 VITALS
DIASTOLIC BLOOD PRESSURE: 82 MMHG | SYSTOLIC BLOOD PRESSURE: 147 MMHG | HEIGHT: 76 IN | WEIGHT: 216.9 LBS | BODY MASS INDEX: 26.41 KG/M2 | HEART RATE: 58 BPM | OXYGEN SATURATION: 99 %

## 2022-09-21 DIAGNOSIS — Z95.0 BIVENTRICULAR CARDIAC PACEMAKER IN SITU: ICD-10-CM

## 2022-09-21 DIAGNOSIS — I45.2 BIFASCICULAR BLOCK: Primary | ICD-10-CM

## 2022-09-21 DIAGNOSIS — G71.11 MYOTONIC DYSTROPHY, TYPE 2 (H): ICD-10-CM

## 2022-09-21 DIAGNOSIS — Z13.6 ENCOUNTER FOR SCREENING FOR CARDIOVASCULAR DISORDERS: ICD-10-CM

## 2022-09-21 LAB
ANION GAP SERPL CALCULATED.3IONS-SCNC: 11 MMOL/L (ref 7–15)
BUN SERPL-MCNC: 17.4 MG/DL (ref 8–23)
CALCIUM SERPL-MCNC: 9.9 MG/DL (ref 8.8–10.2)
CHLORIDE SERPL-SCNC: 103 MMOL/L (ref 98–107)
CREAT SERPL-MCNC: 0.49 MG/DL (ref 0.67–1.17)
DEPRECATED HCO3 PLAS-SCNC: 27 MMOL/L (ref 22–29)
GFR SERPL CREATININE-BSD FRML MDRD: >90 ML/MIN/1.73M2
GLUCOSE SERPL-MCNC: 90 MG/DL (ref 70–99)
POTASSIUM SERPL-SCNC: 4.4 MMOL/L (ref 3.4–5.3)
SODIUM SERPL-SCNC: 141 MMOL/L (ref 136–145)

## 2022-09-21 PROCEDURE — 36415 COLL VENOUS BLD VENIPUNCTURE: CPT | Performed by: PATHOLOGY

## 2022-09-21 PROCEDURE — 93005 ELECTROCARDIOGRAM TRACING: CPT

## 2022-09-21 PROCEDURE — 99215 OFFICE O/P EST HI 40 MIN: CPT | Performed by: INTERNAL MEDICINE

## 2022-09-21 PROCEDURE — G0463 HOSPITAL OUTPT CLINIC VISIT: HCPCS | Mod: 25

## 2022-09-21 PROCEDURE — 80048 BASIC METABOLIC PNL TOTAL CA: CPT | Performed by: PATHOLOGY

## 2022-09-21 RX ORDER — SODIUM CHLORIDE 9 MG/ML
INJECTION, SOLUTION INTRAVENOUS CONTINUOUS
Status: CANCELLED | OUTPATIENT
Start: 2022-09-21

## 2022-09-21 RX ORDER — CEFAZOLIN SODIUM 2 G/50ML
2 SOLUTION INTRAVENOUS
Status: CANCELLED | OUTPATIENT
Start: 2022-09-21

## 2022-09-21 RX ORDER — LIDOCAINE 40 MG/G
CREAM TOPICAL
Status: CANCELLED | OUTPATIENT
Start: 2022-09-21

## 2022-09-21 ASSESSMENT — PAIN SCALES - GENERAL: PAINLEVEL: NO PAIN (0)

## 2022-09-21 NOTE — LETTER
9/21/2022      RE: Rajni Lara  [address] [facility] [address]       Dear Colleague,    Thank you for the opportunity to participate in the care of your patient, Rajni Lara, at the [facility] [place] at [facility]. Please see a copy of my visit note below.        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     Myotonic Dystrophy Type II NICM LVEF 46%, NYHA II:  1. ACEi/ARB: Continue Losartan.  2. BB: Not currently on due to bradycardia and bifascicular block on ECG   3. Aldosterone antagonist: Not currently required.  4. SCD prophylaxis:I discussed with the patient and referring physician the indications for prophylactic pacing. If a pacer is needed, given anticipation of higher V pacing percentages and LVEF ~40-45%, would favor CRTP. There is a class IIa indication for pacing if WA>240 ms and QRS >140 ms, or if HV >70 ms on invasive testing. The patient is under these limits currently, however, the increase in the last few months warrants close follow-up. If the patient has any new symptoms or EKG findings crossing these limits, we would recommend implanting a prophylactic pacer, particularly a CRT-P given LV dysfunction..  We discussed that some of his syncope/dizzienss may be swallow syncope related while some have more concerning features and could be sonal/conduction driven, but that pacing may not address/resolve all his dizziness symptoms.   5. Fluid status: appears euvolemic on exam, not requiring diuretics.      We will have the patient follow in 4-6 months or so with an EKG to monitor progression.       History of Present Illness/Subjective    Mr. Rajni Lara is a 66 year old male who comes in today for EP  consultation of conduction delays in MD patient.     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     He has known gene positive myotonic dystrophy type 2. In 2008, he had an echo which showed LVEF 40-45% and he was found to have AFL. He had a DCCVs in 2009. No recurrences since. A follow-up echo showed LVEF 50-55% in sinus. NM stress test in 2009 which showed thinning at the inferior base and no ischemia.He was lost to Cardiology follow-up until he res established with Dr. Ashton. A CMR from 7/2022 showed LVEF 46%, JEFFERSON, and LGE mid myocardial enhancement in the septum, and no ischemia. His ziopatch monitor from, 4/25/22-5/1/22 demonstrated sinus with 2.3% PVCs, no VT, and brief AV Wenckebach at 92bpm with sinus slowing prior. ECG shows bifascicular block and he was referred to EP for conduction delays in a myotonic dystrophy patient.     He reports feeling at baseline. He had an episode of syncope in 12/2020, after standing for a couple of minutes, he passed out while standing. He had some SOB after eating and drinking soda and has had some dizzy spells since. ECG has evolved from RBBB to bifascicular block from April to July, with SD prolonging from 200 to 224 ms range, along with widening of the QRS from around 120 ms when measured manually to >140 ms.. He denies chest discomfort, palpitations, abdominal fullness/bloating or peripheral edema, shortness of breath, paroxysmal nocturnal dyspnea, orthopnea, lightheadedness, dizziness, pre-syncope, or syncope. Presenting 12 lead ECG shows SB 1 AVB bifasicular block Vent Rate 55 bpm,  ms,  ms, QTc 476 ms. Current cardiac medications include: Losartan and ASA.     Family History:   He was diagnosed with MD earlier this year after his sister was diagnosed with it. His mother was bedridden for the last 7-8 years of her  "life,  at 87, and had very easy falls before that  Kids 2 tested negative and one tested positive  Brother tested negative  He has signs of wekaness, tough time going up steps and getting up chairs      I have reviewed and updated the patient's Past Medical History, Social History, Family History and Medication List.     Cardiographics (Personally Reviewed) :   22 Echo:   Interpretation Summary  Left ventricular function is decreased. The ejection fraction is 45-50% (mildly reduced).  The right ventricle is normal size.  Global right ventricular function is normal.  No significant valvular abnormalities were noted.  The inferior vena cava was normal in size with preserved respiratory variability.  No pericardial effusion is present.    22 CMR:  1. The LV is normal in cavity size and wall thickness. The global systolic function is mildly reduced. The LVEF is 46%. There are no regional wall motion abnormalities.  2. The RV is normal in cavity size. The global systolic function is mildly reduced. The RVEF is 47%.   3. Both atria are severely enlarged.  4. There is no significant valvular disease.   5. Late gadolinium enhancement imaging shows mid-myocardial enhancement in the septum. This is often seen in non-ischemic DCM, and is not typical for dystrophin-related CM (typically involves posterior wall). There is no myocardial edema.   6. Regadenoson stress perfusion imaging shows no ischemia.  7. There is no pericardial effusion or thickening.  8. There is no intracardiac thrombus.  CONCLUSIONS: Mild NICM, LVEF 46% and RVEF 47%. No myocardial ischemia, with mild non-ischemic fibrosis in  the septum.           Physical Examination   BP (!) 147/82 (BP Location: Left arm, Patient Position: Sitting, Cuff Size: Adult Regular)   Pulse 58   Ht 1.925 m (6' 3.79\")   Wt 98.4 kg (216 lb 14.4 oz)   SpO2 99%   BMI 26.55 kg/m    Wt Readings from Last 3 Encounters:   22 99.3 kg (218 lb 14.4 oz)   01/10/22 " "100.7 kg (222 lb)   BP (!) 147/82 (BP Location: Left arm, Patient Position: Sitting, Cuff Size: Adult Regular)   Pulse 58   Ht 1.925 m (6' 3.79\")   Wt 98.4 kg (216 lb 14.4 oz)   SpO2 99%   BMI 26.55 kg/m      General Appearance:   Alert, well-appearing and in no acute distress.   HEENT: Atraumatic, normocephalic. PERRL.  MMM.   Chest/Lungs:   Respirations unlabored.  Lungs are clear to auscultation.   Cardiovascular:   Regular rate and rhythm.  S1/S2. No murmur.    Abdomen:  Soft, nontender, nondistended.   Extremities: No cyanosis or clubbing. No edema.    Musculoskeletal: Moves all extremities.  Gait normal.   Skin: Warm, dry, intact.    Neurologic: Mood and affect are appropriate.  Alert and oriented to person, place, time, and situation.          Medications  Allergies   Current Outpatient Medications   Medication Sig Dispense Refill     acetaminophen (TYLENOL) 500 MG tablet Take 1,000 mg by mouth       aspirin (ASA) 81 MG chewable tablet Take 81 mg by mouth daily       finasteride (PROSCAR) 5 MG tablet Take 1 tablet by mouth every other day       losartan (COZAAR) 25 MG tablet Take 0.5 tablets (12.5 mg) by mouth daily 45 tablet 3     nystatin-triamcinolone (MYCOLOG) 924113-4.1 UNIT/GM-% external ointment Apply topically 2 times daily       tamsulosin (FLOMAX) 0.4 MG capsule TAKE ONE CAPSULE BY MOUTH AT BEDTIME      Allergies   Allergen Reactions     Sulfa Drugs Dermatitis, Headache and Photosensitivity         Lab Results (Personally Reviewed)    Chemistry/lipid CBC Cardiac Enzymes/BNP/TSH/INR   Lab Results   Component Value Date    BUN 18 04/25/2022     04/25/2022    CO2 28 04/25/2022     Creatinine   Date Value Ref Range Status   04/25/2022 0.56 (L) 0.66 - 1.25 mg/dL Final       Lab Results   Component Value Date    CHOL 220 (H) 04/25/2022    HDL 63 04/25/2022     (H) 04/25/2022      Lab Results   Component Value Date    WBC 8.1 04/25/2022    HGB 16.3 04/25/2022    HCT 48.0 04/25/2022    MCV " 93 04/25/2022     04/25/2022    Lab Results   Component Value Date    TSH 2.60 01/10/2022        The patient states understanding and is agreeable with the plan.   Jenaro Hadley MD [facility]  Cardiology - Electrophysiology      Total time spent on patient visit, reviewing notes, imaging, labs, orders, and completing necessary documentation: 60 minutes.                      Please do not hesitate to contact me if you have any questions/concerns.     Sincerely,     Jenaro Hadley MD

## 2022-09-21 NOTE — PATIENT INSTRUCTIONS
You are scheduled for an implant of a Cardiac Resynchronization Therapy Pacemaker (CRT-P).      You will need to have a covid swab done prior to procedure.          - At-home, rapid antigen test:              - Perform within 1-2 days of procedure              - If negative, take a photo of the result or report the test result on the day of procedure               - If positive, contact Eloise Renae at [phone]              - Where to find: For purchase at pharmacies, or you can order free tests at covid.gov/tests           - PCR (if you can't find or do a rapid antigen test, OR if you are staying overnight):              - Perform within 4 days of procedure                             - If being performed at an outside lab, result needs to be faxed to [phone].       Visitor Policy: Two visitors.      Below are instructions, if you have questions please contact our office.     1. You should arrive at the [facility]   ([address] , [facility]) to the [facility] Waiting Room at ___________ on   ______________.  2. Do not eat for 8 hours prior to arrival, you can drink water up until 2 hours prior.  3. The morning of your procedure, you may take your scheduled medications with a sip of water.  4. You will likely discharge the same day and need a .  5. Use the antibacterial wipes the night before and morning of your procedure. Follow the included instructions.       Post-Procedure Instructions  Wound Care  If no Dermabond has been applied to your incision: Keep your incision (surgery wound) dry for 3 days.  After 3 days, you may remove the outer bandage.  Keep the strips of tape on.  They will be removed at your clinic visit.  If Dermabond has been applied to your incision,  do not apply powder or lotion to the incision for 14 days. You may shower in the morning.  Check for signs of infection each day.  These include increased redness, swelling, drainage or a fever over 101 F (38.3  "C).  Call us immediately if you see any of   these signs.  If there are no signs of infection, you may shower in 3 days.  Do not submerge the incision (in a bath tub, hot tub, or swimming pool) until fully healed.  Pain  You may have mild to moderate pain for 3 to 5 days.  Take acetaminophen (Tylenol) or ibuprofen (Advil) for the pain.  Call us if the pain is severe or lasts more than 5 days.  Activity  You should slowly go back to your normal activities after 24 hours.  Healing will take 4 to 6 weeks.  No driving for 3 days  Avoid climbing a ladder alone.  It is best to stay within 4 feet of the ground.  Avoid anything that may cause rough contact or a hard hit to your chest.  This includes football, hockey, and other contact sports.  Do not go swimming or boating alone.    FOR ATLEAST 4 WEEKS:  Do not raise your affected arm above your shoulder.  Do not use your affected arm to push, pull, or lift anything over 10 pounds.  Avoid repetitive upper body activities for 6 weeks (ie: golf, swimming, and weight lifting)        Follow Up Appointments Date & Time   7-10 day incision check with device clinic    3 month follow up visit with ECHO, EKG, device clinic, & provider              If you have further questions, please utilize [employer] to contact us.   If your question concerns the above instructions, contact:  Carolina Nettles RN   Electrophysiology Nurse Coordinator.  [phone]    If your question concerns the schedule/appointment times, contact:  [facility]   [phone]    Device Clinic (Pacemakers, Defibrillators, Loop Recorders)  [phone]        Preparing the Skin Before Surgery  Preparing or \"prepping\" skin before surgery can reduce the risk of infection at the surgical site. To make the process easier, this facility has chosen disposable cloths moistened with rinse-free 2% Chlorhexidine Gluconate antiseptic solution designed to reduce the bacteria on the skin. The steps below " outline the prepping process and should be carefully followed.    Prep the skin at the following time(s):    - If you wish to shower, bathe or shampoo your hair, do so the night before and prep your skin afterwards for the first time using one package of cloths.    - Skin must be prepped the morning of the procedure using the second package of cloths.        Prep The Night Before Procedure  Do not allow this product to come in contact with your eyes, ears, mouth or mucous membranes.   Reach into one of the packages, remove the two cloths and place onto a clean table.  Use one clean cloth to prep each are of the body. One cloth for #1 - neck & chest. One cloth for #2 & #3 - both arms, shoulder to fingertips including armpits. Wipe each area in a back and forth motion for 3 minutes. Be sure to wipe each area thoroughly.  Do not rinse or apply any lotions, moisturizer or make up after prepping.  Discard cloths in trash can.   Allow your skin to air dry. Dress in clean clothes/sleepwear      Prepping your skin on the morning of surgery:  Do not shower, bathe or shampoo hair.  Open a new package and follow the instructions listed above.

## 2022-09-21 NOTE — PROGRESS NOTES
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     Myotonic Dystrophy Type II NICM LVEF 46%, NYHA II:  1. ACEi/ARB: Continue Losartan.  2. BB: Not currently on due to bradycardia and bifascicular block on ECG   3. Aldosterone antagonist: Not currently required.  4. SCD prophylaxis:I discussed with the patient and referring physician the indications for prophylactic pacing. If a pacer is needed, given anticipation of higher V pacing percentages and LVEF ~40-45%, would favor CRTP. There is a class IIa indication for pacing if IN>240 ms and QRS >140 ms, or if HV >70 ms on invasive testing. The patient is under these limits currently, however, the increase in the last few months warrants close follow-up. If the patient has any new symptoms or EKG findings crossing these limits, we would recommend implanting a prophylactic pacer, particularly a CRT-P given LV dysfunction..  We discussed that some of his syncope/dizzienss may be swallow syncope related while some have more concerning features and could be sonal/conduction driven, but that pacing may not address/resolve all his dizziness symptoms.   5. Fluid status: appears euvolemic on exam, not requiring diuretics.      We will have the patient follow in 4-6 months or so with an EKG to monitor progression.       History of Present Illness/Subjective    Mr. Rajni Lara is a 66 year old male who comes in today for EP consultation of conduction delays in MD patient.     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     He has known gene positive myotonic dystrophy type 2. In 2008, he had an echo which showed  LVEF 40-45% and he was found to have AFL. He had a DCCVs in . No recurrences since. A follow-up echo showed LVEF 50-55% in sinus. NM stress test in  which showed thinning at the inferior base and no ischemia.He was lost to Cardiology follow-up until he res established with Dr. Ashton. A CMR from 2022 showed LVEF 46%, JEFFERSON, and LGE mid myocardial enhancement in the septum, and no ischemia. His ziopatch monitor from, 22-22 demonstrated sinus with 2.3% PVCs, no VT, and brief AV Wenckebach at 92bpm with sinus slowing prior. ECG shows bifascicular block and he was referred to EP for conduction delays in a myotonic dystrophy patient.     He reports feeling at baseline. He had an episode of syncope in 2020, after standing for a couple of minutes, he passed out while standing. He had some SOB after eating and drinking soda and has had some dizzy spells since. ECG has evolved from RBBB to bifascicular block from April to July, with [place] prolonging from 200 to 224 ms range, along with widening of the QRS from around 120 ms when measured manually to >140 ms.. He denies chest discomfort, palpitations, abdominal fullness/bloating or peripheral edema, shortness of breath, paroxysmal nocturnal dyspnea, orthopnea, lightheadedness, dizziness, pre-syncope, or syncope. Presenting 12 lead ECG shows SB 1 AVB bifasicular block Vent Rate 55 bpm,  ms,  ms, QTc 476 ms. Current cardiac medications include: Losartan and ASA.     Family History:   He was diagnosed with MD earlier this year after his sister was diagnosed with it. His mother was bedridden for the last 7-8 years of her life,  at 87, and had very easy falls before that  Kids 2 tested negative and one tested positive  Brother tested negative  He has signs of wekaness, tough time going up steps and getting up chairs      I have reviewed and updated the patient's Past Medical History, Social History, Family History and Medication List.  "    Cardiographics (Personally Reviewed) :   5/2/22 Echo:   Interpretation Summary  Left ventricular function is decreased. The ejection fraction is 45-50% (mildly reduced).  The right ventricle is normal size.  Global right ventricular function is normal.  No significant valvular abnormalities were noted.  The inferior vena cava was normal in size with preserved respiratory variability.  No pericardial effusion is present.    7/18/22 CMR:  1. The LV is normal in cavity size and wall thickness. The global systolic function is mildly reduced. The LVEF is 46%. There are no regional wall motion abnormalities.  2. The RV is normal in cavity size. The global systolic function is mildly reduced. The RVEF is 47%.   3. Both atria are severely enlarged.  4. There is no significant valvular disease.   5. Late gadolinium enhancement imaging shows mid-myocardial enhancement in the septum. This is often seen in non-ischemic DCM, and is not typical for dystrophin-related CM (typically involves posterior wall). There is no myocardial edema.   6. Regadenoson stress perfusion imaging shows no ischemia.  7. There is no pericardial effusion or thickening.  8. There is no intracardiac thrombus.  CONCLUSIONS: Mild NICM, LVEF 46% and RVEF 47%. No myocardial ischemia, with mild non-ischemic fibrosis in  the septum.           Physical Examination   BP (!) 147/82 (BP Location: Left arm, Patient Position: Sitting, Cuff Size: Adult Regular)   Pulse 58   Ht 1.925 m (6' 3.79\")   Wt 98.4 kg (216 lb 14.4 oz)   SpO2 99%   BMI 26.55 kg/m    Wt Readings from Last 3 Encounters:   04/25/22 99.3 kg (218 lb 14.4 oz)   01/10/22 100.7 kg (222 lb)   BP (!) 147/82 (BP Location: Left arm, Patient Position: Sitting, Cuff Size: Adult Regular)   Pulse 58   Ht 1.925 m (6' 3.79\")   Wt 98.4 kg (216 lb 14.4 oz)   SpO2 99%   BMI 26.55 kg/m      General Appearance:   Alert, well-appearing and in no acute distress.   HEENT: Atraumatic, normocephalic. PERRL.  " MMM.   Chest/Lungs:   Respirations unlabored.  Lungs are clear to auscultation.   Cardiovascular:   Regular rate and rhythm.  S1/S2. No murmur.    Abdomen:  Soft, nontender, nondistended.   Extremities: No cyanosis or clubbing. No edema.    Musculoskeletal: Moves all extremities.  Gait normal.   Skin: Warm, dry, intact.    Neurologic: Mood and affect are appropriate.  Alert and oriented to person, place, time, and situation.          Medications  Allergies   Current Outpatient Medications   Medication Sig Dispense Refill     acetaminophen (TYLENOL) 500 MG tablet Take 1,000 mg by mouth       aspirin (ASA) 81 MG chewable tablet Take 81 mg by mouth daily       finasteride (PROSCAR) 5 MG tablet Take 1 tablet by mouth every other day       losartan (COZAAR) 25 MG tablet Take 0.5 tablets (12.5 mg) by mouth daily 45 tablet 3     nystatin-triamcinolone (MYCOLOG) 540865-2.1 UNIT/GM-% external ointment Apply topically 2 times daily       tamsulosin (FLOMAX) 0.4 MG capsule TAKE ONE CAPSULE BY MOUTH AT BEDTIME      Allergies   Allergen Reactions     Sulfa Drugs Dermatitis, Headache and Photosensitivity         Lab Results (Personally Reviewed)    Chemistry/lipid CBC Cardiac Enzymes/BNP/TSH/INR   Lab Results   Component Value Date    BUN 18 04/25/2022     04/25/2022    CO2 28 04/25/2022     Creatinine   Date Value Ref Range Status   04/25/2022 0.56 (L) 0.66 - 1.25 mg/dL Final       Lab Results   Component Value Date    CHOL 220 (H) 04/25/2022    HDL 63 04/25/2022     (H) 04/25/2022      Lab Results   Component Value Date    WBC 8.1 04/25/2022    HGB 16.3 04/25/2022    HCT 48.0 04/25/2022    MCV 93 04/25/2022     04/25/2022    Lab Results   Component Value Date    TSH 2.60 01/10/2022        The patient states understanding and is agreeable with the plan.   Jenaro Hadley MD [facility]  Cardiology - Electrophysiology      Total time spent on patient visit, reviewing notes, imaging, labs, orders, and completing  necessary documentation: 60 minutes.

## 2022-09-21 NOTE — NURSING NOTE
Chief Complaint   Patient presents with     New Patient     Referral for conduction delays (bifascicular block) in myotonic dystrophy type 2 per Dr Ashton. JOSE Bautista       Vitals were taken, medications reconciled, and EKG performed.    Wang Mazariegos  1:02 PM

## 2022-09-21 NOTE — LETTER
Date:October 10, 2022      Patient was self referred, no letter generated. Do not send.        [facility] Health Information

## 2022-09-22 ENCOUNTER — MYC MEDICAL ADVICE (OUTPATIENT)
Dept: CARDIOLOGY | Facility: CLINIC | Age: 67
End: 2022-09-22

## 2022-09-22 DIAGNOSIS — I42.8 NONISCHEMIC CARDIOMYOPATHY (H): ICD-10-CM

## 2022-09-22 DIAGNOSIS — G71.11 MYOTONIC DYSTROPHY, TYPE 2 (H): ICD-10-CM

## 2022-09-22 DIAGNOSIS — I45.2 BIFASCICULAR BLOCK: Primary | ICD-10-CM

## 2022-09-23 LAB
ATRIAL RATE - MUSE: 55 BPM
DIASTOLIC BLOOD PRESSURE - MUSE: NORMAL MMHG
INTERPRETATION ECG - MUSE: NORMAL
P AXIS - MUSE: 64 DEGREES
PR INTERVAL - MUSE: 224 MS
QRS DURATION - MUSE: 142 MS
QT - MUSE: 498 MS
QTC - MUSE: 476 MS
R AXIS - MUSE: -65 DEGREES
SYSTOLIC BLOOD PRESSURE - MUSE: NORMAL MMHG
T AXIS - MUSE: 69 DEGREES
VENTRICULAR RATE- MUSE: 55 BPM

## 2022-10-03 ENCOUNTER — TELEPHONE (OUTPATIENT)
Dept: CARDIOLOGY | Facility: CLINIC | Age: 67
End: 2022-10-03

## 2022-10-03 ENCOUNTER — HEALTH MAINTENANCE LETTER (OUTPATIENT)
Age: 67
End: 2022-10-03

## 2022-10-03 NOTE — CONFIDENTIAL NOTE
We called patient this morning to follow up with him after recent clinic visit after ability to discuss with Dr. Ashton.   Overall, we agreed that we would continue to monitor him and follow-up with him in 4-6 months with another ECG. We discussed he is showing signs of conduction system disease, but not quite at the need for a prophylactic pacemaker at this time. However, with his MD2 he is higher risk of of needing a PPM in future and when conduction (AZ and QRS values) gets to certain measurements.   He states understanding and is agreeable with the plan.   AUGUSTO Ye CNP  Electrophysiology Consult Service  Pager: [phone]

## 2022-10-03 NOTE — TELEPHONE ENCOUNTER
Labs/ medicinal alternatives to implantation of CRT-P    Donna Jordan, APRN CNP  You 2 minutes ago (8:52 AM)     LV    Dr. Hadley called OJ this morning and clarified to him his discussion in clinic last week. He told pt that he is showing signs of conduction system disease, but it is not quite advanced enough to require CRTP at this time. He wants to follow up with him in 4-6 months with ECG.   Thanks   LVLISA

## 2023-01-09 DIAGNOSIS — Z13.6 ENCOUNTER FOR SCREENING FOR CARDIOVASCULAR DISORDERS: ICD-10-CM

## 2023-01-09 DIAGNOSIS — I50.89 OTHER HEART FAILURE (H): ICD-10-CM

## 2023-01-09 DIAGNOSIS — G71.11 MYOTONIC DYSTROPHY, TYPE 2 (H): Primary | ICD-10-CM

## 2023-01-20 DIAGNOSIS — G71.11 MYOTONIC DYSTROPHY, TYPE 2 (H): Primary | ICD-10-CM

## 2023-01-23 ENCOUNTER — LAB (OUTPATIENT)
Dept: LAB | Facility: CLINIC | Age: 68
End: 2023-01-23
Payer: COMMERCIAL

## 2023-01-23 ENCOUNTER — OFFICE VISIT (OUTPATIENT)
Dept: CARDIOLOGY | Facility: CLINIC | Age: 68
End: 2023-01-23
Attending: INTERNAL MEDICINE
Payer: COMMERCIAL

## 2023-01-23 ENCOUNTER — OFFICE VISIT (OUTPATIENT)
Dept: NEUROLOGY | Facility: CLINIC | Age: 68
End: 2023-01-23
Payer: COMMERCIAL

## 2023-01-23 ENCOUNTER — RESULTS ONLY (OUTPATIENT)
Dept: CARDIOLOGY | Facility: CLINIC | Age: 68
End: 2023-01-23

## 2023-01-23 VITALS
BODY MASS INDEX: 27.73 KG/M2 | DIASTOLIC BLOOD PRESSURE: 77 MMHG | OXYGEN SATURATION: 99 % | SYSTOLIC BLOOD PRESSURE: 126 MMHG | HEART RATE: 50 BPM | HEIGHT: 75 IN | WEIGHT: 223 LBS

## 2023-01-23 VITALS
OXYGEN SATURATION: 100 % | DIASTOLIC BLOOD PRESSURE: 74 MMHG | BODY MASS INDEX: 27.18 KG/M2 | SYSTOLIC BLOOD PRESSURE: 118 MMHG | WEIGHT: 223.2 LBS | HEIGHT: 76 IN | HEART RATE: 55 BPM

## 2023-01-23 DIAGNOSIS — R06.83 SNORING: ICD-10-CM

## 2023-01-23 DIAGNOSIS — G71.11 MYOTONIC DYSTROPHY, TYPE 2 (H): ICD-10-CM

## 2023-01-23 DIAGNOSIS — R00.2 PALPITATIONS: Primary | ICD-10-CM

## 2023-01-23 DIAGNOSIS — Z13.6 ENCOUNTER FOR SCREENING FOR CARDIOVASCULAR DISORDERS: ICD-10-CM

## 2023-01-23 DIAGNOSIS — G71.11 MYOTONIC DYSTROPHY, TYPE 2 (H): Primary | ICD-10-CM

## 2023-01-23 DIAGNOSIS — G95.9 MYELOPATHY (H): ICD-10-CM

## 2023-01-23 DIAGNOSIS — I50.89 OTHER HEART FAILURE (H): ICD-10-CM

## 2023-01-23 DIAGNOSIS — G47.19 EXCESSIVE DAYTIME SLEEPINESS: ICD-10-CM

## 2023-01-23 LAB
ALBUMIN SERPL BCG-MCNC: 4.3 G/DL (ref 3.5–5.2)
ALP SERPL-CCNC: 88 U/L (ref 40–129)
ALT SERPL W P-5'-P-CCNC: 36 U/L (ref 10–50)
ANION GAP SERPL CALCULATED.3IONS-SCNC: 7 MMOL/L (ref 7–15)
AST SERPL W P-5'-P-CCNC: 46 U/L (ref 10–50)
ATRIAL RATE - MUSE: 56 BPM
BILIRUB SERPL-MCNC: 0.6 MG/DL
BUN SERPL-MCNC: 15.7 MG/DL (ref 8–23)
CALCIUM SERPL-MCNC: 9.6 MG/DL (ref 8.8–10.2)
CHLORIDE SERPL-SCNC: 105 MMOL/L (ref 98–107)
CK SERPL-CCNC: 681 U/L (ref 39–308)
CREAT SERPL-MCNC: 0.51 MG/DL (ref 0.67–1.17)
DEPRECATED HCO3 PLAS-SCNC: 29 MMOL/L (ref 22–29)
DIASTOLIC BLOOD PRESSURE - MUSE: NORMAL MMHG
ERYTHROCYTE [DISTWIDTH] IN BLOOD BY AUTOMATED COUNT: 13.2 % (ref 10–15)
GFR SERPL CREATININE-BSD FRML MDRD: >90 ML/MIN/1.73M2
GLUCOSE SERPL-MCNC: 70 MG/DL (ref 70–99)
HCT VFR BLD AUTO: 44.7 % (ref 40–53)
HGB BLD-MCNC: 15.6 G/DL (ref 13.3–17.7)
INTERPRETATION ECG - MUSE: NORMAL
MAGNESIUM SERPL-MCNC: 2.3 MG/DL (ref 1.7–2.3)
MCH RBC QN AUTO: 32.2 PG (ref 26.5–33)
MCHC RBC AUTO-ENTMCNC: 34.9 G/DL (ref 31.5–36.5)
MCV RBC AUTO: 92 FL (ref 78–100)
NT-PROBNP SERPL-MCNC: 135 PG/ML (ref 0–900)
P AXIS - MUSE: 59 DEGREES
PLATELET # BLD AUTO: 198 10E3/UL (ref 150–450)
POTASSIUM SERPL-SCNC: 5.1 MMOL/L (ref 3.4–5.3)
PR INTERVAL - MUSE: 242 MS
PROT SERPL-MCNC: 6.9 G/DL (ref 6.4–8.3)
QRS DURATION - MUSE: 120 MS
QT - MUSE: 464 MS
QTC - MUSE: 447 MS
R AXIS - MUSE: -60 DEGREES
RBC # BLD AUTO: 4.85 10E6/UL (ref 4.4–5.9)
SODIUM SERPL-SCNC: 141 MMOL/L (ref 136–145)
SYSTOLIC BLOOD PRESSURE - MUSE: NORMAL MMHG
T AXIS - MUSE: -37 DEGREES
TROPONIN T SERPL HS-MCNC: 57 NG/L
VENTRICULAR RATE- MUSE: 56 BPM
WBC # BLD AUTO: 7.7 10E3/UL (ref 4–11)

## 2023-01-23 PROCEDURE — G0463 HOSPITAL OUTPT CLINIC VISIT: HCPCS | Mod: 25

## 2023-01-23 PROCEDURE — 80053 COMPREHEN METABOLIC PANEL: CPT | Performed by: PATHOLOGY

## 2023-01-23 PROCEDURE — G0463 HOSPITAL OUTPT CLINIC VISIT: HCPCS | Mod: 25 | Performed by: INTERNAL MEDICINE

## 2023-01-23 PROCEDURE — 83735 ASSAY OF MAGNESIUM: CPT | Performed by: PATHOLOGY

## 2023-01-23 PROCEDURE — 83880 ASSAY OF NATRIURETIC PEPTIDE: CPT | Performed by: PATHOLOGY

## 2023-01-23 PROCEDURE — 82550 ASSAY OF CK (CPK): CPT | Performed by: PATHOLOGY

## 2023-01-23 PROCEDURE — 99215 OFFICE O/P EST HI 40 MIN: CPT | Performed by: INTERNAL MEDICINE

## 2023-01-23 PROCEDURE — 93005 ELECTROCARDIOGRAM TRACING: CPT

## 2023-01-23 PROCEDURE — 84484 ASSAY OF TROPONIN QUANT: CPT | Performed by: PATHOLOGY

## 2023-01-23 PROCEDURE — 36415 COLL VENOUS BLD VENIPUNCTURE: CPT | Performed by: PATHOLOGY

## 2023-01-23 PROCEDURE — 94375 RESPIRATORY FLOW VOLUME LOOP: CPT | Performed by: INTERNAL MEDICINE

## 2023-01-23 PROCEDURE — 85027 COMPLETE CBC AUTOMATED: CPT | Performed by: PATHOLOGY

## 2023-01-23 PROCEDURE — 99213 OFFICE O/P EST LOW 20 MIN: CPT | Performed by: PSYCHIATRY & NEUROLOGY

## 2023-01-23 RX ORDER — LOSARTAN POTASSIUM 25 MG/1
12.5 TABLET ORAL
Status: ON HOLD | COMMUNITY
Start: 2022-08-08 | End: 2023-05-01

## 2023-01-23 RX ORDER — FINASTERIDE 5 MG/1
1 TABLET, FILM COATED ORAL EVERY MORNING
COMMUNITY
Start: 2015-06-03

## 2023-01-23 ASSESSMENT — PAIN SCALES - GENERAL
PAINLEVEL: NO PAIN (0)
PAINLEVEL: NO PAIN (0)

## 2023-01-23 NOTE — PATIENT INSTRUCTIONS
PFT (breathing test) today  Please get locally a sleep study and a cervical spine MRI. I will print orders for both- you should take them to your local Clinic to schedule    PT will see you today    Follow up 1 year

## 2023-01-23 NOTE — PROGRESS NOTES
Service Date: 2023    Forum KYRA Ashton MD  [facility]  [address] [place] [address]  [id number]    RE:  Rajni Lara  MRN:  [id number]  :  1955    Dear Forum:    I had the pleasure to see Mr. Lara in followup at the [facility].  He has genetically confirmed type 2 myotonic dystrophy.  I first saw him a year ago. Compared to last year, his proximal leg weakness is about the same.  He has some difficulty getting up from low-seated chairs or ascending stairs but it has not changed a lot.  He has not had any falls.  He does not use any cane or walker.  He does not have any worsening hand clumsiness or weakness from last year and he denies significant myotonia. He has occasional dysphagia.  He does not choke, but sometimes food feels like getting stuck.  He had syncope during swallowing in the past, but this fortunately has not happened in the last 12 months.  The episodes of food getting stuck occur about twice a month at most.  He did see Speech Therapy about this a year ago and got appropriate advice.  He denies sialorrhea, dysphonia or dysarthria.      He does not have any cardiac complaints.  He will see Cardiology (Dr Ashton) today.  He was seeing Dr. Hadley recently and he has bifascicular block on his EKG, but no recent syncopal episodes of chest pain are reported.  He did have labs today, which are pending including CBC, CMP, CK, troponin, and BNP.  He had a hemoglobin A1c in 2022 that was normal at 4.9%.  Lipid panel at the same time showed mildly elevated triglycerides at 158.  Otherwise, normal. Basic metabolic panel done on the same day, 2022, was basically unremarkable.      He does snore at night at times, similar to last year.  He has mild excessive daytime sleepiness.  I had asked for a sleep study to be done locally last year, but he did not do it for unclear reasons.  He does not have any dyspnea on  "exertion or significant orthopnea but had abnormal PFTs a year ago with FVC 72% and MIP -40.      He has bilateral hand essential tremor.  It is not very bothersome.  It does not significantly limit activities of daily living.  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  He does not think he needs any treatment for it.  Also, last year I ordered a cervical spine MRI due to his hyperreflexia in the upper and lower extremities with lack of jaw jerk and loss of vibration of the toes.  I wanted to rule out cervical myelopathy.  This was never scheduled, again for unclear reasons.    /77   Pulse 50   Ht 1.905 m (6' 3\")   Wt 101.2 kg (223 lb)   SpO2 99%   BMI 27.87 kg/m      I did a limited exam today. He still has no vibration at the right great toe and, at most, 2 seconds on the left.  He has brisk ankle and knee reflexes 3+ with spread and brisk upper extremity reflexes with spread.  He has bilateral Karen sign. He does not have a jaw jerk.  His toes are equivocal due to being very ticklish, but his foot tapping agility is good.    In summary, Mr. Lara has myotonic dystrophy type 2, with no significant progression of his muscle weakness from last year. There is no disease modifying FDA approved treatment, or clinical trial for myotonic type 2, currently available at the [facility]. We discussed a number of practical issues.  He will see our physical therapist today.  He does not have any worsening dysphagia, but if he does, he can work with his local speech therapist.  I will repeat his PFTs and I reordered the sleep study; it is the patient's responsibility to schedule it locally.  I will also reorder a cervical spine MRI to rule out myelopathy, because the clinical findings remain similar to last year, and I still have a low to modest index of suspicion of this diagnosis.      He will see Dr. Ashton from " Cardiology today; no new cardiac complaints reported to me today.  He has an annual Ophthalmology appointment; he will be seen again in 2023.  He is status post bilateral cataract removal.  He has no major GI complaints.      Follow up in 1 year or sooner if needed. TT spent on this encounter today 25 minutes, of which 10 face to face, 10 in post visit note dictation, editing, and orders, and 5 in pre-visit chart review.     Sincerely,    Reinaldo Rodriguez MD        D: 2023   T: 2023   MT: ravi    Name:     DERIAN MORALES  MRN:      [id number]        Account:      [id number]   :      1955           Service Date: 2023       Document: [id number]

## 2023-01-23 NOTE — LETTER
2023      RE: Rajni Lara  [address] [facility] [address]       Dear Colleague,    Thank you for the opportunity to participate in the care of your patient, Rajni Lara, at the [facility] [place] [facility]. Please see a copy of my visit note below.    Neurocardiomyopathy Clinic Progress Note    Name: Rajni Lara  : 1955  MRN: [id number]    2023    Dear Dr. Rodriguez and Dr. Santiago,     I had the pleasure of seeing Mr. Rajni Lara, a 67 year old man today in the [facility] for a follow up of myotonic dystrophy type 2 associated cardiomyopathy. He is accompanied by his wife today.     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 He also has a family history of myotonic dystrophy in his sister and also probably his mother and maternal grandfather. He saw Dr. Cano in [facility] on 1/10/22. He had an echocardiogram in  and was incidentally idenitied to have atrial flutter which showed an LVEF of 40-45%. In  he had a successful cardioversion for atrial flutter. He had a echo in 2009 which showed an LVEF of 50-55% and normal RV function. He had a nuclear stress test in  which showed thinning at the inferior base and no ischemia. He has not had cardiovascular follow up since then.     I last saw him on 2022. He had a cardiac MRI which showed an LVEF of 46%, RVEF 47%, JEFFERSON, and mid myocardial enhancement in the septum, and no ischemia. His ziopatch monitor demonstrated sinus with 2.3% PVCs, no VT, and brief AV Wenckebach. He saw EP regarding bifasicular block and he will follow up with Dr. Hadley in 4-6 months regarding CRT-P.  He has been biking every other day for 40-50 minutes with 5-6 resistance (0-8 range) and bikes around 11 miles. This is an increase from 2-3 times per week  previously. He continues to have difficulties going up the stairs. He has no dyspnea or chest pain with activity or rest. His weight is stable and he does not have edema, orthopnea, or PND. He has nocturia once a night. He has no palpitations, dizziness, lightheadedness, or pre/syncope. No change in snoring. His energy levels are ok and largely unchanged. His appetite is good.     His son and younger daughter tested negative for myotonic dystrophy type 2, his older daughter who lives in [place] tested positive. She has not been able to have cardiovascular screening in [place] thus far.     He is planning to go to the [employer] in April, but he is not sure how he will be able to manage walking on elevated aspects of the course.     REVIEW OF SYSTEMS: 10 point ROS neg other than the symptoms noted above in the HPI.    PAST MEDICAL HISTORY:   1. Myotonic dystrophy type 2  2. History of atrial flutter s/p [facility] 2009  3. History of non ischemic cardiomyopathy   4. BPH      ALLERGIES:    Allergies   Allergen Reactions     Sulfa Drugs Dermatitis, Headache and Photosensitivity       MEDICATIONS:   acetaminophen (TYLENOL) 500 MG tablet, Take 1,000 mg by mouth  aspirin (ASA) 81 MG chewable tablet, Take 81 mg by mouth daily (Patient not taking: Reported on 9/21/2022)  finasteride (PROSCAR) 5 MG tablet, Take 1 tablet by mouth every other day  losartan (COZAAR) 25 MG tablet, Take 0.5 tablets (12.5 mg) by mouth daily  nystatin-triamcinolone (MYCOLOG) 600473-1.1 UNIT/GM-% external ointment, Apply topically 2 times daily  tamsulosin (FLOMAX) 0.4 MG capsule, TAKE ONE CAPSULE BY MOUTH AT BEDTIME    No current facility-administered medications on file prior to visit.    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   Tobacco: never  Alcohol: rarely   Illicits: now  His family is from [place]       FAMILY HISTORY:  His family is from [place]. He has  "multiple maternal family members with myotonic dystrophy.   Mom when 70 when falling and missing steps and in her 80s she was bedbound.   Sister is a physician and has MD2  Maternal grand father had a heart attack 59, and  and also had some leg weakness  Maternal uncle sudden death at age 64  Son and younger daughter tested negative. Older daughter is positive for MD2      PHYSICAL EXAM:   /74 (BP Location: Right arm, Patient Position: Chair, Cuff Size: Adult Regular)   Pulse 55   Ht 1.923 m (6' 3.71\")   Wt 101.2 kg (223 lb 3.2 oz)   SpO2 100%   BMI 27.38 kg/m    General: comfortable, conversant, NAD  HEENT: normocephalic, atraumatic, anicteric,   Neck: Estimate JVP <7  CV: RRR, nl s1 and s2, no murmurs, gallops, or rubs   Lungs: CTAB, no crackles or wheezes, normal work of breathing  Abdomen: BS+, soft, non tender, non distended  Extremities: warm and well perfused, no edema      DIAGNOSTIC TESTING:      Latest Reference Range & Units 23 12:55   Sodium 136 - 145 mmol/L 141   Potassium 3.4 - 5.3 mmol/L 5.1   Chloride 98 - 107 mmol/L 105   Carbon Dioxide (CO2) 22 - 29 mmol/L 29   Urea Nitrogen 8.0 - 23.0 mg/dL 15.7   Creatinine 0.67 - 1.17 mg/dL 0.51 (L)   GFR Estimate >60 mL/min/1.73m2 >90   Calcium 8.8 - 10.2 mg/dL 9.6   Anion Gap 7 - 15 mmol/L 7   Magnesium 1.7 - 2.3 mg/dL 2.3   Albumin 3.5 - 5.2 g/dL 4.3   Protein Total 6.4 - 8.3 g/dL 6.9   Alkaline Phosphatase 40 - 129 U/L 88   ALT 10 - 50 U/L 36   AST 10 - 50 U/L 46   Bilirubin Total <=1.2 mg/dL 0.6   CK Total 39 - 308 U/L 681 (H)   Glucose 70 - 99 mg/dL 70   N-Terminal Pro Bnp 0 - 900 pg/mL 135   Troponin T, High Sensitivity <=22 ng/L 57 (H)   WBC 4.0 - 11.0 10e3/uL 7.7   Hemoglobin 13.3 - 17.7 g/dL 15.6   Hematocrit 40.0 - 53.0 % 44.7   Platelet Count 150 - 450 10e3/uL 198   RBC Count 4.40 - 5.90 10e6/uL 4.85   MCV 78 - 100 fL 92   MCH 26.5 - 33.0 pg 32.2   MCHC 31.5 - 36.5 g/dL 34.9   RDW 10.0 - 15.0 % 13.2   (L): Data is abnormally " low  (H): Data is abnormally high      CMR 7/18/22: personally reviewed   Clinical history: 66 M myotonic dystrophy type 2, LVEF 45-50% on echo, 2.3% PVC burden on monitor  Comparison CMR: none     1. The LV is normal in cavity size and wall thickness. The global systolic function is mildly reduced. The  LVEF is 46%. There are no regional wall motion abnormalities.     2. The RV is normal in cavity size. The global systolic function is mildly reduced. The RVEF is 47%.      3. Both atria are severely enlarged.     4. There is no significant valvular disease.      5. Late gadolinium enhancement imaging shows mid-myocardial enhancement in the septum. This is often seen  in non-ischemic DCM, and is not typical for dystrophin-related CM (typically involves posterior wall).  There is no myocardial edema.      6. Regadenoson stress perfusion imaging shows no ischemia.     7. There is no pericardial effusion or thickening.     8. There is no intracardiac thrombus.     CONCLUSIONS: Mild NICM, LVEF 46% and RVEF 47%. No myocardial ischemia, with mild non-ischemic fibrosis in  the septum.     ziopatch monitor demonstrated sinus with 2.3% PVCs, no VT, and brief AV Wenckebach      ECG 7/18/22: personally reviewed: sinus with PVC, bifascular block, QRS duration 146ms.     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 He has a history of atrial flutter and a mildly reduced LVEF and progressive bifasicular block.      1. 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 Mild non ischemic, biventricular systolic heart failure  3. History of presyncope  3. History of atrial flutter  4. Bifascicular block   5. Wenckebach   6. Hypertension history     His CMR shows mild non ischemic biventricular cardiomyopathy, which may be related to myotonic dystrophy type 2, as  the scar pattern is variable in myotonic dystrophy type 2. He is tolerating losartan 12.5mg daily and has normal renal function (Cr low due to decreased muscle mass) and potassium. We can also consider addition of low dose spironolactone.  The combination of ARB and MRA may be beneficial in muscular dystrophy patients to slow the progression of scar tissue.     He has seen Dr. Hadley for bifascicular block. Since his last visit, he has had prolongation of both his KY interval and QRS duration, which reflects progressive conduction system disease. Mr. Lara is agreeable to proceed with CRT and I will discuss this with Dr. Hadley. We will plan to obtain a CMR prior to CRT.     PLAN:     PLAN:   -follow up with Dr. Hadley  -CMR   -follow up in clinic in 6 months or sooner if needed and then annually which can be coordinated with the visit with Dr. Cano       600 minutes on DOS for chart review, visit,  counseling, review of diagnostic testing, coordination of care (communication with Dr. Hadley) and documentation.     Thank you for allowing me to participate in the care of your patient. Please do not hesitate to contact me if you have any questions.     Sincerely,   Forum     Forum MD Daisha, PhD, [facility]  Advanced Heart Failure/Transplantation/MCS  [facility]/INXPO

## 2023-01-23 NOTE — LETTER
2023       RE: Rajni Lara  [address]  [facility] [address]     Dear Colleague,    Thank you for referring your patient, Rajni Lara, to the [facility] [place] [facility]. Please see a copy of my visit note below.    Service Date: 2023    Mark Ashton MD  [facility]  [address] [place]  [address]    RE:  Rajni Lara  MRN:  [id number]  :  1955    Dear Forum:    I had the pleasure to see Mr. Lara in followup at the [facility].  He has genetically confirmed type 2 myotonic dystrophy.  I first saw him a year ago. Compared to last year, his proximal leg weakness is about the same.  He has some difficulty getting up from low-seated chairs or ascending stairs but it has not changed a lot.  He has not had any falls.  He does not use any cane or walker.  He does not have any worsening hand clumsiness or weakness from last year and he denies significant myotonia. He has occasional dysphagia.  He does not choke, but sometimes food feels like getting stuck.  He had syncope during swallowing in the past, but this fortunately has not happened in the last 12 months.  The episodes of food getting stuck occur about twice a month at most.  He did see Speech Therapy about this a year ago and got appropriate advice.  He denies sialorrhea, dysphonia or dysarthria.      He does not have any cardiac complaints.  He will see Cardiology (Dr Ashton) today.  He was seeing Dr. Hadley recently and he has bifascicular block on his EKG, but no recent syncopal episodes of chest pain are reported.  He did have labs today, which are pending including CBC, CMP, CK, troponin, and BNP.  He had a hemoglobin A1c in 2022 that was normal at 4.9%.  Lipid panel at the same time showed mildly elevated triglycerides at 158.  Otherwise, normal. Basic metabolic panel  "done on the same day, 09/21/2022, was basically unremarkable.      He does snore at night at times, similar to last year.  He has mild excessive daytime sleepiness.  I had asked for a sleep study to be done locally last year, but he did not do it for unclear reasons.  He does not have any dyspnea on exertion or significant orthopnea but had abnormal PFTs a year ago with FVC 72% and MIP -40.      He has bilateral hand essential tremor.  It is not very bothersome.  It does not significantly limit activities of daily living.  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  He does not think he needs any treatment for it.  Also, last year I ordered a cervical spine MRI due to his hyperreflexia in the upper and lower extremities with lack of jaw jerk and loss of vibration of the toes.  I wanted to rule out cervical myelopathy.  This was never scheduled, again for unclear reasons.    /77   Pulse 50   Ht 1.905 m (6' 3\")   Wt 101.2 kg (223 lb)   SpO2 99%   BMI 27.87 kg/m      I did a limited exam today. He still has no vibration at the right great toe and, at most, 2 seconds on the left.  He has brisk ankle and knee reflexes 3+ with spread and brisk upper extremity reflexes with spread.  He has bilateral Karen sign. He does not have a jaw jerk.  His toes are equivocal due to being very ticklish, but his foot tapping agility is good.    In summary, Mr. Lara has myotonic dystrophy type 2, with no significant progression of his muscle weakness from last year. There is no disease modifying FDA approved treatment, or clinical trial for myotonic type 2, currently available at the [facility]. We discussed a number of practical issues.  He will see our physical therapist today.  He does not have any worsening dysphagia, but if he does, he can work with his local speech therapist.  I will repeat his PFTs and I reordered the " sleep study; it is the patient's responsibility to schedule it locally.  I will also reorder a cervical spine MRI to rule out myelopathy, because the clinical findings remain similar to last year, and I still have a low to modest index of suspicion of this diagnosis.      He will see Dr. Ashton from Cardiology today; no new cardiac complaints reported to me today.  He has an annual Ophthalmology appointment; he will be seen again in 2023.  He is status post bilateral cataract removal.  He has no major GI complaints.      Follow up in 1 year or sooner if needed. TT spent on this encounter today 25 minutes, of which 10 face to face, 10 in post visit note dictation, editing, and orders, and 5 in pre-visit chart review.     Sincerely,    Reinaldo Rodriguez MD        D: 2023   T: 2023   MT: ravi    Name:     DERIAN MORALES  MRN:      [id number]        Account:      [id number]   :      1955           Service Date: 2023     Document: [id number]

## 2023-01-23 NOTE — NURSING NOTE
Chief Complaint   Patient presents with     Follow Up     Return Muscular Dystrophy- Return muscular dystrophy, 67 yo male with myotonic dystrophy type 2 presents for follow up with labs      Vitals were taken and medications reconciled.    Amilcar Briggs, EMT  2:30 PM

## 2023-01-23 NOTE — PATIENT INSTRUCTIONS
"You were seen today in the Cardiovascular Clinic at the [facility].      Cardiology Providers you saw during your visit:  Dr. Mark Ashton     Recommendations:   EKG today in clinic.  Follow up with Dr. Hadley as scheduled.  Let us know if you need a letter regarding the [employer].  Follow up with Dr. Ashton in 6 months with labs prior.  Follow up with Dr. Ashton annually along with the with Dr. Ruth appointment.       Thank you for your visit today!   Please MyChart message or call if you have any questions or concerns.      During Business Hours:  [phone], option # 1 \"To leave a message for your care team\"     After hours, weekends or holidays:   [phone], Option #4  Ask to speak to the On-Call Cardiologist. Inform them you are a heart failure patient at the [facility].      Joan Tolbert RN BSN   Cardiology Nurse Coordinator - Heart Failure/[facility]  [phone] option 1 to schedule an appointment or leave a message for your care team      "

## 2023-01-23 NOTE — PROGRESS NOTES
Neurocardiomyopathy Clinic Progress Note    Name: Rajni Lara  : 1955  MRN: [id number]    2023    Dear Dr. Rodriguez and Dr. Santiago,     I had the pleasure of seeing Mr. Rajni Lara, a 67 year old man today in the [facility] for a follow up of myotonic dystrophy type 2 associated cardiomyopathy. He is accompanied by his wife today.     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 He also has a family history of myotonic dystrophy in his sister and also probably his mother and maternal grandfather. He saw Dr. Cano in [facility] on 1/10/22. He had an echocardiogram in  and was incidentally idenitied to have atrial flutter which showed an LVEF of 40-45%. In  he had a successful cardioversion for atrial flutter. He had a echo in 2009 which showed an LVEF of 50-55% and normal RV function. He had a nuclear stress test in  which showed thinning at the inferior base and no ischemia. He has not had cardiovascular follow up since then.     I last saw him on 2022. He had a cardiac MRI which showed an LVEF of 46%, RVEF 47%, JEFFERSON, and mid myocardial enhancement in the septum, and no ischemia. His ziopatch monitor demonstrated sinus with 2.3% PVCs, no VT, and brief AV Wenckebach. He saw EP regarding bifasicular block and he will follow up with Dr. Hadley in 4-6 months regarding CRT-P.  He has been biking every other day for 40-50 minutes with 5-6 resistance (0-8 range) and bikes around 11 miles. This is an increase from 2-3 times per week previously. He continues to have difficulties going up the stairs. He has no dyspnea or chest pain with activity or rest. His weight is stable and he does not have edema, orthopnea, or PND. He has nocturia once a night. He has no palpitations, dizziness, lightheadedness, or pre/syncope. No change in snoring. His energy levels are ok  and largely unchanged. His appetite is good.     His son and younger daughter tested negative for myotonic dystrophy type 2, his older daughter who lives in [place] tested positive. She has not been able to have cardiovascular screening in [place] thus far.     He is planning to go to the [employer] in April, but he is not sure how he will be able to manage walking on elevated aspects of the course.     REVIEW OF SYSTEMS: 10 point ROS neg other than the symptoms noted above in the HPI.    PAST MEDICAL HISTORY:   1. Myotonic dystrophy type 2  2. History of atrial flutter s/p DCCV   3. History of non ischemic cardiomyopathy   4. BPH      ALLERGIES:    Allergies   Allergen Reactions     Sulfa Drugs Dermatitis, Headache and Photosensitivity       MEDICATIONS:   acetaminophen (TYLENOL) 500 MG tablet, Take 1,000 mg by mouth  aspirin (ASA) 81 MG chewable tablet, Take 81 mg by mouth daily (Patient not taking: Reported on 2022)  finasteride (PROSCAR) 5 MG tablet, Take 1 tablet by mouth every other day  losartan (COZAAR) 25 MG tablet, Take 0.5 tablets (12.5 mg) by mouth daily  nystatin-triamcinolone (MYCOLOG) 395163-5.1 UNIT/GM-% external ointment, Apply topically 2 times daily  tamsulosin (FLOMAX) 0.4 MG capsule, TAKE ONE CAPSULE BY MOUTH AT BEDTIME    No current facility-administered medications on file prior to visit.    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   Tobacco: never  Alcohol: rarely   Illicits: now  His family is from [facility]       FAMILY HISTORY:  His family is from [facility]. He has multiple maternal family members with myotonic dystrophy.   Mom when 70 when falling and missing steps and in her 80s she was bedbound.   Sister is a physician and has MD2  Maternal grand father had a heart attack 59, and  and also had some leg weakness  Maternal uncle sudden death at age 64  Son and younger daughter tested negative.  "Older daughter is positive for MD2      PHYSICAL EXAM:   /74 (BP Location: Right arm, Patient Position: Chair, Cuff Size: Adult Regular)   Pulse 55   Ht 1.923 m (6' 3.71\")   Wt 101.2 kg (223 lb 3.2 oz)   SpO2 100%   BMI 27.38 kg/m    General: comfortable, conversant, NAD  HEENT: normocephalic, atraumatic, anicteric,   Neck: Estimate JVP <7  CV: RRR, nl s1 and s2, no murmurs, gallops, or rubs   Lungs: CTAB, no crackles or wheezes, normal work of breathing  Abdomen: BS+, soft, non tender, non distended  Extremities: warm and well perfused, no edema      DIAGNOSTIC TESTING:      Latest Reference Range & Units 01/23/23 12:55   Sodium 136 - 145 mmol/L 141   Potassium 3.4 - 5.3 mmol/L 5.1   Chloride 98 - 107 mmol/L 105   Carbon Dioxide (CO2) 22 - 29 mmol/L 29   Urea Nitrogen 8.0 - 23.0 mg/dL 15.7   Creatinine 0.67 - 1.17 mg/dL 0.51 (L)   GFR Estimate >60 mL/min/1.73m2 >90   Calcium 8.8 - 10.2 mg/dL 9.6   Anion Gap 7 - 15 mmol/L 7   Magnesium 1.7 - 2.3 mg/dL 2.3   Albumin 3.5 - 5.2 g/dL 4.3   Protein Total 6.4 - 8.3 g/dL 6.9   Alkaline Phosphatase 40 - 129 U/L 88   ALT 10 - 50 U/L 36   AST 10 - 50 U/L 46   Bilirubin Total <=1.2 mg/dL 0.6   CK Total 39 - 308 U/L 681 (H)   Glucose 70 - 99 mg/dL 70   N-Terminal Pro Bnp 0 - 900 pg/mL 135   Troponin T, High Sensitivity <=22 ng/L 57 (H)   WBC 4.0 - 11.0 10e3/uL 7.7   Hemoglobin 13.3 - 17.7 g/dL 15.6   Hematocrit 40.0 - 53.0 % 44.7   Platelet Count 150 - 450 10e3/uL 198   RBC Count 4.40 - 5.90 10e6/uL 4.85   MCV 78 - 100 fL 92   MCH 26.5 - 33.0 pg 32.2   MCHC 31.5 - 36.5 g/dL 34.9   RDW 10.0 - 15.0 % 13.2   (L): Data is abnormally low  (H): Data is abnormally high      CMR 7/18/22: personally reviewed   Clinical history: 66 M myotonic dystrophy type 2, LVEF 45-50% on echo, 2.3% PVC burden on monitor  Comparison CMR: none     1. The LV is normal in cavity size and wall thickness. The global systolic function is mildly reduced. The  LVEF is 46%. There are no regional " wall motion abnormalities.     2. The RV is normal in cavity size. The global systolic function is mildly reduced. The RVEF is 47%.      3. Both atria are severely enlarged.     4. There is no significant valvular disease.      5. Late gadolinium enhancement imaging shows mid-myocardial enhancement in the septum. This is often seen  in non-ischemic DCM, and is not typical for dystrophin-related CM (typically involves posterior wall).  There is no myocardial edema.      6. Regadenoson stress perfusion imaging shows no ischemia.     7. There is no pericardial effusion or thickening.     8. There is no intracardiac thrombus.     CONCLUSIONS: Mild NICM, LVEF 46% and RVEF 47%. No myocardial ischemia, with mild non-ischemic fibrosis in  the septum.     ziopatch monitor demonstrated sinus with 2.3% PVCs, no VT, and brief AV Wenckebach      ECG 7/18/22: personally reviewed: sinus with PVC, bifascular block, QRS duration 146ms.     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 He has a history of atrial flutter and a mildly reduced LVEF and progressive bifasicular block.      1. 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 Mild non ischemic, biventricular systolic heart failure  3. History of presyncope  3. History of atrial flutter  4. Bifascicular block   5. Wenckebach   6. Hypertension history     His CMR shows mild non ischemic biventricular cardiomyopathy, which may be related to myotonic dystrophy type 2, as the scar pattern is variable in myotonic dystrophy type 2. He is tolerating losartan 12.5mg daily and has normal renal function (Cr low due to decreased muscle mass) and potassium. We can also consider addition of low dose spironolactone.  The combination of ARB and MRA may be beneficial in muscular dystrophy patients to slow the  progression of scar tissue.     He has seen Dr. Hadley for bifascicular block. Since his last visit, he has had prolongation of both his CT interval and QRS duration, which reflects progressive conduction system disease. Mr. Lara is agreeable to proceed with CRT and I will discuss this with Dr. Hadley. We will plan to obtain a CMR prior to CRT.     PLAN:     PLAN:   -follow up with Dr. Hadley  -CMR   -follow up in clinic in 6 months or sooner if needed and then annually which can be coordinated with the visit with Dr. Cano       600 minutes on DOS for chart review, visit,  counseling, review of diagnostic testing, coordination of care (communication with Dr. Hadley) and documentation.     Thank you for allowing me to participate in the care of your patient. Please do not hesitate to contact me if you have any questions.     Sincerely,   [place]     Mark Ashton MD, PhD, FACC  Advanced Heart Failure/Transplantation/MCS  [facility]

## 2023-01-23 NOTE — NURSING NOTE
Labs: Patient was given results of the laboratory testing obtained today. Patient demonstrated understanding of this information and agreed to call with further questions or concerns.     Med Reconcile: Reviewed and verified all current medications with the patient. The updated medication list was printed and given to the patient.    Return Appointment: Patient given instructions regarding scheduling next clinic visit. Patient demonstrated understanding of this information and agreed to call with further questions or concerns. 6 months and then annual with Dr. Cano, annual visit with CMR (unless needed prior to CRT-D implant).     Patient stated he understood all health information given and agreed to call with further questions or concerns.    Joan Tolbert RN

## 2023-01-25 ENCOUNTER — CARE COORDINATION (OUTPATIENT)
Dept: CARDIOLOGY | Facility: CLINIC | Age: 68
End: 2023-01-25
Payer: COMMERCIAL

## 2023-01-25 DIAGNOSIS — G71.11 MYOTONIC DYSTROPHY, TYPE 2 (H): Primary | ICD-10-CM

## 2023-01-25 DIAGNOSIS — I42.8 NONISCHEMIC CARDIOMYOPATHY (H): ICD-10-CM

## 2023-01-25 NOTE — LETTER
March 1, 2023      TO: Rajni Lara  [address]  [place] [address]         Dear Rajni,    You are scheduled for an implant of a Cardiac Resynchronization Therapy Pacemaker (CRT-P).    Visitor Policy: Two visitors.    Below are instructions, if you have questions please contact our office.     1. You should arrive at the [facility]   ([address], [place]) to the [facility] Waiting Room at ____6am_______ on   _May 15, 2023__.  2. Do not eat for 8 hours prior to arrival, you can drink water up until 2 hours prior.  3. The morning of your procedure, you may take your scheduled medications with a sip of water.  4. You will likely discharge the same day and need a .  5. Use the antibacterial wipes the night before and morning of your procedure. Follow the included instructions.     Post-Procedure Instructions  Wound Care  1. If no Dermabond has been applied to your incision: Keep your incision (surgery wound) dry for 3 days.  After 3 days, you may remove the outer bandage.  Keep the strips of tape on.  They will be removed at your clinic visit.  a. If Dermabond has been applied to your incision,  do not apply powder or lotion to the incision for 14 days. You may shower in the morning.  2. Check for signs of infection each day.  These include increased redness, swelling, drainage or a fever over 101 F (38.3 C).  Call us immediately if you see any of   these signs.  3. If there are no signs of infection, you may shower in 3 days.  Do not submerge the incision (in a bath tub, hot tub, or swimming pool) until fully healed.  Pain  1. You may have mild to moderate pain for 3 to 5 days.  Take acetaminophen (Tylenol) or ibuprofen (Advil) for the pain.  Call us if the pain is severe or lasts more than 5 days.  Activity  1. You should slowly go back to your normal activities after 24 hours.  Healing will take 4 to 6 weeks.  2. No driving for 3 days  3. Avoid climbing a ladder alone.  It is best  "to stay within 4 feet of the ground.  4. Avoid anything that may cause rough contact or a hard hit to your chest.  This includes football, hockey, and other contact sports.  5. Do not go swimming or boating alone.    6. FOR ATLEAST 4 WEEKS:  a. Do not raise your affected arm above your shoulder.  b. Do not use your affected arm to push, pull, or lift anything over 10 pounds.  c. Avoid repetitive upper body activities for 6 weeks (ie: golf, swimming, and weight lifting)  Follow Up Appointments Date & Time   7-10 day incision check with device clinic To be done locally   3 month follow up visit with ECHO, EKG, device clinic, & provider Aug 16 [address] [facility] 3rd Floor      1p - echo      2p - Device    2:30p - Dr. Hadley     If you have further questions, please utilize [employer] to contact us.   If your question concerns the above instructions, contact:  Carolina Nettles RN   Electrophysiology Nurse Coordinator.  [phone]    If your question concerns the schedule/appointment times, contact:  [facility]   [phone]  Preparing the Skin Before Surgery  Preparing or \"prepping\" skin before surgery can reduce the risk of infection at the surgical site. To make the process easier, this facility has chosen disposable cloths moistened with rinse-free 2% Chlorhexidine Gluconate antiseptic solution designed to reduce the bacteria on the skin. The steps below outline the prepping process and should be carefully followed.    Prep the skin at the following time(s):    - If you wish to shower, bathe or shampoo your hair, do so the night before and prep your skin afterwards for the first time using one package of cloths.    - Skin must be prepped the morning of the procedure using the second package of cloths.        Prep The Night Before Procedure    Do not allow this product to come in contact with your eyes, ears, mouth or mucous membranes.     Reach into one of the packages, remove the two cloths " and place onto a clean table.    Use one clean cloth to prep each are of the body. One cloth for #1 - neck & chest. One cloth for #2 & #3 - both arms, shoulder to fingertips including armpits. Wipe each area in a back and forth motion for 3 minutes. Be sure to wipe each area thoroughly.    Do not rinse or apply any lotions, moisturizer or make up after prepping.    Discard cloths in trash can.     Allow your skin to air dry. Dress in clean clothes/sleepwear      Prepping your skin on the morning of surgery:    Do not shower, bathe or shampoo hair.    Open a new package and follow the instructions listed above.

## 2023-01-26 LAB
ATRIAL RATE - MUSE: 53 BPM
DIASTOLIC BLOOD PRESSURE - MUSE: NORMAL MMHG
INTERPRETATION ECG - MUSE: NORMAL
P AXIS - MUSE: 72 DEGREES
PR INTERVAL - MUSE: 230 MS
QRS DURATION - MUSE: 156 MS
QT - MUSE: 518 MS
QTC - MUSE: 486 MS
R AXIS - MUSE: -61 DEGREES
SYSTOLIC BLOOD PRESSURE - MUSE: NORMAL MMHG
T AXIS - MUSE: 65 DEGREES
VENTRICULAR RATE- MUSE: 53 BPM

## 2023-01-30 ENCOUNTER — TELEPHONE (OUTPATIENT)
Dept: CARDIOLOGY | Facility: CLINIC | Age: 68
End: 2023-01-30

## 2023-01-30 RX ORDER — CEFAZOLIN SODIUM 2 G/50ML
2 SOLUTION INTRAVENOUS
Status: CANCELLED | OUTPATIENT
Start: 2023-01-30

## 2023-01-30 RX ORDER — LIDOCAINE 40 MG/G
CREAM TOPICAL
Status: CANCELLED | OUTPATIENT
Start: 2023-01-30

## 2023-01-30 RX ORDER — SODIUM CHLORIDE 9 MG/ML
INJECTION, SOLUTION INTRAVENOUS CONTINUOUS
Status: CANCELLED | OUTPATIENT
Start: 2023-01-30

## 2023-01-30 NOTE — TELEPHONE ENCOUNTER
EP Scheduling called the patient to schedule Cardiac MRI and then an ICD Implant with Dr. Hadley. The number [phone] was left for the patient to return the call and schedule the procedure.    Eloise Renae  Periop Electrophysiology   [phone]

## 2023-01-30 NOTE — PROGRESS NOTES
Orders placed, letter started. Routing to EP .     Message  Received: 6 days ago  Mark Ashton MD Roukoz, Henri, MD  Cc: Carolina Nettles, ROXANA; Joan Tolbert, ROXANA Eason,     I saw Mr. Lara in clinic yesterday who has myotonic dystrophy and bifascicular block. His QRS duration has  now progressed to 156ms. He is agreeable to proceeding with proceeding with CRT-P.     We can get a CMR with contrast prior to CRT to confirm his LVEF.     Thanks,   Mark

## 2023-02-07 LAB
EXPTIME-PRE: 6.72 SEC
FEF2575-%PRED-PRE: 89 %
FEF2575-PRE: 2.49 L/SEC
FEF2575-PRED: 2.77 L/SEC
FEFMAX-%PRED-PRE: 91 %
FEFMAX-PRE: 9.02 L/SEC
FEFMAX-PRED: 9.81 L/SEC
FEV1-%PRED-PRE: 77 %
FEV1-PRE: 2.81 L
FEV1FEV6-PRE: 78 %
FEV1FEV6-PRED: 78 %
FEV1FVC-PRE: 78 %
FEV1FVC-PRED: 76 %
FIFMAX-PRE: 5.95 L/SEC
FVC-%PRED-PRE: 75 %
FVC-PRE: 3.59 L
FVC-PRED: 4.75 L
MEP-PRE: 64 CMH2O
MIP-PRE: -30 CMH2O

## 2023-02-20 ENCOUNTER — HOSPITAL ENCOUNTER (OUTPATIENT)
Dept: MRI IMAGING | Facility: CLINIC | Age: 68
Discharge: HOME | End: 2023-02-20
Attending: INTERNAL MEDICINE | Admitting: INTERNAL MEDICINE
Payer: COMMERCIAL

## 2023-02-20 DIAGNOSIS — I42.8 NONISCHEMIC CARDIOMYOPATHY (H): ICD-10-CM

## 2023-02-20 DIAGNOSIS — G71.11 MYOTONIC DYSTROPHY, TYPE 2 (H): ICD-10-CM

## 2023-02-20 PROCEDURE — 75561 CARDIAC MRI FOR MORPH W/DYE: CPT | Mod: 26 | Performed by: RADIOLOGY

## 2023-02-20 PROCEDURE — A9585 GADOBUTROL INJECTION: HCPCS | Performed by: INTERNAL MEDICINE

## 2023-02-20 PROCEDURE — 255N000002 HC RX 255 OP 636: Performed by: INTERNAL MEDICINE

## 2023-02-20 PROCEDURE — 75561 CARDIAC MRI FOR MORPH W/DYE: CPT

## 2023-02-20 RX ORDER — GADOBUTROL 604.72 MG/ML
12 INJECTION INTRAVENOUS ONCE
Status: COMPLETED | OUTPATIENT
Start: 2023-02-20 | End: 2023-02-20

## 2023-02-20 RX ADMIN — GADOBUTROL 12 ML: 604.72 INJECTION INTRAVENOUS at 11:39

## 2023-03-01 ENCOUNTER — VIRTUAL VISIT (OUTPATIENT)
Dept: CARDIOLOGY | Facility: CLINIC | Age: 68
End: 2023-03-01
Attending: INTERNAL MEDICINE
Payer: COMMERCIAL

## 2023-03-01 DIAGNOSIS — I45.2 BIFASCICULAR BLOCK: ICD-10-CM

## 2023-03-01 DIAGNOSIS — Z95.0 CARDIAC RESYNCHRONIZATION THERAPY PACEMAKER (CRT-P) IN PLACE: Primary | ICD-10-CM

## 2023-03-01 DIAGNOSIS — I42.8 NONISCHEMIC CARDIOMYOPATHY (H): ICD-10-CM

## 2023-03-01 DIAGNOSIS — G71.11 MYOTONIC DYSTROPHY, TYPE 2 (H): ICD-10-CM

## 2023-03-01 PROCEDURE — 99215 OFFICE O/P EST HI 40 MIN: CPT | Mod: 95 | Performed by: INTERNAL MEDICINE

## 2023-03-01 PROCEDURE — G0463 HOSPITAL OUTPT CLINIC VISIT: HCPCS | Mod: GT,PN | Performed by: INTERNAL MEDICINE

## 2023-03-01 NOTE — PATIENT INSTRUCTIONS
Plan:    CRTP implant. You will be contacted with instructions and to schedule.       Your Care Team:  EP Cardiology   Telephone Number     Carolina Nettles RN [phone]    After business hours: [phone], ask for cardiologist on-call   Non-procedure scheduling:    Lazara SWANSON   [phone]   Procedure scheduling:    Eloise Renae   [phone]   Device Clinic (Pacemakers, ICDs, Loop Recorders)    During business hours: [phone]  After business hours:   [phone]- select option 4 and ask for job code [id number].       Cardiovascular Clinic:   [address] [place] [address]. [place] [address]      As always, thank you for trusting us with your health care needs!

## 2023-03-01 NOTE — PROGRESS NOTES
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     Myotonic Dystrophy Type II NICM LVEF 46%, NYHA II:  1. ACEi/ARB: Continue Losartan.  2. BB: Not currently on due to bradycardia and bifascicular block on ECG   3. Aldosterone antagonist: Not currently required.  4. SCD prophylaxis:I discussed with the patient and referring physician the indications for prophylactic pacing. If a pacer is needed, given anticipation of higher V pacing percentages and LVEF ~40-45%, would favor CRTP. There is a class IIa indication for pacing if IL>240 ms and QRS >140 ms, or if HV >70 ms on invasive testing. The patient is under these limits currently, however, the increase in the last few months warrants close follow-up. If the patient has any new symptoms or EKG findings crossing these limits, we would recommend implanting a prophylactic pacer, particularly a CRT-P given LV dysfunction. We reviewed his most recent CMR with a second reader who reports LVEF is similar to prior around 45%.   We discussed that some of his syncope/dizzienss may be swallow syncope related while some have more concerning features and could be sonal/conduction driven, but that pacing may not address/resolve all his dizziness symptoms.   5. Fluid status: appears euvolemic on exam, not requiring diuretics.      Follow-up 3-4 months after device implant with echo.        History of Present Illness/Subjective    Mr. Rajni Lara is a 67 year old male who comes in today for EP consultation of conduction delays in MD patient.     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     He has known  gene positive myotonic dystrophy type 2. In 2008, he had an echo which showed LVEF 40-45% and he was found to have AFL. He had a DCCVs in 2009. No recurrences since. A follow-up echo showed LVEF 50-55% in sinus. NM stress test in 2009 which showed thinning at the inferior base and no ischemia.He was lost to Cardiology follow-up until he res established with Dr. Ashton. A CMR from 7/2022 showed LVEF 46%, JEFFERSON, and LGE mid myocardial enhancement in the septum, and no ischemia. His ziopatch monitor from, 4/25/22-5/1/22 demonstrated sinus with 2.3% PVCs, no VT, and brief AV Wenckebach at 92bpm with sinus slowing prior. ECG shows bifascicular block and he was referred to EP for conduction delays in a myotonic dystrophy patient.     EP Visit 9/21/22: He reports feeling at baseline. He had an episode of syncope in 12/2020, after standing for a couple of minutes, he passed out while standing. He had some SOB after eating and drinking soda and has had some dizzy spells since. ECG has evolved from RBBB to bifascicular block from April to July, with CO prolonging from 200 to 224 ms range, along with widening of the QRS from around 120 ms when measured manually to >140 ms.. He denies chest discomfort, palpitations, abdominal fullness/bloating or peripheral edema, shortness of breath, paroxysmal nocturnal dyspnea, orthopnea, lightheadedness, dizziness, pre-syncope, or syncope. Presenting 12 lead ECG shows SB 1 AVB bifasicular block Vent Rate 55 bpm,  ms,  ms, QTc 476 ms. Current cardiac medications include: Losartan and ASA.     He presents today for follow up. He was agreeable to proceeding with CRTP, but wanted clinic appointment to discuss again first. QRS duration has progressed last 156 ms.  He reports feeling more tired over last couple months. He denies chest discomfort, palpitations, abdominal fullness/bloating or peripheral edema, shortness of breath, paroxysmal nocturnal dyspnea, orthopnea, lightheadedness,  dizziness, pre-syncope, or syncope. A recent CMR showed low normal biventricular function. LV EF 52%. RVEF 51% minimal improvement from prior study (reviewed again and EF the same in the 45% range) . No significant valvular disease. Mild non-ischemic fibrosis in the septum unchanged from prior study. 12 lead ECG from 2023 shows SR   ms,  (increased prior to last visit) ms. Current cardiac medications include: Losartan and ASA..     Family History:   He was diagnosed with MD earlier this year after his sister was diagnosed with it. His mother was bedridden for the last 7-8 years of her life,  at 87, and had very easy falls before that  Kids 2 tested negative and one tested positive  Brother tested negative  He has signs of wekaness, tough time going up steps and getting up chairs      I have reviewed and updated the patient's Past Medical History, Social History, Family History and Medication List.     Cardiographics (Personally Reviewed) :   22 Echo:   Interpretation Summary  Left ventricular function is decreased. The ejection fraction is 45-50% (mildly reduced).  The right ventricle is normal size.  Global right ventricular function is normal.  No significant valvular abnormalities were noted.  The inferior vena cava was normal in size with preserved respiratory variability.  No pericardial effusion is present.    22 CMR:  1. The LV is normal in cavity size and wall thickness. The global systolic function is mildly reduced. The LVEF is 46%. There are no regional wall motion abnormalities.  2. The RV is normal in cavity size. The global systolic function is mildly reduced. The RVEF is 47%.   3. Both atria are severely enlarged.  4. There is no significant valvular disease.   5. Late gadolinium enhancement imaging shows mid-myocardial enhancement in the septum. This is often seen in non-ischemic DCM, and is not typical for dystrophin-related CM (typically involves posterior wall). There  is no myocardial edema.   6. Regadenoson stress perfusion imaging shows no ischemia.  7. There is no pericardial effusion or thickening.  8. There is no intracardiac thrombus.  CONCLUSIONS: Mild NICM, LVEF 46% and RVEF 47%. No myocardial ischemia, with mild non-ischemic fibrosis in  the septum.     2/20/23 CMR:  1. The LV is normal in cavity size and wall thickness. The global systolic function is low normal. The LVEF  is 52%. There are no regional wall motion abnormalities.  2. The RV is normal in cavity size. The global systolic function is low normal. The RVEF is 51%.   3. Mild right atrial enlargement. Left atria is normal in size.  4. There is no significant valvular disease.   5. Late gadolinium enhancement imaging shows small area of mid myocardial enhancement of the basal to mid  septum.   6. There is no pericardial effusion or thickening.  CONCLUSIONS: Low normal biventricular function. LV EF 52%. RVEF 51% minimal improvement from prior study.  No significant valvular disease. Mild non-ischemic fibrosis in the septum unchanged from prior study.           Physical Examination   There were no vitals taken for this visit.  Wt Readings from Last 3 Encounters:   01/23/23 101.2 kg (223 lb 3.2 oz)   01/23/23 101.2 kg (223 lb)   09/21/22 98.4 kg (216 lb 14.4 oz)   There were no vitals taken for this visit.    The rest of a comprehensive physical examination is deferred due to nature of video visit restrictions.  CONSITUTIONAL: no acute distress  HEENT: no icterus, no redness or discharge, neck supple  CV: no visible edema of visualized extremities. No JVD.   RESPIRATORY: respirations nonlabored, no cough  NEURO: AA&Ox3, speech fluent/appropriate, motor grossly nonfocal  PSYCH: cooperative, affect appropriate  DERM: no rashes on visualized face/neck/upper extremities         Medications  Allergies   Current Outpatient Medications   Medication Sig Dispense Refill     acetaminophen (TYLENOL) 500 MG tablet Take 1,000  mg by mouth       finasteride (PROSCAR) 5 MG tablet Take 1 tablet by mouth every morning       finasteride (PROSCAR) 5 MG tablet Take 1 tablet by mouth every other day (Patient not taking: Reported on 1/23/2023)       losartan (COZAAR) 25 MG tablet Take 12.5 mg by mouth 0.5 tab in evening (Patient not taking: Reported on 1/23/2023)       losartan (COZAAR) 25 MG tablet Take 0.5 tablets (12.5 mg) by mouth daily 45 tablet 3     nystatin-triamcinolone (MYCOLOG) 804356-0.1 UNIT/GM-% external ointment Apply topically 2 times daily       tamsulosin (FLOMAX) 0.4 MG capsule TAKE ONE CAPSULE BY MOUTH AT BEDTIME      Allergies   Allergen Reactions     Sulfa Drugs Dermatitis, Headache and Photosensitivity         Lab Results (Personally Reviewed)    Chemistry/lipid CBC Cardiac Enzymes/BNP/TSH/INR   Lab Results   Component Value Date    BUN 15.7 01/23/2023     01/23/2023    CO2 29 01/23/2023     Creatinine   Date Value Ref Range Status   01/23/2023 0.51 (L) 0.67 - 1.17 mg/dL Final       Lab Results   Component Value Date    CHOL 220 (H) 04/25/2022    HDL 63 04/25/2022     (H) 04/25/2022      Lab Results   Component Value Date    WBC 7.7 01/23/2023    HGB 15.6 01/23/2023    HCT 44.7 01/23/2023    MCV 92 01/23/2023     01/23/2023    Lab Results   Component Value Date    TSH 2.60 01/10/2022        Video-Visit Details    Type of service:  Video Visit   Video Start Time: 11:00  Video End Time:11:30    Originating Location (pt. Location): Home  Distant Location (provider location):  On-site  Platform used for Video Visit: Gómez    I have reviewed the note as documented above.  This accurately captures the substance of my virtual visit with the patient. The patient states understanding and is agreeable with the plan.   Jenaro Hadley MD [facility]  Cardiology - Electrophysiology          Total time spent on patient visit, reviewing notes, imaging, labs, orders, and completing necessary documentation: 45 minutes.   >50% of visit spent on counseling patient and/or coordination of care.

## 2023-03-01 NOTE — LETTER
3/1/2023      RE: Rajni Lara  [address]  [facility] [address]       Dear Colleague,    Thank you for the opportunity to participate in the care of your patient, Rajni Lara, at the [facility] [place] [facility]. Please see a copy of my visit note below.          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     Myotonic Dystrophy Type II NICM LVEF 46%, NYHA II:  1. ACEi/ARB: Continue Losartan.  2. BB: Not currently on due to bradycardia and bifascicular block on ECG   3. Aldosterone antagonist: Not currently required.  4. SCD prophylaxis:I discussed with the patient and referring physician the indications for prophylactic pacing. If a pacer is needed, given anticipation of higher V pacing percentages and LVEF ~40-45%, would favor CRTP. There is a class IIa indication for pacing if KS>240 ms and QRS >140 ms, or if HV >70 ms on invasive testing. The patient is under these limits currently, however, the increase in the last few months warrants close follow-up. If the patient has any new symptoms or EKG findings crossing these limits, we would recommend implanting a prophylactic pacer, particularly a CRT-P given LV dysfunction. We reviewed his most recent CMR with a second reader who reports LVEF is similar to prior around 45%.   We discussed that some of his syncope/dizzienss may be swallow syncope related while some have more concerning features and could be sonal/conduction driven, but that pacing may not address/resolve all his dizziness symptoms.   5. Fluid status: appears euvolemic on exam, not requiring diuretics.      Follow-up 3-4 months after device implant with echo.        History of Present  Illness/Subjective    Mr. Rajni Lara is a 67 year old male who comes in today for EP consultation of conduction delays in MD patient.     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     He has known gene positive myotonic dystrophy type 2. In 2008, he had an echo which showed LVEF 40-45% and he was found to have AFL. He had a DCCVs in 2009. No recurrences since. A follow-up echo showed LVEF 50-55% in sinus. NM stress test in 2009 which showed thinning at the inferior base and no ischemia.He was lost to Cardiology follow-up until he res established with Dr. Ashton. A CMR from 7/2022 showed LVEF 46%, JEFFERSON, and LGE mid myocardial enhancement in the septum, and no ischemia. His ziopatch monitor from, 4/25/22-5/1/22 demonstrated sinus with 2.3% PVCs, no VT, and brief AV Wenckebach at 92bpm with sinus slowing prior. ECG shows bifascicular block and he was referred to EP for conduction delays in a myotonic dystrophy patient.     EP Visit 9/21/22: He reports feeling at baseline. He had an episode of syncope in 12/2020, after standing for a couple of minutes, he passed out while standing. He had some SOB after eating and drinking soda and has had some dizzy spells since. ECG has evolved from RBBB to bifascicular block from April to July, with SD prolonging from 200 to 224 ms range, along with widening of the QRS from around 120 ms when measured manually to >140 ms.. He denies chest discomfort, palpitations, abdominal fullness/bloating or peripheral edema, shortness of breath, paroxysmal nocturnal dyspnea, orthopnea, lightheadedness, dizziness, pre-syncope, or syncope. Presenting 12 lead ECG shows SB 1 AVB bifasicular block Vent Rate 55 bpm,  ms,  ms, QTc 476 ms. Current cardiac medications include: Losartan and ASA.     He presents today for follow up. He was agreeable  to proceeding with CRTP, but wanted clinic appointment to discuss again first. QRS duration has progressed last 156 ms.  He reports feeling more tired over last couple months. He denies chest discomfort, palpitations, abdominal fullness/bloating or peripheral edema, shortness of breath, paroxysmal nocturnal dyspnea, orthopnea, lightheadedness, dizziness, pre-syncope, or syncope. A recent CMR showed low normal biventricular function. LV EF 52%. RVEF 51% minimal improvement from prior study (reviewed again and EF the same in the 45% range) . No significant valvular disease. Mild non-ischemic fibrosis in the septum unchanged from prior study. 12 lead ECG from 2023 shows SR   ms,  (increased prior to last visit) ms. Current cardiac medications include: Losartan and ASA..     Family History:   He was diagnosed with MD earlier this year after his sister was diagnosed with it. His mother was bedridden for the last 7-8 years of her life,  at 87, and had very easy falls before that  Kids 2 tested negative and one tested positive  Brother tested negative  He has signs of wekaness, tough time going up steps and getting up chairs      I have reviewed and updated the patient's Past Medical History, Social History, Family History and Medication List.     Cardiographics (Personally Reviewed) :   22 Echo:   Interpretation Summary  Left ventricular function is decreased. The ejection fraction is 45-50% (mildly reduced).  The right ventricle is normal size.  Global right ventricular function is normal.  No significant valvular abnormalities were noted.  The inferior vena cava was normal in size with preserved respiratory variability.  No pericardial effusion is present.    22 CMR:  1. The LV is normal in cavity size and wall thickness. The global systolic function is mildly reduced. The LVEF is 46%. There are no regional wall motion abnormalities.  2. The RV is normal in cavity size. The global systolic  function is mildly reduced. The RVEF is 47%.   3. Both atria are severely enlarged.  4. There is no significant valvular disease.   5. Late gadolinium enhancement imaging shows mid-myocardial enhancement in the septum. This is often seen in non-ischemic DCM, and is not typical for dystrophin-related CM (typically involves posterior wall). There is no myocardial edema.   6. Regadenoson stress perfusion imaging shows no ischemia.  7. There is no pericardial effusion or thickening.  8. There is no intracardiac thrombus.  CONCLUSIONS: Mild NICM, LVEF 46% and RVEF 47%. No myocardial ischemia, with mild non-ischemic fibrosis in  the septum.     2/20/23 CMR:  1. The LV is normal in cavity size and wall thickness. The global systolic function is low normal. The LVEF  is 52%. There are no regional wall motion abnormalities.  2. The RV is normal in cavity size. The global systolic function is low normal. The RVEF is 51%.   3. Mild right atrial enlargement. Left atria is normal in size.  4. There is no significant valvular disease.   5. Late gadolinium enhancement imaging shows small area of mid myocardial enhancement of the basal to mid  septum.   6. There is no pericardial effusion or thickening.  CONCLUSIONS: Low normal biventricular function. LV EF 52%. RVEF 51% minimal improvement from prior study.  No significant valvular disease. Mild non-ischemic fibrosis in the septum unchanged from prior study.           Physical Examination   There were no vitals taken for this visit.  Wt Readings from Last 3 Encounters:   01/23/23 101.2 kg (223 lb 3.2 oz)   01/23/23 101.2 kg (223 lb)   09/21/22 98.4 kg (216 lb 14.4 oz)   There were no vitals taken for this visit.    The rest of a comprehensive physical examination is deferred due to nature of video visit restrictions.  CONSITUTIONAL: no acute distress  HEENT: no icterus, no redness or discharge, neck supple  CV: no visible edema of visualized extremities. No JVD.   RESPIRATORY:  respirations nonlabored, no cough  NEURO: AA&Ox3, speech fluent/appropriate, motor grossly nonfocal  PSYCH: cooperative, affect appropriate  DERM: no rashes on visualized face/neck/upper extremities         Medications  Allergies   Current Outpatient Medications   Medication Sig Dispense Refill     acetaminophen (TYLENOL) 500 MG tablet Take 1,000 mg by mouth       finasteride (PROSCAR) 5 MG tablet Take 1 tablet by mouth every morning       finasteride (PROSCAR) 5 MG tablet Take 1 tablet by mouth every other day (Patient not taking: Reported on 1/23/2023)       losartan (COZAAR) 25 MG tablet Take 12.5 mg by mouth 0.5 tab in evening (Patient not taking: Reported on 1/23/2023)       losartan (COZAAR) 25 MG tablet Take 0.5 tablets (12.5 mg) by mouth daily 45 tablet 3     nystatin-triamcinolone (MYCOLOG) 920123-6.1 UNIT/GM-% external ointment Apply topically 2 times daily       tamsulosin (FLOMAX) 0.4 MG capsule TAKE ONE CAPSULE BY MOUTH AT BEDTIME      Allergies   Allergen Reactions     Sulfa Drugs Dermatitis, Headache and Photosensitivity         Lab Results (Personally Reviewed)    Chemistry/lipid CBC Cardiac Enzymes/BNP/TSH/INR   Lab Results   Component Value Date    BUN 15.7 01/23/2023     01/23/2023    CO2 29 01/23/2023     Creatinine   Date Value Ref Range Status   01/23/2023 0.51 (L) 0.67 - 1.17 mg/dL Final       Lab Results   Component Value Date    CHOL 220 (H) 04/25/2022    HDL 63 04/25/2022     (H) 04/25/2022      Lab Results   Component Value Date    WBC 7.7 01/23/2023    HGB 15.6 01/23/2023    HCT 44.7 01/23/2023    MCV 92 01/23/2023     01/23/2023    Lab Results   Component Value Date    TSH 2.60 01/10/2022        Video-Visit Details    Type of service:  Video Visit   Video Start Time: 11:00  Video End Time:11:30    Originating Location (pt. Location): Home  Distant Location (provider location):  On-site  Platform used for Video Visit: Gómez BATEMAN have reviewed the note as documented  above.  This accurately captures the substance of my virtual visit with the patient. The patient states understanding and is agreeable with the plan.   Jenaro Hadley MD [facility]  Cardiology - Electrophysiology          Total time spent on patient visit, reviewing notes, imaging, labs, orders, and completing necessary documentation: 45 minutes.  >50% of visit spent on counseling patient and/or coordination of care.

## 2023-03-01 NOTE — NURSING NOTE
Chief Complaint   Patient presents with     Follow Up     No other pt reported vitals to report today     Is the patient currently in the state of [place]? YES    Visit mode:VIDEO    If the visit is dropped, the patient can be reconnected by: VIDEO VISIT: Send to e-mail at: [email or web address]    Will anyone else be joining the visit? pts wife Desire      How would you like to obtain your AVS? MyChart    Are changes needed to the allergy or medication list? YES: duplicates flagged for removal     VITALIY Fowler/LORI

## 2023-05-01 ENCOUNTER — APPOINTMENT (OUTPATIENT)
Dept: MEDSURG UNIT | Facility: CLINIC | Age: 68
End: 2023-05-01
Attending: INTERNAL MEDICINE
Payer: COMMERCIAL

## 2023-05-01 ENCOUNTER — LAB (OUTPATIENT)
Dept: LAB | Facility: CLINIC | Age: 68
End: 2023-05-01
Attending: INTERNAL MEDICINE
Payer: COMMERCIAL

## 2023-05-01 ENCOUNTER — APPOINTMENT (OUTPATIENT)
Dept: GENERAL RADIOLOGY | Facility: CLINIC | Age: 68
End: 2023-05-01
Attending: NURSE PRACTITIONER
Payer: COMMERCIAL

## 2023-05-01 ENCOUNTER — ANCILLARY PROCEDURE (OUTPATIENT)
Dept: CARDIOLOGY | Facility: CLINIC | Age: 68
End: 2023-05-01
Attending: NURSE PRACTITIONER
Payer: COMMERCIAL

## 2023-05-01 ENCOUNTER — HOSPITAL ENCOUNTER (OUTPATIENT)
Facility: CLINIC | Age: 68
Discharge: HOME | End: 2023-05-01
Attending: INTERNAL MEDICINE | Admitting: INTERNAL MEDICINE
Payer: COMMERCIAL

## 2023-05-01 VITALS
TEMPERATURE: 98.1 F | RESPIRATION RATE: 16 BRPM | BODY MASS INDEX: 28.07 KG/M2 | DIASTOLIC BLOOD PRESSURE: 75 MMHG | HEART RATE: 58 BPM | SYSTOLIC BLOOD PRESSURE: 113 MMHG | WEIGHT: 228.84 LBS | OXYGEN SATURATION: 97 %

## 2023-05-01 DIAGNOSIS — Z95.0 S/P PLACEMENT OF CARDIAC PACEMAKER: Primary | ICD-10-CM

## 2023-05-01 DIAGNOSIS — I42.8 NONISCHEMIC CARDIOMYOPATHY (H): ICD-10-CM

## 2023-05-01 LAB
ANION GAP SERPL CALCULATED.3IONS-SCNC: 8 MMOL/L (ref 7–15)
ATRIAL RATE - MUSE: 74 BPM
BUN SERPL-MCNC: 21.4 MG/DL (ref 8–23)
CALCIUM SERPL-MCNC: 9.2 MG/DL (ref 8.8–10.2)
CHLORIDE SERPL-SCNC: 107 MMOL/L (ref 98–107)
CREAT SERPL-MCNC: 0.53 MG/DL (ref 0.67–1.17)
DEPRECATED HCO3 PLAS-SCNC: 25 MMOL/L (ref 22–29)
DIASTOLIC BLOOD PRESSURE - MUSE: NORMAL MMHG
ERYTHROCYTE [DISTWIDTH] IN BLOOD BY AUTOMATED COUNT: 13.3 % (ref 10–15)
GFR SERPL CREATININE-BSD FRML MDRD: >90 ML/MIN/1.73M2
GLUCOSE SERPL-MCNC: 107 MG/DL (ref 70–99)
HCT VFR BLD AUTO: 43.9 % (ref 40–53)
HGB BLD-MCNC: 14.7 G/DL (ref 13.3–17.7)
INTERPRETATION ECG - MUSE: NORMAL
MCH RBC QN AUTO: 31.9 PG (ref 26.5–33)
MCHC RBC AUTO-ENTMCNC: 33.5 G/DL (ref 31.5–36.5)
MCV RBC AUTO: 95 FL (ref 78–100)
P AXIS - MUSE: 82 DEGREES
PLATELET # BLD AUTO: 168 10E3/UL (ref 150–450)
POTASSIUM SERPL-SCNC: 4.6 MMOL/L (ref 3.4–5.3)
PR INTERVAL - MUSE: 236 MS
QRS DURATION - MUSE: 144 MS
QT - MUSE: 456 MS
QTC - MUSE: 506 MS
R AXIS - MUSE: -61 DEGREES
RBC # BLD AUTO: 4.61 10E6/UL (ref 4.4–5.9)
SODIUM SERPL-SCNC: 140 MMOL/L (ref 136–145)
SYSTOLIC BLOOD PRESSURE - MUSE: NORMAL MMHG
T AXIS - MUSE: 89 DEGREES
VENTRICULAR RATE- MUSE: 74 BPM
WBC # BLD AUTO: 6.4 10E3/UL (ref 4–11)

## 2023-05-01 PROCEDURE — 999N000142 HC STATISTIC PROCEDURE PREP ONLY

## 2023-05-01 PROCEDURE — 33225 L VENTRIC PACING LEAD ADD-ON: CPT | Performed by: INTERNAL MEDICINE

## 2023-05-01 PROCEDURE — 93281 PM DEVICE PROGR EVAL MULTI: CPT

## 2023-05-01 PROCEDURE — 272N000001 HC OR GENERAL SUPPLY STERILE: Performed by: INTERNAL MEDICINE

## 2023-05-01 PROCEDURE — 80048 BASIC METABOLIC PNL TOTAL CA: CPT | Performed by: INTERNAL MEDICINE

## 2023-05-01 PROCEDURE — 250N000011 HC RX IP 250 OP 636: Performed by: NURSE PRACTITIONER

## 2023-05-01 PROCEDURE — C1887 CATHETER, GUIDING: HCPCS | Performed by: INTERNAL MEDICINE

## 2023-05-01 PROCEDURE — 999N000065 XR CHEST PORT 1 VIEW

## 2023-05-01 PROCEDURE — 85027 COMPLETE CBC AUTOMATED: CPT | Performed by: INTERNAL MEDICINE

## 2023-05-01 PROCEDURE — 33208 INSRT HEART PM ATRIAL & VENT: CPT | Performed by: INTERNAL MEDICINE

## 2023-05-01 PROCEDURE — 250N000013 HC RX MED GY IP 250 OP 250 PS 637: Performed by: NURSE PRACTITIONER

## 2023-05-01 PROCEDURE — 99152 MOD SED SAME PHYS/QHP 5/>YRS: CPT | Performed by: INTERNAL MEDICINE

## 2023-05-01 PROCEDURE — 36415 COLL VENOUS BLD VENIPUNCTURE: CPT | Performed by: INTERNAL MEDICINE

## 2023-05-01 PROCEDURE — 999N000054 HC STATISTIC EKG NON-CHARGEABLE

## 2023-05-01 PROCEDURE — C1769 GUIDE WIRE: HCPCS | Performed by: INTERNAL MEDICINE

## 2023-05-01 PROCEDURE — 71045 X-RAY EXAM CHEST 1 VIEW: CPT | Mod: 26 | Performed by: RADIOLOGY

## 2023-05-01 PROCEDURE — C1900 LEAD, CORONARY VENOUS: HCPCS | Performed by: INTERNAL MEDICINE

## 2023-05-01 PROCEDURE — 99153 MOD SED SAME PHYS/QHP EA: CPT | Performed by: INTERNAL MEDICINE

## 2023-05-01 PROCEDURE — 93010 ELECTROCARDIOGRAM REPORT: CPT | Mod: XU | Performed by: INTERNAL MEDICINE

## 2023-05-01 PROCEDURE — 999N000134 HC STATISTIC PP CARE STAGE 3

## 2023-05-01 PROCEDURE — C1894 INTRO/SHEATH, NON-LASER: HCPCS | Performed by: INTERNAL MEDICINE

## 2023-05-01 PROCEDURE — 258N000003 HC RX IP 258 OP 636: Performed by: INTERNAL MEDICINE

## 2023-05-01 PROCEDURE — 250N000011 HC RX IP 250 OP 636: Performed by: INTERNAL MEDICINE

## 2023-05-01 PROCEDURE — 93281 PM DEVICE PROGR EVAL MULTI: CPT | Mod: 26 | Performed by: INTERNAL MEDICINE

## 2023-05-01 PROCEDURE — 93005 ELECTROCARDIOGRAM TRACING: CPT

## 2023-05-01 PROCEDURE — C2621 PMKR, OTHER THAN SING/DUAL: HCPCS | Performed by: INTERNAL MEDICINE

## 2023-05-01 PROCEDURE — C1898 LEAD, PMKR, OTHER THAN TRANS: HCPCS | Performed by: INTERNAL MEDICINE

## 2023-05-01 PROCEDURE — 250N000009 HC RX 250: Performed by: INTERNAL MEDICINE

## 2023-05-01 DEVICE — CRT-P PERCEPTA MRI QUAD W4TR01: Type: IMPLANTABLE DEVICE | Status: FUNCTIONAL

## 2023-05-01 DEVICE — LEAD ATTAIN 479888 STABILITY QUAD MRI SURESCAN: Type: IMPLANTABLE DEVICE | Status: FUNCTIONAL

## 2023-05-01 DEVICE — IMP LEAD PACING BIPOLAR CAPSUREFIX NOVUS 52CM 5076-52: Type: IMPLANTABLE DEVICE | Status: FUNCTIONAL

## 2023-05-01 DEVICE — IMP LEAD PACING BIPOLAR CAPSUREFIX NOVUS 58CM 5076-58: Type: IMPLANTABLE DEVICE | Status: FUNCTIONAL

## 2023-05-01 RX ORDER — CEPHALEXIN 500 MG/1
500 TABLET ORAL 3 TIMES DAILY
Qty: 15 TABLET | Refills: 0 | Status: SHIPPED | OUTPATIENT
Start: 2023-05-01 | End: 2023-05-06

## 2023-05-01 RX ORDER — CEFAZOLIN SODIUM 2 G/100ML
2 INJECTION, SOLUTION INTRAVENOUS
Status: COMPLETED | OUTPATIENT
Start: 2023-05-01 | End: 2023-05-01

## 2023-05-01 RX ORDER — ACETAMINOPHEN 325 MG/1
325 TABLET ORAL EVERY 4 HOURS PRN
Status: DISCONTINUED | OUTPATIENT
Start: 2023-05-01 | End: 2023-05-01 | Stop reason: HOSPADM

## 2023-05-01 RX ORDER — NALOXONE HYDROCHLORIDE 0.4 MG/ML
0.2 INJECTION, SOLUTION INTRAMUSCULAR; INTRAVENOUS; SUBCUTANEOUS
Status: DISCONTINUED | OUTPATIENT
Start: 2023-05-01 | End: 2023-05-01 | Stop reason: HOSPADM

## 2023-05-01 RX ORDER — SODIUM CHLORIDE 9 MG/ML
INJECTION, SOLUTION INTRAVENOUS CONTINUOUS
Status: DISCONTINUED | OUTPATIENT
Start: 2023-05-01 | End: 2023-05-01 | Stop reason: HOSPADM

## 2023-05-01 RX ORDER — NALOXONE HYDROCHLORIDE 0.4 MG/ML
0.4 INJECTION, SOLUTION INTRAMUSCULAR; INTRAVENOUS; SUBCUTANEOUS
Status: DISCONTINUED | OUTPATIENT
Start: 2023-05-01 | End: 2023-05-01 | Stop reason: HOSPADM

## 2023-05-01 RX ORDER — LIDOCAINE HYDROCHLORIDE 20 MG/ML
INJECTION, SOLUTION INFILTRATION; PERINEURAL
Status: DISCONTINUED | OUTPATIENT
Start: 2023-05-01 | End: 2023-05-01 | Stop reason: HOSPADM

## 2023-05-01 RX ORDER — OXYCODONE AND ACETAMINOPHEN 5; 325 MG/1; MG/1
1 TABLET ORAL EVERY 4 HOURS PRN
Status: DISCONTINUED | OUTPATIENT
Start: 2023-05-01 | End: 2023-05-01 | Stop reason: HOSPADM

## 2023-05-01 RX ORDER — FENTANYL CITRATE 50 UG/ML
INJECTION, SOLUTION INTRAMUSCULAR; INTRAVENOUS
Status: DISCONTINUED | OUTPATIENT
Start: 2023-05-01 | End: 2023-05-01 | Stop reason: HOSPADM

## 2023-05-01 RX ORDER — ONDANSETRON 2 MG/ML
4 INJECTION INTRAMUSCULAR; INTRAVENOUS EVERY 6 HOURS PRN
Status: DISCONTINUED | OUTPATIENT
Start: 2023-05-01 | End: 2023-05-01 | Stop reason: HOSPADM

## 2023-05-01 RX ORDER — COVID-19 ANTIGEN TEST
440 KIT MISCELLANEOUS PRN
COMMUNITY

## 2023-05-01 RX ORDER — LIDOCAINE 40 MG/G
CREAM TOPICAL
Status: DISCONTINUED | OUTPATIENT
Start: 2023-05-01 | End: 2023-05-01 | Stop reason: HOSPADM

## 2023-05-01 RX ADMIN — ACETAMINOPHEN 325 MG: 325 TABLET, FILM COATED ORAL at 12:18

## 2023-05-01 RX ADMIN — CEFAZOLIN SODIUM 2 G: 10 INJECTION, POWDER, FOR SOLUTION INTRAVENOUS at 08:15

## 2023-05-01 RX ADMIN — ONDANSETRON 4 MG: 2 INJECTION INTRAMUSCULAR; INTRAVENOUS at 13:06

## 2023-05-01 RX ADMIN — SODIUM CHLORIDE: 9 INJECTION, SOLUTION INTRAVENOUS at 07:49

## 2023-05-01 ASSESSMENT — ACTIVITIES OF DAILY LIVING (ADL)
ADLS_ACUITY_SCORE: 35

## 2023-05-01 NOTE — PROGRESS NOTES
Pt on 2A being prepped for pacemaker implant.  Labs WNL.  Pt being consented.  Antibiotic at the bedside, ready for CCL nurse to infuse.  Pt's family at the bedside.

## 2023-05-01 NOTE — PROGRESS NOTES
D/I/A: Pt roomed on 3C in bay [address].  Arrived via litter and accompanied by CL RN. On/Off: Off monitor.  VSSA.  Rhythm upon arrival paced rhythm on monitor.  Denies pain or sob.  Reviewed activity restrictions and when to notify RN, ie-changes to breathing or increased chest pressure or chest pain.  CCL access:  Left chest site covered with primapore WNL.  P: Continue to monitor status.  Discharge to home once meeting criteria.

## 2023-05-01 NOTE — PROGRESS NOTES
D/I/A:  Patient is tolerating liquids and foods, ambulating, urinating, puncture site stable (no bleeding and no hematoma) and patient has a . Left chest primapore WNL; scant drainage unchanged since arrival to unit 3C. CXR and EKG completed.   A+O x4 and making needs known.  CCL access site C/D/I; no bleeding or hematoma; CMS intact.  VSSA.  Paced rhythm on monitor.  IV access removed.  Education completed and outlined in AVS or handout: medications reviewed with patient.  Questions answered prior to discharge.  Belongings returned to patient at discharge.    P: Discharged to self care.  Patient to follow up with appts as per discharge instruction.

## 2023-05-01 NOTE — DISCHARGE INSTRUCTIONS
Home Care after a Pacemaker Implant  After you go home:  Have an adult stay with you for 24 hours.  Drink plenty of fluids.  You may eat your normal diet, unless your doctor tells you otherwise.  For 24 hours:  Relax and take it easy.  Do NOT smoke.  Do NOT make any important or legal decisions.  Do NOT drive or operate machines at home or at work.  Do NOT drink alcohol.    Wound care:  Keep your incision (surgery wound) dry for 3 days.  After 3 days, you may remove the outer bandage.  Keep the strips of tape on.  They will be removed at your clinic visit.  Check for signs of infection each day.  These include increased redness, swelling, drainage, bleeding or a fever over 101 F (38.3 C).  Call us immediately if you see any of these signs.  If there are no signs of infection, you may shower in 3 days.  Do not submerge the incision (in a bath tub, hot tub, or swimming pool) until fully healed.    Pain:   You may have mild to moderate pain for 3 to 5 days.  Take acetaminophen (Tylenol) or ibuprofen (Advil) for the pain.  Call us if the pain is severe or lasts more than 5 days.    Activity:  After 24 hours, slowly return to your normal activities.   Healing will take 4 to 6 weeks.  No driving for 3 days  Avoid climbing a ladder alone.  It is best to stay within 4 feet of the ground.  Avoid anything that may cause rough contact or a hard hit to your chest.  This includes football, hockey, and other contact sports.  For at least 4 weeks:  Do not raise your affected arm above your shoulder.  Do not use your affected arm to push, pull, or lift anything over 10 pounds.  Avoid repetitive upper body activities for 6 weeks (ie: golf, swimming, and weight lifting)    Follow Up Visits:  Return to the clinic in 7 to 10 days to have your device and wound checked.      Telling others about your device:  Before you have any medical tests or treatments, tell the doctors, dentists, and other care providers about your device.  There  are a few tests and treatments that may interfere with your device.  (These include MRI, radiation therapy, electrocautery, and others.)  Your care team may need to take special steps to keep you safe.  Before you leave the hospital, you will receive a temporary ID card.  A permanent card will be mailed to you about 6 to 8 weeks later.  Always carry the ID card with you.  It has important details about your device.  You should also get a MedicAlert ID.  Please ask us for a MedicAlert brochure, or go to [email or web address].    Safety near electrical equipment:  All of these are safe to use when in good repair:  Microwaves  Radios  Cordless phone  Remote controls  Small electrical tools  Cell phones: Keep cell phones at least 6 inches from your device.  Do not carry the phone in a pocket near your device.  Security mullins: It is okay to walk through security mullins at the airports and department stores.  Tell airport security that you have a pacemaker.  They should keep the screening wand at least 6 inches from your device.  Full-body scanners are safe.  Avoid the following:   MRI tests in the hospital unless you have a MRI safe pacemaker.          Arc welding, chain saws and high-powered industrial or commercial tools.   Power lines, power plants and large power generators.   Electric body fat scales.   Magnetic mattress pads or pillow.    Questions?  Please call [facility].   Device Nurse:          Business Hours:  [phone]                       After Hours:  [phone]   Choose option 4, then ask for the on-call device nurse at job code [phone].    Please have your incision checked locally in 1 week. We will schedule you for a remote transmission using your home monitor in 1 week. Your next device clinic appt is scheduled for 8/16/23 at 2:00 PM.            [facility]  [facility] [address]  [address] [place] [address]  [phone]

## 2023-05-01 NOTE — H&P
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      He has known gene positive myotonic dystrophy type 2. In 2008, he had an echo which showed LVEF 40-45% and he was found to have AFL. He had a DCCVs in 2009. No recurrences since. A follow-up echo showed LVEF 50-55% in sinus. NM stress test in 2009 which showed thinning at the inferior base and no ischemia.He was lost to Cardiology follow-up until he res established with Dr. Ashton. A CMR from 7/2022 showed LVEF 46%, JEFFERSON, and LGE mid myocardial enhancement in the septum, and no ischemia. His ziopatch monitor from, 4/25/22-5/1/22 demonstrated sinus with 2.3% PVCs, no VT, and brief AV Wenckebach at 92bpm with sinus slowing prior. ECG shows bifascicular block and he was referred to EP for conduction delays in a myotonic dystrophy patient. ECG has evolved from RBBB to bifascicular block from April to July, with IA prolonging from 200 to 224 ms range, along with widening of the QRS from around 120 ms when measured manually to >140 ms. A recent CMR showed low normal biventricular function. LV EF 52%. RVEF 51% minimal improvement from prior study (reviewed again and EF the same in the 45% range) . No significant valvular disease. Mild non-ischemic fibrosis in the septum unchanged from prior study. We discussed recommendation for CRTP which he is agreeable to proceed and presents for today.        Active problem list:     There are no problems to display for this  patient.           Medications (include herbals and vitamins):      Current Facility-Administered Medications   Medication     ceFAZolin (ANCEF) 2 g in 100 mL D5W intermittent infusion     lidocaine (LMX4) cream     lidocaine 1 % 0.1-1 mL     sodium chloride (PF) 0.9% PF flush 3 mL     sodium chloride (PF) 0.9% PF flush 3 mL     sodium chloride (PF) 0.9% PF flush 3 mL     sodium chloride 0.9% infusion           Medication List      There are no discharge medications for this visit.              Allergies:      Allergies   Allergen Reactions     Sulfa Antibiotics Dermatitis, Headache and Photosensitivity             Social History:     Social History     Tobacco Use     Smoking status: Never     Smokeless tobacco: Never   Vaping Use     Vaping status: Not on file   Substance Use Topics     Alcohol use: Not on file            Physical Exam:   All vitals have been reviewed  Patient Vitals for the past 8 hrs:   BP Temp Temp src Pulse Resp SpO2 Weight   05/01/23 0700 136/74 98.1  F (36.7  C) Oral 66 16 99 % 103.8 kg (228 lb 13.4 oz)     No intake/output data recorded.  Airway assessment:   Patient is able to open mouth wide  Patient is able to stick out tongue}      ENT:   Normocephalic, without obvious abnormality, atraumatic, sinuses nontender on palpation, external ears without lesions, oral pharynx with moist mucous membranes, tonsils without erythema or exudates, gums normal and good dentition.     Neck:   Supple, symmetrical, trachea midline, no adenopathy, thyroid symmetric, not enlarged and no tenderness, skin normal     Lungs:   No increased work of breathing, good air exchange, clear to auscultation bilaterally, no crackles or wheezing     Cardiovascular:   Normal apical impulse, regular rate and rhythm, normal S1 and S2, no S3 or S4, and no murmur noted             Lab / Radiology Results:     Lab Results   Component Value Date    WBC 6.4 05/01/2023    RBC 4.61 05/01/2023    HGB 14.7 05/01/2023    HCT 43.9  05/01/2023    MCV 95 05/01/2023    RDW 13.3 05/01/2023     05/01/2023      Lab Results   Component Value Date    WBC 6.4 05/01/2023     Lab Results   Component Value Date     05/01/2023     Lab Results   Component Value Date    HGB 14.7 05/01/2023    HCT 43.9 05/01/2023     Lab Results   Component Value Date     05/01/2023    CO2 25 05/01/2023    CO2 28 04/25/2022    BUN 21.4 05/01/2023    BUN 18 04/25/2022     Lab Results   Component Value Date     05/01/2023    CO2 25 05/01/2023    CO2 28 04/25/2022    BUN 21.4 05/01/2023    BUN 18 04/25/2022     Lab Results   Component Value Date     05/01/2023     Lab Results   Component Value Date    BUN 21.4 05/01/2023    BUN 18 04/25/2022     Lab Results   Component Value Date    TSH 2.60 01/10/2022             Plan:   Patient's active problems diagnostically and therapeutically optimized for the planned procedure. Patient here for CRTP. Procedure explained in detail to patient including indications, risks, and benefits. We discussed with the patient the rationale for CRTP placement, alternative therapies,  technical aspects of the surgical procedure, risks/benefits of therapy and need for long-term follow-up in the Device Clinic.  The risk of CRTP placement include: over sedation, reaction to local anesthetic, reaction to narcotics or benzodiazipines used for moderate secation, localized bleeding, internal bleeding, collapsed lung, and acute or late infections. There is the possibilty of unforseen complications as well such as device or lead failure, lead dislodgement. In regard to resynchronization therapy, the additional risks included: no improvement in heart failure symptoms or inability to place the left ventricular lead via the coronary sinus.  All question/concerns were addressed. After our discussion, the patient verbalized understanding and wishes to proceed with CRTP implant. He states understanding and wishes to procced.     Donna  AUGUSTO Gonzalez CNP  Electrophysiology Consult Service  Pager: [phone]

## 2023-05-03 LAB
ATRIAL RATE - MUSE: 50 BPM
DIASTOLIC BLOOD PRESSURE - MUSE: NORMAL MMHG
INTERPRETATION ECG - MUSE: NORMAL
P AXIS - MUSE: NORMAL DEGREES
PR INTERVAL - MUSE: 296 MS
QRS DURATION - MUSE: 150 MS
QT - MUSE: 512 MS
QTC - MUSE: 466 MS
R AXIS - MUSE: -53 DEGREES
SYSTOLIC BLOOD PRESSURE - MUSE: NORMAL MMHG
T AXIS - MUSE: -31 DEGREES
VENTRICULAR RATE- MUSE: 50 BPM

## 2023-05-05 LAB
MDC_IDC_EPISODE_DTM: NORMAL
MDC_IDC_EPISODE_DURATION: 195 S
MDC_IDC_EPISODE_DURATION: 419 S
MDC_IDC_EPISODE_DURATION: 439 S
MDC_IDC_EPISODE_DURATION: 49 S
MDC_IDC_EPISODE_DURATION: 63 S
MDC_IDC_EPISODE_ID: 1
MDC_IDC_EPISODE_ID: 2
MDC_IDC_EPISODE_ID: 3
MDC_IDC_EPISODE_ID: 4
MDC_IDC_EPISODE_ID: 5
MDC_IDC_EPISODE_ID: 6
MDC_IDC_EPISODE_TYPE: NORMAL
MDC_IDC_LEAD_IMPLANT_DT: NORMAL
MDC_IDC_LEAD_LOCATION: NORMAL
MDC_IDC_LEAD_LOCATION_DETAIL_1: NORMAL
MDC_IDC_LEAD_MFG: NORMAL
MDC_IDC_LEAD_MODEL: NORMAL
MDC_IDC_LEAD_POLARITY_TYPE: NORMAL
MDC_IDC_LEAD_SERIAL: NORMAL
MDC_IDC_LEAD_SPECIAL_FUNCTION: NORMAL
MDC_IDC_MSMT_BATTERY_DTM: NORMAL
MDC_IDC_MSMT_BATTERY_RRT_TRIGGER: 2.6
MDC_IDC_MSMT_BATTERY_STATUS: NORMAL
MDC_IDC_MSMT_BATTERY_VOLTAGE: 3.2 V
MDC_IDC_MSMT_LEADCHNL_LV_IMPEDANCE_VALUE: 1045 OHM
MDC_IDC_MSMT_LEADCHNL_LV_IMPEDANCE_VALUE: 1083 OHM
MDC_IDC_MSMT_LEADCHNL_LV_IMPEDANCE_VALUE: 1577 OHM
MDC_IDC_MSMT_LEADCHNL_LV_IMPEDANCE_VALUE: 1881 OHM
MDC_IDC_MSMT_LEADCHNL_LV_IMPEDANCE_VALUE: 2052 OHM
MDC_IDC_MSMT_LEADCHNL_LV_IMPEDANCE_VALUE: 2261 OHM
MDC_IDC_MSMT_LEADCHNL_LV_IMPEDANCE_VALUE: 399 OHM
MDC_IDC_MSMT_LEADCHNL_LV_IMPEDANCE_VALUE: 570 OHM
MDC_IDC_MSMT_LEADCHNL_LV_IMPEDANCE_VALUE: 684 OHM
MDC_IDC_MSMT_LEADCHNL_LV_IMPEDANCE_VALUE: 874 OHM
MDC_IDC_MSMT_LEADCHNL_LV_PACING_THRESHOLD_AMPLITUDE: 1.75 V
MDC_IDC_MSMT_LEADCHNL_LV_PACING_THRESHOLD_PULSEWIDTH: 0.8 MS
MDC_IDC_MSMT_LEADCHNL_RA_IMPEDANCE_VALUE: 494 OHM
MDC_IDC_MSMT_LEADCHNL_RA_PACING_THRESHOLD_AMPLITUDE: 0.5 V
MDC_IDC_MSMT_LEADCHNL_RA_PACING_THRESHOLD_PULSEWIDTH: 0.4 MS
MDC_IDC_MSMT_LEADCHNL_RA_SENSING_INTR_AMPL: 1.3 MV
MDC_IDC_MSMT_LEADCHNL_RV_IMPEDANCE_VALUE: 532 OHM
MDC_IDC_MSMT_LEADCHNL_RV_PACING_THRESHOLD_AMPLITUDE: 0.5 V
MDC_IDC_MSMT_LEADCHNL_RV_PACING_THRESHOLD_PULSEWIDTH: 0.4 MS
MDC_IDC_MSMT_LEADCHNL_RV_SENSING_INTR_AMPL: 13 MV
MDC_IDC_PG_IMPLANT_DTM: NORMAL
MDC_IDC_PG_MFG: NORMAL
MDC_IDC_PG_MODEL: NORMAL
MDC_IDC_PG_SERIAL: NORMAL
MDC_IDC_PG_TYPE: NORMAL
MDC_IDC_SESS_CLINIC_NAME: NORMAL
MDC_IDC_SESS_DTM: NORMAL
MDC_IDC_SESS_TYPE: NORMAL
MDC_IDC_SET_BRADY_AT_MODE_SWITCH_RATE: 171 {BEATS}/MIN
MDC_IDC_SET_BRADY_LOWRATE: 50 {BEATS}/MIN
MDC_IDC_SET_BRADY_MAX_SENSOR_RATE: 130 {BEATS}/MIN
MDC_IDC_SET_BRADY_MAX_TRACKING_RATE: 130 {BEATS}/MIN
MDC_IDC_SET_BRADY_MODE: NORMAL
MDC_IDC_SET_BRADY_PAV_DELAY_HIGH: 100 MS
MDC_IDC_SET_BRADY_PAV_DELAY_LOW: 350 MS
MDC_IDC_SET_BRADY_SAV_DELAY_HIGH: 70 MS
MDC_IDC_SET_BRADY_SAV_DELAY_LOW: 300 MS
MDC_IDC_SET_CRT_LVRV_DELAY: 0 MS
MDC_IDC_SET_CRT_PACED_CHAMBERS: NORMAL
MDC_IDC_SET_LEADCHNL_LV_PACING_AMPLITUDE: 3.5 V
MDC_IDC_SET_LEADCHNL_LV_PACING_ANODE_ELECTRODE_1: NORMAL
MDC_IDC_SET_LEADCHNL_LV_PACING_ANODE_LOCATION_1: NORMAL
MDC_IDC_SET_LEADCHNL_LV_PACING_CAPTURE_MODE: NORMAL
MDC_IDC_SET_LEADCHNL_LV_PACING_CATHODE_ELECTRODE_1: NORMAL
MDC_IDC_SET_LEADCHNL_LV_PACING_CATHODE_LOCATION_1: NORMAL
MDC_IDC_SET_LEADCHNL_LV_PACING_POLARITY: NORMAL
MDC_IDC_SET_LEADCHNL_LV_PACING_PULSEWIDTH: 0.8 MS
MDC_IDC_SET_LEADCHNL_RA_PACING_AMPLITUDE: 3.5 V
MDC_IDC_SET_LEADCHNL_RA_PACING_ANODE_ELECTRODE_1: NORMAL
MDC_IDC_SET_LEADCHNL_RA_PACING_ANODE_LOCATION_1: NORMAL
MDC_IDC_SET_LEADCHNL_RA_PACING_CAPTURE_MODE: NORMAL
MDC_IDC_SET_LEADCHNL_RA_PACING_CATHODE_ELECTRODE_1: NORMAL
MDC_IDC_SET_LEADCHNL_RA_PACING_CATHODE_LOCATION_1: NORMAL
MDC_IDC_SET_LEADCHNL_RA_PACING_POLARITY: NORMAL
MDC_IDC_SET_LEADCHNL_RA_PACING_PULSEWIDTH: 0.4 MS
MDC_IDC_SET_LEADCHNL_RA_SENSING_ANODE_ELECTRODE_1: NORMAL
MDC_IDC_SET_LEADCHNL_RA_SENSING_ANODE_LOCATION_1: NORMAL
MDC_IDC_SET_LEADCHNL_RA_SENSING_CATHODE_ELECTRODE_1: NORMAL
MDC_IDC_SET_LEADCHNL_RA_SENSING_CATHODE_LOCATION_1: NORMAL
MDC_IDC_SET_LEADCHNL_RA_SENSING_POLARITY: NORMAL
MDC_IDC_SET_LEADCHNL_RA_SENSING_SENSITIVITY: 0.3 MV
MDC_IDC_SET_LEADCHNL_RV_PACING_AMPLITUDE: 3.5 V
MDC_IDC_SET_LEADCHNL_RV_PACING_ANODE_ELECTRODE_1: NORMAL
MDC_IDC_SET_LEADCHNL_RV_PACING_ANODE_LOCATION_1: NORMAL
MDC_IDC_SET_LEADCHNL_RV_PACING_CAPTURE_MODE: NORMAL
MDC_IDC_SET_LEADCHNL_RV_PACING_CATHODE_ELECTRODE_1: NORMAL
MDC_IDC_SET_LEADCHNL_RV_PACING_CATHODE_LOCATION_1: NORMAL
MDC_IDC_SET_LEADCHNL_RV_PACING_POLARITY: NORMAL
MDC_IDC_SET_LEADCHNL_RV_PACING_PULSEWIDTH: 0.4 MS
MDC_IDC_SET_LEADCHNL_RV_SENSING_ANODE_ELECTRODE_1: NORMAL
MDC_IDC_SET_LEADCHNL_RV_SENSING_ANODE_LOCATION_1: NORMAL
MDC_IDC_SET_LEADCHNL_RV_SENSING_CATHODE_ELECTRODE_1: NORMAL
MDC_IDC_SET_LEADCHNL_RV_SENSING_CATHODE_LOCATION_1: NORMAL
MDC_IDC_SET_LEADCHNL_RV_SENSING_POLARITY: NORMAL
MDC_IDC_SET_LEADCHNL_RV_SENSING_SENSITIVITY: 0.9 MV
MDC_IDC_SET_ZONE_DETECTION_INTERVAL: 350 MS
MDC_IDC_SET_ZONE_DETECTION_INTERVAL: 400 MS
MDC_IDC_SET_ZONE_TYPE: NORMAL
MDC_IDC_STAT_AT_BURDEN_PERCENT: 0 %
MDC_IDC_STAT_AT_DTM_END: NORMAL
MDC_IDC_STAT_AT_DTM_START: NORMAL
MDC_IDC_STAT_BRADY_AP_VP_PERCENT: 0.48 %
MDC_IDC_STAT_BRADY_AP_VS_PERCENT: 46.02 %
MDC_IDC_STAT_BRADY_AS_VP_PERCENT: 1.29 %
MDC_IDC_STAT_BRADY_AS_VS_PERCENT: 52.21 %
MDC_IDC_STAT_BRADY_DTM_END: NORMAL
MDC_IDC_STAT_BRADY_DTM_START: NORMAL
MDC_IDC_STAT_BRADY_RA_PERCENT_PACED: 46.72 %
MDC_IDC_STAT_BRADY_RV_PERCENT_PACED: 1.72 %
MDC_IDC_STAT_CRT_DTM_END: NORMAL
MDC_IDC_STAT_CRT_DTM_START: NORMAL
MDC_IDC_STAT_CRT_LV_PERCENT_PACED: 1.77 %
MDC_IDC_STAT_CRT_PERCENT_PACED: 1.77 %
MDC_IDC_STAT_EPISODE_RECENT_COUNT: 0
MDC_IDC_STAT_EPISODE_RECENT_COUNT_DTM_END: NORMAL
MDC_IDC_STAT_EPISODE_RECENT_COUNT_DTM_START: NORMAL
MDC_IDC_STAT_EPISODE_TOTAL_COUNT: 0
MDC_IDC_STAT_EPISODE_TOTAL_COUNT_DTM_END: NORMAL
MDC_IDC_STAT_EPISODE_TOTAL_COUNT_DTM_START: NORMAL
MDC_IDC_STAT_EPISODE_TYPE: NORMAL

## 2023-05-08 ENCOUNTER — ANCILLARY PROCEDURE (OUTPATIENT)
Dept: CARDIOLOGY | Facility: CLINIC | Age: 68
End: 2023-05-08
Attending: INTERNAL MEDICINE
Payer: COMMERCIAL

## 2023-05-08 DIAGNOSIS — Z95.0 CARDIAC RESYNCHRONIZATION THERAPY PACEMAKER (CRT-P) IN PLACE: ICD-10-CM

## 2023-05-08 PROCEDURE — 93296 REM INTERROG EVL PM/IDS: CPT

## 2023-05-08 PROCEDURE — 93294 REM INTERROG EVL PM/LDLS PM: CPT | Performed by: INTERNAL MEDICINE

## 2023-05-11 LAB
MDC_IDC_EPISODE_DTM: NORMAL
MDC_IDC_EPISODE_DURATION: 1350 S
MDC_IDC_EPISODE_DURATION: 2235 S
MDC_IDC_EPISODE_DURATION: 4010 S
MDC_IDC_EPISODE_DURATION: 47 S
MDC_IDC_EPISODE_DURATION: 605 S
MDC_IDC_EPISODE_DURATION: 693 S
MDC_IDC_EPISODE_ID: 10
MDC_IDC_EPISODE_ID: 11
MDC_IDC_EPISODE_ID: 12
MDC_IDC_EPISODE_ID: 13
MDC_IDC_EPISODE_ID: 14
MDC_IDC_EPISODE_ID: 1603
MDC_IDC_EPISODE_ID: 1604
MDC_IDC_EPISODE_ID: 1605
MDC_IDC_EPISODE_ID: 1606
MDC_IDC_EPISODE_ID: 1607
MDC_IDC_EPISODE_ID: 1608
MDC_IDC_EPISODE_ID: 1609
MDC_IDC_EPISODE_TYPE: NORMAL
MDC_IDC_LEAD_IMPLANT_DT: NORMAL
MDC_IDC_LEAD_LOCATION: NORMAL
MDC_IDC_LEAD_LOCATION_DETAIL_1: NORMAL
MDC_IDC_LEAD_MFG: NORMAL
MDC_IDC_LEAD_MODEL: NORMAL
MDC_IDC_LEAD_POLARITY_TYPE: NORMAL
MDC_IDC_LEAD_SERIAL: NORMAL
MDC_IDC_LEAD_SPECIAL_FUNCTION: NORMAL
MDC_IDC_MSMT_BATTERY_DTM: NORMAL
MDC_IDC_MSMT_BATTERY_REMAINING_LONGEVITY: 166 MO
MDC_IDC_MSMT_BATTERY_RRT_TRIGGER: 2.6
MDC_IDC_MSMT_BATTERY_STATUS: NORMAL
MDC_IDC_MSMT_BATTERY_VOLTAGE: 3.22 V
MDC_IDC_MSMT_LEADCHNL_LV_IMPEDANCE_VALUE: 1045 OHM
MDC_IDC_MSMT_LEADCHNL_LV_IMPEDANCE_VALUE: 1216 OHM
MDC_IDC_MSMT_LEADCHNL_LV_IMPEDANCE_VALUE: 1349 OHM
MDC_IDC_MSMT_LEADCHNL_LV_IMPEDANCE_VALUE: 342 OHM
MDC_IDC_MSMT_LEADCHNL_LV_IMPEDANCE_VALUE: 513 OHM
MDC_IDC_MSMT_LEADCHNL_LV_IMPEDANCE_VALUE: 646 OHM
MDC_IDC_MSMT_LEADCHNL_LV_IMPEDANCE_VALUE: 779 OHM
MDC_IDC_MSMT_LEADCHNL_LV_IMPEDANCE_VALUE: 779 OHM
MDC_IDC_MSMT_LEADCHNL_LV_IMPEDANCE_VALUE: 855 OHM
MDC_IDC_MSMT_LEADCHNL_LV_IMPEDANCE_VALUE: 912 OHM
MDC_IDC_MSMT_LEADCHNL_LV_PACING_THRESHOLD_AMPLITUDE: 1.5 V
MDC_IDC_MSMT_LEADCHNL_LV_PACING_THRESHOLD_PULSEWIDTH: 0.8 MS
MDC_IDC_MSMT_LEADCHNL_RA_IMPEDANCE_VALUE: 342 OHM
MDC_IDC_MSMT_LEADCHNL_RA_IMPEDANCE_VALUE: 475 OHM
MDC_IDC_MSMT_LEADCHNL_RA_PACING_THRESHOLD_AMPLITUDE: 0.5 V
MDC_IDC_MSMT_LEADCHNL_RA_PACING_THRESHOLD_PULSEWIDTH: 0.4 MS
MDC_IDC_MSMT_LEADCHNL_RA_SENSING_INTR_AMPL: 4.12 MV
MDC_IDC_MSMT_LEADCHNL_RV_IMPEDANCE_VALUE: 399 OHM
MDC_IDC_MSMT_LEADCHNL_RV_IMPEDANCE_VALUE: 475 OHM
MDC_IDC_MSMT_LEADCHNL_RV_PACING_THRESHOLD_AMPLITUDE: 0.5 V
MDC_IDC_MSMT_LEADCHNL_RV_PACING_THRESHOLD_PULSEWIDTH: 0.4 MS
MDC_IDC_MSMT_LEADCHNL_RV_SENSING_INTR_AMPL: 8.12 MV
MDC_IDC_PG_IMPLANT_DTM: NORMAL
MDC_IDC_PG_MFG: NORMAL
MDC_IDC_PG_MODEL: NORMAL
MDC_IDC_PG_SERIAL: NORMAL
MDC_IDC_PG_TYPE: NORMAL
MDC_IDC_SESS_CLINIC_NAME: NORMAL
MDC_IDC_SESS_DTM: NORMAL
MDC_IDC_SESS_TYPE: NORMAL
MDC_IDC_SET_BRADY_AT_MODE_SWITCH_RATE: 171 {BEATS}/MIN
MDC_IDC_SET_BRADY_LOWRATE: 50 {BEATS}/MIN
MDC_IDC_SET_BRADY_MAX_SENSOR_RATE: 130 {BEATS}/MIN
MDC_IDC_SET_BRADY_MAX_TRACKING_RATE: 130 {BEATS}/MIN
MDC_IDC_SET_BRADY_MODE: NORMAL
MDC_IDC_SET_BRADY_PAV_DELAY_HIGH: 100 MS
MDC_IDC_SET_BRADY_PAV_DELAY_LOW: 350 MS
MDC_IDC_SET_BRADY_SAV_DELAY_HIGH: 70 MS
MDC_IDC_SET_BRADY_SAV_DELAY_LOW: 300 MS
MDC_IDC_SET_CRT_LVRV_DELAY: 0 MS
MDC_IDC_SET_CRT_PACED_CHAMBERS: NORMAL
MDC_IDC_SET_LEADCHNL_LV_PACING_AMPLITUDE: 2.75 V
MDC_IDC_SET_LEADCHNL_LV_PACING_ANODE_ELECTRODE_1: NORMAL
MDC_IDC_SET_LEADCHNL_LV_PACING_ANODE_LOCATION_1: NORMAL
MDC_IDC_SET_LEADCHNL_LV_PACING_CAPTURE_MODE: NORMAL
MDC_IDC_SET_LEADCHNL_LV_PACING_CATHODE_ELECTRODE_1: NORMAL
MDC_IDC_SET_LEADCHNL_LV_PACING_CATHODE_LOCATION_1: NORMAL
MDC_IDC_SET_LEADCHNL_LV_PACING_POLARITY: NORMAL
MDC_IDC_SET_LEADCHNL_LV_PACING_PULSEWIDTH: 0.8 MS
MDC_IDC_SET_LEADCHNL_RA_PACING_AMPLITUDE: 3.5 V
MDC_IDC_SET_LEADCHNL_RA_PACING_ANODE_ELECTRODE_1: NORMAL
MDC_IDC_SET_LEADCHNL_RA_PACING_ANODE_LOCATION_1: NORMAL
MDC_IDC_SET_LEADCHNL_RA_PACING_CAPTURE_MODE: NORMAL
MDC_IDC_SET_LEADCHNL_RA_PACING_CATHODE_ELECTRODE_1: NORMAL
MDC_IDC_SET_LEADCHNL_RA_PACING_CATHODE_LOCATION_1: NORMAL
MDC_IDC_SET_LEADCHNL_RA_PACING_POLARITY: NORMAL
MDC_IDC_SET_LEADCHNL_RA_PACING_PULSEWIDTH: 0.4 MS
MDC_IDC_SET_LEADCHNL_RA_SENSING_ANODE_ELECTRODE_1: NORMAL
MDC_IDC_SET_LEADCHNL_RA_SENSING_ANODE_LOCATION_1: NORMAL
MDC_IDC_SET_LEADCHNL_RA_SENSING_CATHODE_ELECTRODE_1: NORMAL
MDC_IDC_SET_LEADCHNL_RA_SENSING_CATHODE_LOCATION_1: NORMAL
MDC_IDC_SET_LEADCHNL_RA_SENSING_POLARITY: NORMAL
MDC_IDC_SET_LEADCHNL_RA_SENSING_SENSITIVITY: 0.3 MV
MDC_IDC_SET_LEADCHNL_RV_PACING_AMPLITUDE: 3.5 V
MDC_IDC_SET_LEADCHNL_RV_PACING_ANODE_ELECTRODE_1: NORMAL
MDC_IDC_SET_LEADCHNL_RV_PACING_ANODE_LOCATION_1: NORMAL
MDC_IDC_SET_LEADCHNL_RV_PACING_CAPTURE_MODE: NORMAL
MDC_IDC_SET_LEADCHNL_RV_PACING_CATHODE_ELECTRODE_1: NORMAL
MDC_IDC_SET_LEADCHNL_RV_PACING_CATHODE_LOCATION_1: NORMAL
MDC_IDC_SET_LEADCHNL_RV_PACING_POLARITY: NORMAL
MDC_IDC_SET_LEADCHNL_RV_PACING_PULSEWIDTH: 0.4 MS
MDC_IDC_SET_LEADCHNL_RV_SENSING_ANODE_ELECTRODE_1: NORMAL
MDC_IDC_SET_LEADCHNL_RV_SENSING_ANODE_LOCATION_1: NORMAL
MDC_IDC_SET_LEADCHNL_RV_SENSING_CATHODE_ELECTRODE_1: NORMAL
MDC_IDC_SET_LEADCHNL_RV_SENSING_CATHODE_LOCATION_1: NORMAL
MDC_IDC_SET_LEADCHNL_RV_SENSING_POLARITY: NORMAL
MDC_IDC_SET_LEADCHNL_RV_SENSING_SENSITIVITY: 0.9 MV
MDC_IDC_SET_ZONE_DETECTION_INTERVAL: 350 MS
MDC_IDC_SET_ZONE_DETECTION_INTERVAL: 400 MS
MDC_IDC_SET_ZONE_TYPE: NORMAL
MDC_IDC_STAT_AT_BURDEN_PERCENT: 0 %
MDC_IDC_STAT_AT_DTM_END: NORMAL
MDC_IDC_STAT_AT_DTM_START: NORMAL
MDC_IDC_STAT_BRADY_AP_VP_PERCENT: 5.72 %
MDC_IDC_STAT_BRADY_AP_VS_PERCENT: 11.7 %
MDC_IDC_STAT_BRADY_AS_VP_PERCENT: 1.55 %
MDC_IDC_STAT_BRADY_AS_VS_PERCENT: 81.03 %
MDC_IDC_STAT_BRADY_DTM_END: NORMAL
MDC_IDC_STAT_BRADY_DTM_START: NORMAL
MDC_IDC_STAT_BRADY_RA_PERCENT_PACED: 18.18 %
MDC_IDC_STAT_BRADY_RV_PERCENT_PACED: 7.27 %
MDC_IDC_STAT_CRT_DTM_END: NORMAL
MDC_IDC_STAT_CRT_DTM_START: NORMAL
MDC_IDC_STAT_CRT_LV_PERCENT_PACED: 7.23 %
MDC_IDC_STAT_CRT_PERCENT_PACED: 7.23 %
MDC_IDC_STAT_EPISODE_RECENT_COUNT: 0
MDC_IDC_STAT_EPISODE_RECENT_COUNT_DTM_END: NORMAL
MDC_IDC_STAT_EPISODE_RECENT_COUNT_DTM_START: NORMAL
MDC_IDC_STAT_EPISODE_TOTAL_COUNT: 0
MDC_IDC_STAT_EPISODE_TOTAL_COUNT_DTM_END: NORMAL
MDC_IDC_STAT_EPISODE_TOTAL_COUNT_DTM_START: NORMAL
MDC_IDC_STAT_EPISODE_TYPE: NORMAL

## 2023-05-17 DIAGNOSIS — G71.11 MYOTONIC DYSTROPHY, TYPE 2 (H): Primary | ICD-10-CM

## 2023-07-26 DIAGNOSIS — Z13.6 ENCOUNTER FOR SCREENING FOR CARDIOVASCULAR DISORDERS: ICD-10-CM

## 2023-07-26 DIAGNOSIS — G71.11 MYOTONIC DYSTROPHY, TYPE 2 (H): ICD-10-CM

## 2023-07-28 NOTE — TELEPHONE ENCOUNTER
losartan (COZAAR) 25 MG tablet   Last Written Prescription Date:   8/8/2022  Last Fill Quantity: 45,   # refills: 3  Last Office Visit  3/1/2023  Future Office visit:  8/14/2023  Routing refill request to provider for review/approval because:  Abnormal Creatinine lab  Refer to clinic for review   [facility]   Lab Test 05/01/23  0651   CR 0.53*      Lali Bruce RN  Central Triage Red Flags/Med Refills    Angiotensin-II Receptors Wwnetm2007/26/2023 10:32 AM   Protocol Details Normal serum creatinine on file in past 12 months

## 2023-07-31 RX ORDER — LOSARTAN POTASSIUM 25 MG/1
12.5 TABLET ORAL DAILY
Qty: 45 TABLET | Refills: 3 | Status: SHIPPED | OUTPATIENT
Start: 2023-07-31

## 2023-08-14 ENCOUNTER — VIRTUAL VISIT (OUTPATIENT)
Dept: CARDIOLOGY | Facility: CLINIC | Age: 68
End: 2023-08-14
Attending: INTERNAL MEDICINE
Payer: COMMERCIAL

## 2023-08-14 DIAGNOSIS — G71.11 MYOTONIC DYSTROPHY, TYPE 2 (H): ICD-10-CM

## 2023-08-14 DIAGNOSIS — Z13.6 ENCOUNTER FOR SCREENING FOR CARDIOVASCULAR DISORDERS: ICD-10-CM

## 2023-08-14 PROCEDURE — 99215 OFFICE O/P EST HI 40 MIN: CPT | Mod: VID | Performed by: INTERNAL MEDICINE

## 2023-08-14 NOTE — PATIENT INSTRUCTIONS
"You were seen today in the Cardiovascular Clinic at the [facility].      Cardiology Providers you saw during your visit:  Dr. Mark Ashton     Recommendations:   Echocardiogram as ordered  Follow up with Dr. Hadley.  Send a remote device check today.  Follow up with Dr. Ashton in 6 months (or sooner if needed) and then annually which can be coordinated with the visit with Dr. Cano.     Thank you for your visit today!   Please MyChart message or call if you have any questions or concerns.      During Business Hours:  [phone], option # 1 \"To leave a message for your care team\"     After hours, weekends or holidays:   [phone], Option #4  Ask to speak to the On-Call Cardiologist. Inform them you are a heart failure patient at the [facility].      Joan Tolbert RN BSN   Cardiology Nurse Coordinator - Heart Failure/C.O.R.E. Clinic  [facility]  [phone] option 1 to schedule an appointment or leave a message for your care team      "

## 2023-08-14 NOTE — NURSING NOTE
Patient confirms medications and allergies are accurate via patients echeck in completion, and or denies any changes since last reviewed/verified.       Gracy Yao, Virtual Facilitator  Is the patient currently in the state of [place]? YES    Visit mode:VIDEO    If the visit is dropped, the patient can be reconnected by: TELEPHONE VISIT: Phone number: [phone]    Will anyone else be joining the visit? NO      How would you like to obtain your AVS? MyChart    Are changes needed to the allergy or medication list? NO    Reason for visit: RECHECK

## 2023-08-14 NOTE — LETTER
2023      RE: Rajni Lara  [address] [facility] [address]       Dear Colleague,    Thank you for the opportunity to participate in the care of your patient, Rajni Lara, at the [facility] [place] [facility]. Please see a copy of my visit note below.    Neurocardiomyopathy Clinic Progress Note    Name: Rajni Lara  : 1955  MRN: [id number]    2023    Dear Dr. Rodriguez and Dr. Santiago,     I had the pleasure of seeing Mr. Rajni Lara, a 67 year old man today in the [facility] Neurocardiomyopathy Clinic for a a virtual follow up of myotonic dystrophy type 2 associated cardiomyopathy.     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 He also has a family history of myotonic dystrophy in his sister and also probably his mother and maternal grandfather. He saw Dr. Cano in [facility] clinic on 1/10/22. He had an echocardiogram in  and was incidentally idenitied to have atrial flutter which showed an LVEF of 40-45%. In  he had a successful cardioversion for atrial flutter. He had a echo in 2009 which showed an LVEF of 50-55% and normal RV function. He had a nuclear stress test in  which showed thinning at the inferior base and no ischemia. He has not had cardiovascular follow up since then.     I last saw him on  2023. He had a cardiac MRI which showed an LVEF of 52%, RVEF 51%, JEFFERSON, and mid myocardial enhancement in the septum, and no ischemia. His ziopatch monitor demonstrated sinus with 2.3% PVCs, no VT, and brief AV Wenckebach. He saw EP regarding bifasicular block and had a CRT-P implanted in May 2023.  He enjoyed the [employer] tournament though he did note that he had some difficulty due to leg weakness.  Overall he is feeling well.  He is not exercising currently but otherwise remains active.  Does note that he  continues to have difficulty going up stairs which has been progressive.  He has no dyspnea or chest pain.  His weight is stable though he notes his weight has increased approximately 20 pounds since the pandemic.  He does not have any edema, orthopnea or PND.  He notes that he has had lightheadedness when he is outside and standing in the sun and then he needs to lean on something to prevent himself from passing out.  For example this happened on Saturday when he was out in the yard with neighbors.  He has not had palpitations or he has not had syncope.  His appetite and energy levels remain preserved.     REVIEW OF SYSTEMS: 10 point ROS neg other than the symptoms noted above in the HPI.    PAST MEDICAL HISTORY:   1. Myotonic dystrophy type 2  2. History of atrial flutter s/p [facility] 2009  3. History of non ischemic cardiomyopathy   4. BPH      ALLERGIES:    Allergies   Allergen Reactions    Sulfa Antibiotics Dermatitis, Headache and Photosensitivity       MEDICATIONS:   acetaminophen (TYLENOL) 500 MG tablet, Take 1,000 mg by mouth  acetaminophen-codeine (TYLENOL #3) 300-30 MG tablet, Take 1-2 tablets by mouth every 4 hours as needed for moderate to severe pain  finasteride (PROSCAR) 5 MG tablet, Take 1 tablet by mouth every morning  losartan (COZAAR) 25 MG tablet, Take 0.5 tablets (12.5 mg) by mouth daily  naproxen sodium 220 MG capsule, Take 440 mg by mouth as needed (as needed)  nystatin-triamcinolone (MYCOLOG) 211138-1.1 UNIT/GM-% external ointment, Apply topically 2 times daily  tamsulosin (FLOMAX) 0.4 MG capsule, TAKE ONE CAPSULE BY MOUTH AT BEDTIME    No current facility-administered medications on file prior to visit.    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   Tobacco: never  Alcohol: rarely   Illicits: now  His family is from [place]     FAMILY HISTORY:  His family is from [place]. He has multiple maternal family members with  myotonic dystrophy.   Mom when 70 when falling and missing steps and in her 80s she was bedbound.   Sister is a physician and has MD2  Maternal grand father had a heart attack 59, and  and also had some leg weakness  Maternal uncle sudden death at age 64  Son and younger daughter tested negative. Older daughter is positive for MD2      PHYSICAL EXAM:   GENERAL: Healthy, alert and no distress  EYES: Eyes grossly normal to inspection.  No discharge or erythema, or obvious scleral/conjunctival abnormalities.  RESP: No audible wheeze, cough, or visible cyanosis.  No visible retractions or increased work of breathing.    SKIN: Visible skin clear. No significant rash, abnormal pigmentation or lesions.  NEURO: Cranial nerves grossly intact.  Mentation and speech appropriate for age.  PSYCH: Mentation appears normal, affect normal/bright, judgement and insight intact, normal speech and appearance well-groomed.        DIAGNOSTIC TESTING:      Latest Reference Range & Units 23 06:51   Sodium 136 - 145 mmol/L 140   Potassium 3.4 - 5.3 mmol/L 4.6   Chloride 98 - 107 mmol/L 107   Carbon Dioxide (CO2) 22 - 29 mmol/L 25   Urea Nitrogen 8.0 - 23.0 mg/dL 21.4   Creatinine 0.67 - 1.17 mg/dL 0.53 (L)   GFR Estimate >60 mL/min/1.73m2 >90   Calcium 8.8 - 10.2 mg/dL 9.2   Anion Gap 7 - 15 mmol/L 8   Glucose 70 - 99 mg/dL 107 (H)   (L): Data is abnormally low  (H): Data is abnormally high    CRT-P interrogation: 23  Device: Medtronic [id number] Percepta Quad CRT-P  Normal Device Function   Mode: DDD  bpm  AP: 46.7%  : 1.8%  Intrinsic Rhythm: SB @ 58 bpm  Short V-V intervals: 0  Estimated battery longevity to RRT = Initializing. Battery voltage = 3.20 V.       CMR 22: personally reviewed   Clinical history: 66 M myotonic dystrophy type 2, LVEF 45-50% on echo, 2.3% PVC burden on monitor  Comparison CMR: none     1. The LV is normal in cavity size and wall thickness. The global systolic function is mildly reduced.  The  LVEF is 46%. There are no regional wall motion abnormalities.     2. The RV is normal in cavity size. The global systolic function is mildly reduced. The RVEF is 47%.      3. Both atria are severely enlarged.     4. There is no significant valvular disease.      5. Late gadolinium enhancement imaging shows mid-myocardial enhancement in the septum. This is often seen  in non-ischemic DCM, and is not typical for dystrophin-related CM (typically involves posterior wall).  There is no myocardial edema.      6. Regadenoson stress perfusion imaging shows no ischemia.     7. There is no pericardial effusion or thickening.     8. There is no intracardiac thrombus.     CONCLUSIONS: Mild NICM, LVEF 46% and RVEF 47%. No myocardial ischemia, with mild non-ischemic fibrosis in  the septum.     ziopatch monitor demonstrated sinus with 2.3% PVCs, no VT, and brief AV Wenckebach      ECG 7/18/22: personally reviewed: sinus with PVC, bifascular block, QRS duration 146ms.     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 He has a history of atrial flutter and a mildly reduced LVEF and progressive bifasicular block.      1. 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 Mild non ischemic, biventricular systolic heart failure  3. History of presyncope  3. History of atrial flutter  4. Bifascicular block s/p CRT-P 5/2023  5. Wenckebach   6. Hypertension history     Overall, Mr. Lara continues to do well. He underwent CRT-P implantation with Dr. Hadley in May 2023. He is tolerating losartan 12.5mg daily and his recent labs show normal renal function and potassium. An mineralecorticoid receptor antagonists could be considered in the future due to his cardiac fibrosis  to slow the progression of scar tissue. He will have an echo when he sees Dr. Hadley,  which I will follow up on. I will continue to see him at least yearly.       PLAN:   -echo as ordered   -follow up with Dr. Hadley  -CRT-P interrogation   -annual visit which can be coordinated with the visit with Dr. Cano       60 minutes on DOS for chart review, visit,  counseling, review of diagnostic testing, coordination of care and documentation.     Thank you for allowing me to participate in the care of your patient. Please do not hesitate to contact me if you have any questions.     Sincerely,   Forum     Forum MD Daisha, PhD, [facility]  Advanced Heart Failure/Transplantation/MCS  [facility]    Virtual Visit Details    Type of service:  Video Visit   Video Start Time: 1:24  Video End Time: 1:48    Originating Location (pt. Location): Home   Distant Location (provider location):  On-site  Platform used for Video Visit: Gómez   The audio was not working towards the end of the visit

## 2023-08-14 NOTE — PROGRESS NOTES
Neurocardiomyopathy Clinic Progress Note    Name: Rajni Lara  : 1955  MRN: [id number]    2023    Dear Dr. Rodriguez and Dr. Santiago,     I had the pleasure of seeing Mr. Rajni Lara, a 67 year old man today in the [facility] for a a virtual follow up of myotonic dystrophy type 2 associated cardiomyopathy.     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 He also has a family history of myotonic dystrophy in his sister and also probably his mother and maternal grandfather. He saw Dr. Cano in [facility] clinic on 1/10/22. He had an echocardiogram in  and was incidentally idenitied to have atrial flutter which showed an LVEF of 40-45%. In  he had a successful cardioversion for atrial flutter. He had a echo in 2009 which showed an LVEF of 50-55% and normal RV function. He had a nuclear stress test in  which showed thinning at the inferior base and no ischemia. He has not had cardiovascular follow up since then.     I last saw him on  2023. He had a cardiac MRI which showed an LVEF of 52%, RVEF 51%, JEFFERSON, and mid myocardial enhancement in the septum, and no ischemia. His ziopatch monitor demonstrated sinus with 2.3% PVCs, no VT, and brief AV Wenckebach. He saw EP regarding bifasicular block and had a CRT-P implanted in May 2023.  He enjoyed the [employer] tournament though he did note that he had some difficulty due to leg weakness.  Overall he is feeling well.  He is not exercising currently but otherwise remains active.  Does note that he continues to have difficulty going up stairs which has been progressive.  He has no dyspnea or chest pain.  His weight is stable though he notes his weight has increased approximately 20 pounds since the pandemic.  He does not have any edema, orthopnea or PND.  He notes that he has had lightheadedness when he is outside and standing  in the sun and then he needs to lean on something to prevent himself from passing out.  For example this happened on Saturday when he was out in the yard with neighbors.  He has not had palpitations or he has not had syncope.  His appetite and energy levels remain preserved.     REVIEW OF SYSTEMS: 10 point ROS neg other than the symptoms noted above in the HPI.    PAST MEDICAL HISTORY:   1. Myotonic dystrophy type 2  2. History of atrial flutter s/p DCCV   3. History of non ischemic cardiomyopathy   4. BPH      ALLERGIES:    Allergies   Allergen Reactions    Sulfa Antibiotics Dermatitis, Headache and Photosensitivity       MEDICATIONS:   acetaminophen (TYLENOL) 500 MG tablet, Take 1,000 mg by mouth  acetaminophen-codeine (TYLENOL #3) 300-30 MG tablet, Take 1-2 tablets by mouth every 4 hours as needed for moderate to severe pain  finasteride (PROSCAR) 5 MG tablet, Take 1 tablet by mouth every morning  losartan (COZAAR) 25 MG tablet, Take 0.5 tablets (12.5 mg) by mouth daily  naproxen sodium 220 MG capsule, Take 440 mg by mouth as needed (as needed)  nystatin-triamcinolone (MYCOLOG) 600894-4.1 UNIT/GM-% external ointment, Apply topically 2 times daily  tamsulosin (FLOMAX) 0.4 MG capsule, TAKE ONE CAPSULE BY MOUTH AT BEDTIME    No current facility-administered medications on file prior to visit.    No ASA - delete from medication record     SOCIAL HISTORY: He is is pharmacist. He lives with his wife in [place], [place].   Tobacco: never  Alcohol: rarely   Illicits: now  His family is from [facility]     FAMILY HISTORY:  His family is from [place] [facility] [place]. He has multiple maternal family members with myotonic dystrophy.   Mom when 70 when falling and missing steps and in her 80s she was bedbound.   Sister is a physician and has MD2  Maternal grand father had a heart attack 59, and  and also had some leg weakness  Maternal uncle sudden death at age 64  Son and younger daughter tested negative. Older daughter is positive for  MD2      PHYSICAL EXAM:   GENERAL: Healthy, alert and no distress  EYES: Eyes grossly normal to inspection.  No discharge or erythema, or obvious scleral/conjunctival abnormalities.  RESP: No audible wheeze, cough, or visible cyanosis.  No visible retractions or increased work of breathing.    SKIN: Visible skin clear. No significant rash, abnormal pigmentation or lesions.  NEURO: Cranial nerves grossly intact.  Mentation and speech appropriate for age.  PSYCH: Mentation appears normal, affect normal/bright, judgement and insight intact, normal speech and appearance well-groomed.        DIAGNOSTIC TESTING:      Latest Reference Range & Units 05/01/23 06:51   Sodium 136 - 145 mmol/L 140   Potassium 3.4 - 5.3 mmol/L 4.6   Chloride 98 - 107 mmol/L 107   Carbon Dioxide (CO2) 22 - 29 mmol/L 25   Urea Nitrogen 8.0 - 23.0 mg/dL 21.4   Creatinine 0.67 - 1.17 mg/dL 0.53 (L)   GFR Estimate >60 mL/min/1.73m2 >90   Calcium 8.8 - 10.2 mg/dL 9.2   Anion Gap 7 - 15 mmol/L 8   Glucose 70 - 99 mg/dL 107 (H)   (L): Data is abnormally low  (H): Data is abnormally high    CRT-P interrogation: 5/1/23  Device: SiCortex [id number] Percepta Quad CRT-P  Normal Device Function   Mode: DDD  bpm  AP: 46.7%  : 1.8%  Intrinsic Rhythm: SB @ 58 bpm  Short V-V intervals: 0  Estimated battery longevity to RRT = Initializing. Battery voltage = 3.20 V.       CMR 7/18/22: personally reviewed   Clinical history: 66 M myotonic dystrophy type 2, LVEF 45-50% on echo, 2.3% PVC burden on monitor  Comparison CMR: none     1. The LV is normal in cavity size and wall thickness. The global systolic function is mildly reduced. The  LVEF is 46%. There are no regional wall motion abnormalities.     2. The RV is normal in cavity size. The global systolic function is mildly reduced. The RVEF is 47%.      3. Both atria are severely enlarged.     4. There is no significant valvular disease.      5. Late gadolinium enhancement imaging shows mid-myocardial  enhancement in the septum. This is often seen  in non-ischemic DCM, and is not typical for dystrophin-related CM (typically involves posterior wall).  There is no myocardial edema.      6. Regadenoson stress perfusion imaging shows no ischemia.     7. There is no pericardial effusion or thickening.     8. There is no intracardiac thrombus.     CONCLUSIONS: Mild NICM, LVEF 46% and RVEF 47%. No myocardial ischemia, with mild non-ischemic fibrosis in  the septum.     ziopatch monitor demonstrated sinus with 2.3% PVCs, no VT, and brief AV Wenckebach      ECG 7/18/22: personally reviewed: sinus with PVC, bifascular block, QRS duration 146ms.     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 He has a history of atrial flutter and a mildly reduced LVEF and progressive bifasicular block.      1. 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 Mild non ischemic, biventricular systolic heart failure  3. History of presyncope  3. History of atrial flutter  4. Bifascicular block s/p CRT-P 5/2023  5. Wenckebach   6. Hypertension history     Overall, Mr. Lara continues to do well. He underwent CRT-P implantation with Dr. Hadley in May 2023. He is tolerating losartan 12.5mg daily and his recent labs show normal renal function and potassium. An mineralecorticoid receptor antagonists could be considered in the future due to his cardiac fibrosis  to slow the progression of scar tissue. He will have an echo when he sees Dr. Hadley, which I will follow up on. I will continue to see him at least yearly.       PLAN:   -echo as ordered   -follow up with Dr. Hadley  -CRT-P interrogation   -annual visit which can be coordinated with the visit with Dr. Cano       60 minutes on DOS for chart review, visit,  counseling, review of diagnostic testing,  coordination of care and documentation.     Thank you for allowing me to participate in the care of your patient. Please do not hesitate to contact me if you have any questions.     Sincerely,   Forum     Forum MD Daisha, PhD, [facility]  Advanced Heart Failure/Transplantation/MCS  [facility]    Virtual Visit Details    Type of service:  Video Visit   Video Start Time: 1:24  Video End Time: 1:48    Originating Location (pt. Location): Home   Distant Location (provider location):  On-site  Platform used for Video Visit: Savor   The audio was not working towards the end of the visit

## 2023-08-14 NOTE — NURSING NOTE
Med Reconcile: Reviewed and verified all current medications with the patient. The updated medication list was printed and given to the patient.    Return Appointment: Patient given instructions regarding scheduling next clinic visit. Patient demonstrated understanding of this information and agreed to call with further questions or concerns. Follow up with Dr. Ashton in 6 months.    Patient stated he understood all health information given and agreed to call with further questions or concerns.    Joan Tolbert RN

## 2023-08-29 NOTE — PROGRESS NOTES
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     Myotonic Dystrophy Type II NICM LVEF 46%, NYHA II:  1. ACEi/ARB: Continue Losartan.  2. BB: Not currently on due to bradycardia and bifascicular block on ECG   3. Aldosterone antagonist: Not currently required.  4. SCD prophylaxis He hd a class IIa indication for pacing if AK>240 ms and QRS >140 ms, or if HV >70 ms on invasive testing. He had CRTP placed on 5/1/23. Device is programmed to be on demand BVP only and not forced BVP. Thus, we are not aiming for 100% BVP. Device site well healed, normal device function, stable lead parameters. Continue routine device clinic follow-up.   5. Fluid status: having some positional dizziness, recommended hydration.       Follow-up in 1 year with EP JOHN.        History of Present Illness/Subjective    Mr. Rajni Lara is a 67 year old male who comes in today for EP consultation of conduction delays in MD patient.     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     He has known gene positive myotonic dystrophy type 2. In 2008, he had an echo which showed LVEF 40-45% and he was found to have AFL. He had a DCCVs in 2009. No recurrences since. A follow-up echo showed LVEF 50-55% in sinus. NM stress test in 2009 which showed thinning at the inferior base and no ischemia.He was lost to Cardiology follow-up until he res established with Dr. Ashton. A CMR from 7/2022 showed LVEF 46%, JEFFERSON, and LGE mid myocardial enhancement in the septum, and no ischemia. His ziopatch monitor from, 4/25/22-5/1/22 demonstrated sinus with 2.3%  PVCs, no VT, and brief AV Wenckebach at 92bpm with sinus slowing prior. ECG shows bifascicular block and he was referred to EP for conduction delays in a myotonic dystrophy patient.     EP Visit 9/21/22: He reports feeling at baseline. He had an episode of syncope in 12/2020, after standing for a couple of minutes, he passed out while standing. He had some SOB after eating and drinking soda and has had some dizzy spells since. ECG has evolved from RBBB to bifascicular block from April to July, with SD prolonging from 200 to 224 ms range, along with widening of the QRS from around 120 ms when measured manually to >140 ms.. He denies chest discomfort, palpitations, abdominal fullness/bloating or peripheral edema, shortness of breath, paroxysmal nocturnal dyspnea, orthopnea, lightheadedness, dizziness, pre-syncope, or syncope. Presenting 12 lead ECG shows SB 1 AVB bifasicular block Vent Rate 55 bpm,  ms,  ms, QTc 476 ms. Current cardiac medications include: Losartan and ASA.     EP Visit 3/1/23: He presents today for follow up. He was agreeable to proceeding with CRTP, but wanted clinic appointment to discuss again first. QRS duration has progressed last 156 ms.  He reports feeling more tired over last couple months. He denies chest discomfort, palpitations, abdominal fullness/bloating or peripheral edema, shortness of breath, paroxysmal nocturnal dyspnea, orthopnea, lightheadedness, dizziness, pre-syncope, or syncope. A recent CMR showed low normal biventricular function. LV EF 52%. RVEF 51% minimal improvement from prior study (reviewed again and EF the same in the 45% range) . No significant valvular disease. Mild non-ischemic fibrosis in the septum unchanged from prior study. 12 lead ECG from 1/2023 shows SR   ms,  (increased prior to last visit) ms. Current cardiac medications include: Losartan and ASA.    He presents today for follow up. He had CRTP implant on 5/1/23. Device site well  healed. Echo today shows LVEF 45-50%, normal RV size/function, and no significant valvular abnormalitites. He reports feeling well overall. He has some lightheadedness mostly with standing. He denies chest discomfort, palpitations, abdominal fullness/bloating or peripheral edema, shortness of breath, paroxysmal nocturnal dyspnea, orthopnea, pre-syncope, or syncope. Presenting 12 lead ECG shows SB 1 AVB Vent Rate 54 bpm,  ms,  ms, QTc 455 ms. Device interrogation shows normal device function, stable lead parameters, no arrhythmias recorded, and , BVP 16.1% (set for on-demand pacing only). Current cardiac medications include: Losartan and ASA..       Family History:   He was diagnosed with MD earlier this year after his sister was diagnosed with it. His mother was bedridden for the last 7-8 years of her life,  at 87, and had very easy falls before that  Kids 2 tested negative and one tested positive  Brother tested negative  He has signs of wekaness, tough time going up steps and getting up chairs      I have reviewed and updated the patient's Past Medical History, Social History, Family History and Medication List.     Cardiographics (Personally Reviewed) :   23 Echo:  Interpretation Summary  Left ventricular function is decreased. The ejection fraction is 45-50%  (mildly reduced). Grade I or early diastolic dysfunction. Mild diffuse  hypokinesis is present.  Global right ventricular function is normal. The right ventricle is normal  size.  No significant valvular abnormalities.  The estimated PA systolic pressure is 35 mmHg.  IVC diameter >2.1 cm collapsing <50% with sniff suggests a high RA pressure  estimated at 15 mmHg or greater.  This study was compared with the study from 2022. The RA pressure is  higher. No significant changes in the biventricular function.    22 Echo:   Interpretation Summary  Left ventricular function is decreased. The ejection fraction is 45-50%  (mildly reduced).  The right ventricle is normal size.  Global right ventricular function is normal.  No significant valvular abnormalities were noted.  The inferior vena cava was normal in size with preserved respiratory variability.  No pericardial effusion is present.    7/18/22 CMR:  1. The LV is normal in cavity size and wall thickness. The global systolic function is mildly reduced. The LVEF is 46%. There are no regional wall motion abnormalities.  2. The RV is normal in cavity size. The global systolic function is mildly reduced. The RVEF is 47%.   3. Both atria are severely enlarged.  4. There is no significant valvular disease.   5. Late gadolinium enhancement imaging shows mid-myocardial enhancement in the septum. This is often seen in non-ischemic DCM, and is not typical for dystrophin-related CM (typically involves posterior wall). There is no myocardial edema.   6. Regadenoson stress perfusion imaging shows no ischemia.  7. There is no pericardial effusion or thickening.  8. There is no intracardiac thrombus.  CONCLUSIONS: Mild NICM, LVEF 46% and RVEF 47%. No myocardial ischemia, with mild non-ischemic fibrosis in  the septum.     2/20/23 CMR:  1. The LV is normal in cavity size and wall thickness. The global systolic function is low normal. The LVEF  is 52%. There are no regional wall motion abnormalities.  2. The RV is normal in cavity size. The global systolic function is low normal. The RVEF is 51%.   3. Mild right atrial enlargement. Left atria is normal in size.  4. There is no significant valvular disease.   5. Late gadolinium enhancement imaging shows small area of mid myocardial enhancement of the basal to mid  septum.   6. There is no pericardial effusion or thickening.  CONCLUSIONS: Low normal biventricular function. LV EF 52%. RVEF 51% minimal improvement from prior study.  No significant valvular disease. Mild non-ischemic fibrosis in the septum unchanged from prior study.        "    Physical Examination   /78 (BP Location: Left arm, Patient Position: Chair, Cuff Size: Adult Regular)   Pulse 50   Ht 1.916 m (6' 3.43\")   Wt 101.9 kg (224 lb 9.6 oz)   SpO2 99%   BMI 27.75 kg/m    Wt Readings from Last 3 Encounters:   05/01/23 103.8 kg (228 lb 13.4 oz)   01/23/23 101.2 kg (223 lb 3.2 oz)   01/23/23 101.2 kg (223 lb)   There were no vitals taken for this visit.    The rest of a comprehensive physical examination is deferred due to nature of video visit restrictions.  CONSITUTIONAL: no acute distress  HEENT: no icterus, no redness or discharge, neck supple  CV: no visible edema of visualized extremities. No JVD.   RESPIRATORY: respirations nonlabored, no cough  NEURO: AA&Ox3, speech fluent/appropriate, motor grossly nonfocal  PSYCH: cooperative, affect appropriate  DERM: no rashes on visualized face/neck/upper extremities         Medications  Allergies   Current Outpatient Medications   Medication Sig Dispense Refill    acetaminophen (TYLENOL) 500 MG tablet Take 1,000 mg by mouth      acetaminophen-codeine (TYLENOL #3) 300-30 MG tablet Take 1-2 tablets by mouth every 4 hours as needed for moderate to severe pain 10 tablet 0    finasteride (PROSCAR) 5 MG tablet Take 1 tablet by mouth every morning      losartan (COZAAR) 25 MG tablet Take 0.5 tablets (12.5 mg) by mouth daily 45 tablet 3    naproxen sodium 220 MG capsule Take 440 mg by mouth as needed (as needed)      nystatin-triamcinolone (MYCOLOG) 217009-9.1 UNIT/GM-% external ointment Apply topically 2 times daily      tamsulosin (FLOMAX) 0.4 MG capsule TAKE ONE CAPSULE BY MOUTH AT BEDTIME      Allergies   Allergen Reactions    Sulfa Antibiotics Dermatitis, Headache and Photosensitivity         Lab Results (Personally Reviewed)    Chemistry/lipid CBC Cardiac Enzymes/BNP/TSH/INR   Lab Results   Component Value Date    BUN 21.4 05/01/2023     05/01/2023    CO2 25 05/01/2023     Creatinine   Date Value Ref Range Status   05/01/2023 " 0.53 (L) 0.67 - 1.17 mg/dL Final       Lab Results   Component Value Date    CHOL 220 (H) 04/25/2022    HDL 63 04/25/2022     (H) 04/25/2022      Lab Results   Component Value Date    WBC 6.4 05/01/2023    HGB 14.7 05/01/2023    HCT 43.9 05/01/2023    MCV 95 05/01/2023     05/01/2023    Lab Results   Component Value Date    TSH 2.60 01/10/2022        The patient states understanding and is agreeable with the plan.   Jenaro Hadley MD [facility]  Cardiology - Electrophysiology      Total time spent on patient visit, reviewing notes, imaging, labs, orders, and completing necessary documentation: 30 minutes.  >50% of visit spent on counseling patient and/or coordination of care.

## 2023-08-30 ENCOUNTER — ANCILLARY PROCEDURE (OUTPATIENT)
Dept: CARDIOLOGY | Facility: CLINIC | Age: 68
End: 2023-08-30
Attending: INTERNAL MEDICINE
Payer: COMMERCIAL

## 2023-08-30 VITALS
SYSTOLIC BLOOD PRESSURE: 130 MMHG | OXYGEN SATURATION: 99 % | WEIGHT: 224.6 LBS | HEART RATE: 50 BPM | HEIGHT: 75 IN | BODY MASS INDEX: 27.93 KG/M2 | DIASTOLIC BLOOD PRESSURE: 78 MMHG

## 2023-08-30 DIAGNOSIS — I45.2 BIFASCICULAR BLOCK: Primary | ICD-10-CM

## 2023-08-30 DIAGNOSIS — I42.8 NONISCHEMIC CARDIOMYOPATHY (H): ICD-10-CM

## 2023-08-30 DIAGNOSIS — Z95.0 CARDIAC RESYNCHRONIZATION THERAPY PACEMAKER (CRT-P) IN PLACE: ICD-10-CM

## 2023-08-30 DIAGNOSIS — G71.11 MYOTONIC DYSTROPHY, TYPE 2 (H): ICD-10-CM

## 2023-08-30 LAB
ATRIAL RATE - MUSE: 54 BPM
DIASTOLIC BLOOD PRESSURE - MUSE: NORMAL MMHG
INTERPRETATION ECG - MUSE: NORMAL
LVEF ECHO: NORMAL
MDC_IDC_EPISODE_DTM: NORMAL
MDC_IDC_EPISODE_DURATION: 111 S
MDC_IDC_EPISODE_DURATION: 113 S
MDC_IDC_EPISODE_DURATION: 135 S
MDC_IDC_EPISODE_DURATION: 180 S
MDC_IDC_EPISODE_DURATION: 23 S
MDC_IDC_EPISODE_DURATION: 276 S
MDC_IDC_EPISODE_DURATION: 31 S
MDC_IDC_EPISODE_DURATION: 32 S
MDC_IDC_EPISODE_ID: 1
MDC_IDC_EPISODE_ID: 167
MDC_IDC_EPISODE_ID: 168
MDC_IDC_EPISODE_ID: 169
MDC_IDC_EPISODE_ID: 170
MDC_IDC_EPISODE_ID: 171
MDC_IDC_EPISODE_ID: NORMAL
MDC_IDC_EPISODE_TYPE: NORMAL
MDC_IDC_LEAD_IMPLANT_DT: NORMAL
MDC_IDC_LEAD_LOCATION: NORMAL
MDC_IDC_LEAD_LOCATION_DETAIL_1: NORMAL
MDC_IDC_LEAD_MFG: NORMAL
MDC_IDC_LEAD_MODEL: NORMAL
MDC_IDC_LEAD_POLARITY_TYPE: NORMAL
MDC_IDC_LEAD_SERIAL: NORMAL
MDC_IDC_LEAD_SPECIAL_FUNCTION: NORMAL
MDC_IDC_MSMT_BATTERY_DTM: NORMAL
MDC_IDC_MSMT_BATTERY_REMAINING_LONGEVITY: 162 MO
MDC_IDC_MSMT_BATTERY_RRT_TRIGGER: 2.6
MDC_IDC_MSMT_BATTERY_STATUS: NORMAL
MDC_IDC_MSMT_BATTERY_VOLTAGE: 3.2 V
MDC_IDC_MSMT_LEADCHNL_LV_IMPEDANCE_VALUE: 1178 OHM
MDC_IDC_MSMT_LEADCHNL_LV_IMPEDANCE_VALUE: 1197 OHM
MDC_IDC_MSMT_LEADCHNL_LV_IMPEDANCE_VALUE: 1273 OHM
MDC_IDC_MSMT_LEADCHNL_LV_IMPEDANCE_VALUE: 342 OHM
MDC_IDC_MSMT_LEADCHNL_LV_IMPEDANCE_VALUE: 418 OHM
MDC_IDC_MSMT_LEADCHNL_LV_IMPEDANCE_VALUE: 627 OHM
MDC_IDC_MSMT_LEADCHNL_LV_IMPEDANCE_VALUE: 627 OHM
MDC_IDC_MSMT_LEADCHNL_LV_IMPEDANCE_VALUE: 874 OHM
MDC_IDC_MSMT_LEADCHNL_LV_IMPEDANCE_VALUE: 874 OHM
MDC_IDC_MSMT_LEADCHNL_LV_IMPEDANCE_VALUE: 950 OHM
MDC_IDC_MSMT_LEADCHNL_LV_PACING_THRESHOLD_AMPLITUDE: 1.25 V
MDC_IDC_MSMT_LEADCHNL_LV_PACING_THRESHOLD_PULSEWIDTH: 0.8 MS
MDC_IDC_MSMT_LEADCHNL_RA_IMPEDANCE_VALUE: 342 OHM
MDC_IDC_MSMT_LEADCHNL_RA_IMPEDANCE_VALUE: 532 OHM
MDC_IDC_MSMT_LEADCHNL_RA_PACING_THRESHOLD_AMPLITUDE: 0.75 V
MDC_IDC_MSMT_LEADCHNL_RA_PACING_THRESHOLD_PULSEWIDTH: 0.4 MS
MDC_IDC_MSMT_LEADCHNL_RA_SENSING_INTR_AMPL: 1.4 MV
MDC_IDC_MSMT_LEADCHNL_RV_IMPEDANCE_VALUE: 361 OHM
MDC_IDC_MSMT_LEADCHNL_RV_IMPEDANCE_VALUE: 437 OHM
MDC_IDC_MSMT_LEADCHNL_RV_PACING_THRESHOLD_AMPLITUDE: 0.75 V
MDC_IDC_MSMT_LEADCHNL_RV_PACING_THRESHOLD_PULSEWIDTH: 0.4 MS
MDC_IDC_MSMT_LEADCHNL_RV_SENSING_INTR_AMPL: 15.6 MV
MDC_IDC_PG_IMPLANT_DTM: NORMAL
MDC_IDC_PG_MFG: NORMAL
MDC_IDC_PG_MODEL: NORMAL
MDC_IDC_PG_SERIAL: NORMAL
MDC_IDC_PG_TYPE: NORMAL
MDC_IDC_SESS_CLINIC_NAME: NORMAL
MDC_IDC_SESS_DTM: NORMAL
MDC_IDC_SESS_TYPE: NORMAL
MDC_IDC_SET_BRADY_AT_MODE_SWITCH_RATE: 171 {BEATS}/MIN
MDC_IDC_SET_BRADY_LOWRATE: 50 {BEATS}/MIN
MDC_IDC_SET_BRADY_MAX_SENSOR_RATE: 130 {BEATS}/MIN
MDC_IDC_SET_BRADY_MAX_TRACKING_RATE: 130 {BEATS}/MIN
MDC_IDC_SET_BRADY_MODE: NORMAL
MDC_IDC_SET_BRADY_PAV_DELAY_HIGH: 100 MS
MDC_IDC_SET_BRADY_PAV_DELAY_LOW: 350 MS
MDC_IDC_SET_BRADY_SAV_DELAY_HIGH: 70 MS
MDC_IDC_SET_BRADY_SAV_DELAY_LOW: 300 MS
MDC_IDC_SET_CRT_LVRV_DELAY: 0 MS
MDC_IDC_SET_CRT_PACED_CHAMBERS: NORMAL
MDC_IDC_SET_LEADCHNL_LV_PACING_AMPLITUDE: 2 V
MDC_IDC_SET_LEADCHNL_LV_PACING_ANODE_ELECTRODE_1: NORMAL
MDC_IDC_SET_LEADCHNL_LV_PACING_ANODE_LOCATION_1: NORMAL
MDC_IDC_SET_LEADCHNL_LV_PACING_CAPTURE_MODE: NORMAL
MDC_IDC_SET_LEADCHNL_LV_PACING_CATHODE_ELECTRODE_1: NORMAL
MDC_IDC_SET_LEADCHNL_LV_PACING_CATHODE_LOCATION_1: NORMAL
MDC_IDC_SET_LEADCHNL_LV_PACING_POLARITY: NORMAL
MDC_IDC_SET_LEADCHNL_LV_PACING_PULSEWIDTH: 0.8 MS
MDC_IDC_SET_LEADCHNL_RA_PACING_AMPLITUDE: 1.5 V
MDC_IDC_SET_LEADCHNL_RA_PACING_ANODE_ELECTRODE_1: NORMAL
MDC_IDC_SET_LEADCHNL_RA_PACING_ANODE_LOCATION_1: NORMAL
MDC_IDC_SET_LEADCHNL_RA_PACING_CAPTURE_MODE: NORMAL
MDC_IDC_SET_LEADCHNL_RA_PACING_CATHODE_ELECTRODE_1: NORMAL
MDC_IDC_SET_LEADCHNL_RA_PACING_CATHODE_LOCATION_1: NORMAL
MDC_IDC_SET_LEADCHNL_RA_PACING_POLARITY: NORMAL
MDC_IDC_SET_LEADCHNL_RA_PACING_PULSEWIDTH: 0.4 MS
MDC_IDC_SET_LEADCHNL_RA_SENSING_ANODE_ELECTRODE_1: NORMAL
MDC_IDC_SET_LEADCHNL_RA_SENSING_ANODE_LOCATION_1: NORMAL
MDC_IDC_SET_LEADCHNL_RA_SENSING_CATHODE_ELECTRODE_1: NORMAL
MDC_IDC_SET_LEADCHNL_RA_SENSING_CATHODE_LOCATION_1: NORMAL
MDC_IDC_SET_LEADCHNL_RA_SENSING_POLARITY: NORMAL
MDC_IDC_SET_LEADCHNL_RA_SENSING_SENSITIVITY: 0.3 MV
MDC_IDC_SET_LEADCHNL_RV_PACING_AMPLITUDE: 2 V
MDC_IDC_SET_LEADCHNL_RV_PACING_ANODE_ELECTRODE_1: NORMAL
MDC_IDC_SET_LEADCHNL_RV_PACING_ANODE_LOCATION_1: NORMAL
MDC_IDC_SET_LEADCHNL_RV_PACING_CAPTURE_MODE: NORMAL
MDC_IDC_SET_LEADCHNL_RV_PACING_CATHODE_ELECTRODE_1: NORMAL
MDC_IDC_SET_LEADCHNL_RV_PACING_CATHODE_LOCATION_1: NORMAL
MDC_IDC_SET_LEADCHNL_RV_PACING_POLARITY: NORMAL
MDC_IDC_SET_LEADCHNL_RV_PACING_PULSEWIDTH: 0.4 MS
MDC_IDC_SET_LEADCHNL_RV_SENSING_ANODE_ELECTRODE_1: NORMAL
MDC_IDC_SET_LEADCHNL_RV_SENSING_ANODE_LOCATION_1: NORMAL
MDC_IDC_SET_LEADCHNL_RV_SENSING_CATHODE_ELECTRODE_1: NORMAL
MDC_IDC_SET_LEADCHNL_RV_SENSING_CATHODE_LOCATION_1: NORMAL
MDC_IDC_SET_LEADCHNL_RV_SENSING_POLARITY: NORMAL
MDC_IDC_SET_LEADCHNL_RV_SENSING_SENSITIVITY: 0.9 MV
MDC_IDC_SET_ZONE_DETECTION_INTERVAL: 350 MS
MDC_IDC_SET_ZONE_DETECTION_INTERVAL: 400 MS
MDC_IDC_SET_ZONE_TYPE: NORMAL
MDC_IDC_STAT_AT_BURDEN_PERCENT: 0 %
MDC_IDC_STAT_AT_DTM_END: NORMAL
MDC_IDC_STAT_AT_DTM_START: NORMAL
MDC_IDC_STAT_BRADY_AP_VP_PERCENT: 1.67 %
MDC_IDC_STAT_BRADY_AP_VS_PERCENT: 9.75 %
MDC_IDC_STAT_BRADY_AS_VP_PERCENT: 14.45 %
MDC_IDC_STAT_BRADY_AS_VS_PERCENT: 74.13 %
MDC_IDC_STAT_BRADY_DTM_END: NORMAL
MDC_IDC_STAT_BRADY_DTM_START: NORMAL
MDC_IDC_STAT_BRADY_RA_PERCENT_PACED: 12.04 %
MDC_IDC_STAT_BRADY_RV_PERCENT_PACED: 16.12 %
MDC_IDC_STAT_CRT_DTM_END: NORMAL
MDC_IDC_STAT_CRT_DTM_START: NORMAL
MDC_IDC_STAT_CRT_LV_PERCENT_PACED: 16.06 %
MDC_IDC_STAT_CRT_PERCENT_PACED: 16.06 %
MDC_IDC_STAT_EPISODE_RECENT_COUNT: 0
MDC_IDC_STAT_EPISODE_RECENT_COUNT_DTM_END: NORMAL
MDC_IDC_STAT_EPISODE_RECENT_COUNT_DTM_START: NORMAL
MDC_IDC_STAT_EPISODE_TOTAL_COUNT: 0
MDC_IDC_STAT_EPISODE_TOTAL_COUNT_DTM_END: NORMAL
MDC_IDC_STAT_EPISODE_TOTAL_COUNT_DTM_START: NORMAL
MDC_IDC_STAT_EPISODE_TYPE: NORMAL
P AXIS - MUSE: 71 DEGREES
PR INTERVAL - MUSE: 220 MS
QRS DURATION - MUSE: 148 MS
QT - MUSE: 480 MS
QTC - MUSE: 455 MS
R AXIS - MUSE: -70 DEGREES
SYSTOLIC BLOOD PRESSURE - MUSE: NORMAL MMHG
T AXIS - MUSE: 96 DEGREES
VENTRICULAR RATE- MUSE: 54 BPM

## 2023-08-30 PROCEDURE — 93281 PM DEVICE PROGR EVAL MULTI: CPT | Performed by: INTERNAL MEDICINE

## 2023-08-30 PROCEDURE — 99207 PR STATISTIC IV PUSH SINGLE INITIAL SUBSTANCE: CPT | Performed by: STUDENT IN AN ORGANIZED HEALTH CARE EDUCATION/TRAINING PROGRAM

## 2023-08-30 PROCEDURE — 93010 ELECTROCARDIOGRAM REPORT: CPT | Mod: XU | Performed by: INTERNAL MEDICINE

## 2023-08-30 PROCEDURE — 99214 OFFICE O/P EST MOD 30 MIN: CPT | Mod: 25 | Performed by: INTERNAL MEDICINE

## 2023-08-30 PROCEDURE — 93005 ELECTROCARDIOGRAM TRACING: CPT

## 2023-08-30 PROCEDURE — G0463 HOSPITAL OUTPT CLINIC VISIT: HCPCS | Performed by: INTERNAL MEDICINE

## 2023-08-30 PROCEDURE — 93306 TTE W/DOPPLER COMPLETE: CPT | Performed by: STUDENT IN AN ORGANIZED HEALTH CARE EDUCATION/TRAINING PROGRAM

## 2023-08-30 RX ADMIN — Medication 5 ML: at 13:54

## 2023-08-30 ASSESSMENT — PAIN SCALES - GENERAL: PAINLEVEL: NO PAIN (0)

## 2023-08-30 NOTE — PATIENT INSTRUCTIONS
It was a pleasure to see you in clinic today.  Please do not hesitate to call with any questions or concerns.  We look forward to seeing you in clinic at your next device check.    Gracy Engel, RN, BSN  Electrophysiology Nurse Clinician  [facility]    During Business Hours Please Call:  [phone]  After Hours Please Call:  [phone] - select option #[phone] and ask for job code [phone]

## 2023-08-30 NOTE — NURSING NOTE
Chief Complaint   Patient presents with    Follow Up     3 mo follow-up CRTP implant.         Vitals were taken, medications reconciled.    Radha Ramires, Facilitator   2:55 PM  Vitals were performed. Medications reconciled. EKG was performed.   Radha Ramires - Visit Facilitator

## 2023-08-30 NOTE — LETTER
8/30/2023      RE: Rajni Lara  [address] [facility] [address]       Dear Colleague,    Thank you for the opportunity to participate in the care of your patient, Rajni Lara, at the [facility] [place] at [facility]. Please see a copy of my visit note below.          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     Myotonic Dystrophy Type II NICM LVEF 46%, NYHA II:  1. ACEi/ARB: Continue Losartan.  2. BB: Not currently on due to bradycardia and bifascicular block on ECG   3. Aldosterone antagonist: Not currently required.  4. SCD prophylaxis He hd a class IIa indication for pacing if KS>240 ms and QRS >140 ms, or if HV >70 ms on invasive testing. He had CRTP placed on 5/1/23. Device is programmed to be on demand BVP only and not forced BVP. Thus, we are not aiming for 100% BVP. Device site well healed, normal device function, stable lead parameters. Continue routine device clinic follow-up.   5. Fluid status: having some positional dizziness, recommended hydration.       Follow-up in 1 year with EP JONH.        History of Present Illness/Subjective    Mr. Rajni Lara is a 67 year old male who comes in today for EP consultation of conduction delays in MD patient.     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     He has known gene positive myotonic dystrophy type 2. In 2008, he had an echo which showed LVEF 40-45% and he was found to have AFL. He had a DCCVs in 2009. No recurrences since. A follow-up echo showed LVEF 50-55% in sinus. NM stress  test in 2009 which showed thinning at the inferior base and no ischemia.He was lost to Cardiology follow-up until he res established with Dr. Ashton. A CMR from 7/2022 showed LVEF 46%, JEFFERSON, and LGE mid myocardial enhancement in the septum, and no ischemia. His ziopatch monitor from, 4/25/22-5/1/22 demonstrated sinus with 2.3% PVCs, no VT, and brief AV Wenckebach at 92bpm with sinus slowing prior. ECG shows bifascicular block and he was referred to EP for conduction delays in a myotonic dystrophy patient.     EP Visit 9/21/22: He reports feeling at baseline. He had an episode of syncope in 12/2020, after standing for a couple of minutes, he passed out while standing. He had some SOB after eating and drinking soda and has had some dizzy spells since. ECG has evolved from RBBB to bifascicular block from April to July, with [place] prolonging from 200 to 224 ms range, along with widening of the QRS from around 120 ms when measured manually to >140 ms.. He denies chest discomfort, palpitations, abdominal fullness/bloating or peripheral edema, shortness of breath, paroxysmal nocturnal dyspnea, orthopnea, lightheadedness, dizziness, pre-syncope, or syncope. Presenting 12 lead ECG shows SB 1 AVB bifasicular block Vent Rate 55 bpm,  ms,  ms, QTc 476 ms. Current cardiac medications include: Losartan and ASA.     EP Visit 3/1/23: He presents today for follow up. He was agreeable to proceeding with CRTP, but wanted clinic appointment to discuss again first. QRS duration has progressed last 156 ms.  He reports feeling more tired over last couple months. He denies chest discomfort, palpitations, abdominal fullness/bloating or peripheral edema, shortness of breath, paroxysmal nocturnal dyspnea, orthopnea, lightheadedness, dizziness, pre-syncope, or syncope. A recent CMR showed low normal biventricular function. LV EF 52%. RVEF 51% minimal improvement from prior study (reviewed again and EF the same in the 45% range) . No  significant valvular disease. Mild non-ischemic fibrosis in the septum unchanged from prior study. 12 lead ECG from 2023 shows SR   ms,  (increased prior to last visit) ms. Current cardiac medications include: Losartan and ASA.    He presents today for follow up. He had CRTP implant on 23. Device site well healed. Echo today shows LVEF 45-50%, normal RV size/function, and no significant valvular abnormalitites. He reports feeling well overall. He has some lightheadedness mostly with standing. He denies chest discomfort, palpitations, abdominal fullness/bloating or peripheral edema, shortness of breath, paroxysmal nocturnal dyspnea, orthopnea, pre-syncope, or syncope. Presenting 12 lead ECG shows SB 1 AVB Vent Rate 54 bpm,  ms,  ms, QTc 455 ms. Device interrogation shows normal device function, stable lead parameters, no arrhythmias recorded, and , BVP 16.1% (set for on-demand pacing only). Current cardiac medications include: Losartan and ASA..       Family History:   He was diagnosed with MD earlier this year after his sister was diagnosed with it. His mother was bedridden for the last 7-8 years of her life,  at 87, and had very easy falls before that  Kids 2 tested negative and one tested positive  Brother tested negative  He has signs of wekaness, tough time going up steps and getting up chairs      I have reviewed and updated the patient's Past Medical History, Social History, Family History and Medication List.     Cardiographics (Personally Reviewed) :   23 Echo:  Interpretation Summary  Left ventricular function is decreased. The ejection fraction is 45-50%  (mildly reduced). Grade I or early diastolic dysfunction. Mild diffuse  hypokinesis is present.  Global right ventricular function is normal. The right ventricle is normal  size.  No significant valvular abnormalities.  The estimated PA systolic pressure is 35 mmHg.  IVC diameter >2.1 cm collapsing <50% with  sniff suggests a high RA pressure  estimated at 15 mmHg or greater.  This study was compared with the study from 05/02/2022. The RA pressure is  higher. No significant changes in the biventricular function.    5/2/22 Echo:   Interpretation Summary  Left ventricular function is decreased. The ejection fraction is 45-50% (mildly reduced).  The right ventricle is normal size.  Global right ventricular function is normal.  No significant valvular abnormalities were noted.  The inferior vena cava was normal in size with preserved respiratory variability.  No pericardial effusion is present.    7/18/22 CMR:  1. The LV is normal in cavity size and wall thickness. The global systolic function is mildly reduced. The LVEF is 46%. There are no regional wall motion abnormalities.  2. The RV is normal in cavity size. The global systolic function is mildly reduced. The RVEF is 47%.   3. Both atria are severely enlarged.  4. There is no significant valvular disease.   5. Late gadolinium enhancement imaging shows mid-myocardial enhancement in the septum. This is often seen in non-ischemic DCM, and is not typical for dystrophin-related CM (typically involves posterior wall). There is no myocardial edema.   6. Regadenoson stress perfusion imaging shows no ischemia.  7. There is no pericardial effusion or thickening.  8. There is no intracardiac thrombus.  CONCLUSIONS: Mild NICM, LVEF 46% and RVEF 47%. No myocardial ischemia, with mild non-ischemic fibrosis in  the septum.     2/20/23 CMR:  1. The LV is normal in cavity size and wall thickness. The global systolic function is low normal. The LVEF  is 52%. There are no regional wall motion abnormalities.  2. The RV is normal in cavity size. The global systolic function is low normal. The RVEF is 51%.   3. Mild right atrial enlargement. Left atria is normal in size.  4. There is no significant valvular disease.   5. Late gadolinium enhancement imaging shows small area of mid myocardial  "enhancement of the basal to mid  septum.   6. There is no pericardial effusion or thickening.  CONCLUSIONS: Low normal biventricular function. LV EF 52%. RVEF 51% minimal improvement from prior study.  No significant valvular disease. Mild non-ischemic fibrosis in the septum unchanged from prior study.           Physical Examination   /78 (BP Location: Left arm, Patient Position: Chair, Cuff Size: Adult Regular)   Pulse 50   Ht 1.916 m (6' 3.43\")   Wt 101.9 kg (224 lb 9.6 oz)   SpO2 99%   BMI 27.75 kg/m    Wt Readings from Last 3 Encounters:   05/01/23 103.8 kg (228 lb 13.4 oz)   01/23/23 101.2 kg (223 lb 3.2 oz)   01/23/23 101.2 kg (223 lb)   There were no vitals taken for this visit.    The rest of a comprehensive physical examination is deferred due to nature of video visit restrictions.  CONSITUTIONAL: no acute distress  HEENT: no icterus, no redness or discharge, neck supple  CV: no visible edema of visualized extremities. No JVD.   RESPIRATORY: respirations nonlabored, no cough  NEURO: AA&Ox3, speech fluent/appropriate, motor grossly nonfocal  PSYCH: cooperative, affect appropriate  DERM: no rashes on visualized face/neck/upper extremities         Medications  Allergies   Current Outpatient Medications   Medication Sig Dispense Refill     acetaminophen (TYLENOL) 500 MG tablet Take 1,000 mg by mouth       acetaminophen-codeine (TYLENOL #3) 300-30 MG tablet Take 1-2 tablets by mouth every 4 hours as needed for moderate to severe pain 10 tablet 0     finasteride (PROSCAR) 5 MG tablet Take 1 tablet by mouth every morning       losartan (COZAAR) 25 MG tablet Take 0.5 tablets (12.5 mg) by mouth daily 45 tablet 3     naproxen sodium 220 MG capsule Take 440 mg by mouth as needed (as needed)       nystatin-triamcinolone (MYCOLOG) 286428-9.1 UNIT/GM-% external ointment Apply topically 2 times daily       tamsulosin (FLOMAX) 0.4 MG capsule TAKE ONE CAPSULE BY MOUTH AT BEDTIME      Allergies   Allergen " Reactions     Sulfa Antibiotics Dermatitis, Headache and Photosensitivity         Lab Results (Personally Reviewed)    Chemistry/lipid CBC Cardiac Enzymes/BNP/TSH/INR   Lab Results   Component Value Date    BUN 21.4 05/01/2023     05/01/2023    CO2 25 05/01/2023     Creatinine   Date Value Ref Range Status   05/01/2023 0.53 (L) 0.67 - 1.17 mg/dL Final       Lab Results   Component Value Date    CHOL 220 (H) 04/25/2022    HDL 63 04/25/2022     (H) 04/25/2022      Lab Results   Component Value Date    WBC 6.4 05/01/2023    HGB 14.7 05/01/2023    HCT 43.9 05/01/2023    MCV 95 05/01/2023     05/01/2023    Lab Results   Component Value Date    TSH 2.60 01/10/2022        The patient states understanding and is agreeable with the plan.   Jenaro Hadley MD [facility]  Cardiology - Electrophysiology      Total time spent on patient visit, reviewing notes, imaging, labs, orders, and completing necessary documentation: 30 minutes.  >50% of visit spent on counseling patient and/or coordination of care.                 Please do not hesitate to contact me if you have any questions/concerns.     Sincerely,     Jenaro Hadley MD

## 2023-08-30 NOTE — PATIENT INSTRUCTIONS
Plan:    Follow-up 1 year with EP JONH with device check prior      Your Care Team:  EP Cardiology   Telephone Number     Carolina Nettles RN [phone]    After business hours: [phone], ask for cardiologist on-call   Non-procedure scheduling:    Lazara SWANSON   [phone]   Procedure scheduling:    Eloise Renae   [phone]   Device Clinic (Pacemakers, ICDs, Loop Recorders)    During business hours: [phone]  After business hours:   [phone]- select option 4 and ask for job code [id number].       Cardiovascular Clinic:   [address] [place] [address]. [place] [address]      As always, thank you for trusting us with your health care needs!

## 2023-11-02 ENCOUNTER — TELEPHONE (OUTPATIENT)
Dept: CARDIOLOGY | Facility: CLINIC | Age: 68
End: 2023-11-02
Payer: COMMERCIAL

## 2023-11-30 ENCOUNTER — ANCILLARY PROCEDURE (OUTPATIENT)
Dept: CARDIOLOGY | Facility: CLINIC | Age: 68
End: 2023-11-30
Attending: INTERNAL MEDICINE
Payer: COMMERCIAL

## 2023-11-30 DIAGNOSIS — Z95.0 CARDIAC RESYNCHRONIZATION THERAPY PACEMAKER (CRT-P) IN PLACE: ICD-10-CM

## 2023-11-30 PROCEDURE — 93296 REM INTERROG EVL PM/IDS: CPT

## 2023-11-30 PROCEDURE — 93294 REM INTERROG EVL PM/LDLS PM: CPT | Performed by: INTERNAL MEDICINE

## 2023-12-18 LAB
MDC_IDC_EPISODE_DTM: NORMAL
MDC_IDC_EPISODE_DURATION: 111 S
MDC_IDC_EPISODE_DURATION: 115 S
MDC_IDC_EPISODE_DURATION: 122 S
MDC_IDC_EPISODE_DURATION: 149 S
MDC_IDC_EPISODE_DURATION: 23 S
MDC_IDC_EPISODE_DURATION: 36 S
MDC_IDC_EPISODE_DURATION: 46 S
MDC_IDC_EPISODE_DURATION: 71 S
MDC_IDC_EPISODE_DURATION: 76 S
MDC_IDC_EPISODE_DURATION: 94 S
MDC_IDC_EPISODE_ID: 2
MDC_IDC_EPISODE_ID: 3
MDC_IDC_EPISODE_ID: 310
MDC_IDC_EPISODE_ID: 311
MDC_IDC_EPISODE_ID: 312
MDC_IDC_EPISODE_ID: 313
MDC_IDC_EPISODE_ID: 314
MDC_IDC_EPISODE_ID: 4
MDC_IDC_EPISODE_ID: 5
MDC_IDC_EPISODE_ID: NORMAL
MDC_IDC_EPISODE_TYPE: NORMAL
MDC_IDC_LEAD_CONNECTION_STATUS: NORMAL
MDC_IDC_LEAD_IMPLANT_DT: NORMAL
MDC_IDC_LEAD_LOCATION: NORMAL
MDC_IDC_LEAD_LOCATION_DETAIL_1: NORMAL
MDC_IDC_LEAD_MFG: NORMAL
MDC_IDC_LEAD_MODEL: NORMAL
MDC_IDC_LEAD_POLARITY_TYPE: NORMAL
MDC_IDC_LEAD_SERIAL: NORMAL
MDC_IDC_LEAD_SPECIAL_FUNCTION: NORMAL
MDC_IDC_MSMT_BATTERY_DTM: NORMAL
MDC_IDC_MSMT_BATTERY_REMAINING_LONGEVITY: 160 MO
MDC_IDC_MSMT_BATTERY_RRT_TRIGGER: 2.6
MDC_IDC_MSMT_BATTERY_STATUS: NORMAL
MDC_IDC_MSMT_BATTERY_VOLTAGE: 3.16 V
MDC_IDC_MSMT_LEADCHNL_LV_IMPEDANCE_VALUE: 1140 OHM
MDC_IDC_MSMT_LEADCHNL_LV_IMPEDANCE_VALUE: 1273 OHM
MDC_IDC_MSMT_LEADCHNL_LV_IMPEDANCE_VALUE: 1292 OHM
MDC_IDC_MSMT_LEADCHNL_LV_IMPEDANCE_VALUE: 456 OHM
MDC_IDC_MSMT_LEADCHNL_LV_IMPEDANCE_VALUE: 475 OHM
MDC_IDC_MSMT_LEADCHNL_LV_IMPEDANCE_VALUE: 608 OHM
MDC_IDC_MSMT_LEADCHNL_LV_IMPEDANCE_VALUE: 798 OHM
MDC_IDC_MSMT_LEADCHNL_LV_IMPEDANCE_VALUE: 950 OHM
MDC_IDC_MSMT_LEADCHNL_LV_IMPEDANCE_VALUE: 950 OHM
MDC_IDC_MSMT_LEADCHNL_LV_IMPEDANCE_VALUE: 969 OHM
MDC_IDC_MSMT_LEADCHNL_LV_PACING_THRESHOLD_AMPLITUDE: 1.38 V
MDC_IDC_MSMT_LEADCHNL_LV_PACING_THRESHOLD_PULSEWIDTH: 0.8 MS
MDC_IDC_MSMT_LEADCHNL_RA_IMPEDANCE_VALUE: 342 OHM
MDC_IDC_MSMT_LEADCHNL_RA_IMPEDANCE_VALUE: 456 OHM
MDC_IDC_MSMT_LEADCHNL_RA_PACING_THRESHOLD_AMPLITUDE: 0.5 V
MDC_IDC_MSMT_LEADCHNL_RA_PACING_THRESHOLD_PULSEWIDTH: 0.4 MS
MDC_IDC_MSMT_LEADCHNL_RA_SENSING_INTR_AMPL: 3.62 MV
MDC_IDC_MSMT_LEADCHNL_RA_SENSING_INTR_AMPL: 3.62 MV
MDC_IDC_MSMT_LEADCHNL_RV_IMPEDANCE_VALUE: 361 OHM
MDC_IDC_MSMT_LEADCHNL_RV_IMPEDANCE_VALUE: 418 OHM
MDC_IDC_MSMT_LEADCHNL_RV_PACING_THRESHOLD_AMPLITUDE: 0.62 V
MDC_IDC_MSMT_LEADCHNL_RV_PACING_THRESHOLD_PULSEWIDTH: 0.4 MS
MDC_IDC_MSMT_LEADCHNL_RV_SENSING_INTR_AMPL: 14.25 MV
MDC_IDC_MSMT_LEADCHNL_RV_SENSING_INTR_AMPL: 14.25 MV
MDC_IDC_PG_IMPLANT_DTM: NORMAL
MDC_IDC_PG_MFG: NORMAL
MDC_IDC_PG_MODEL: NORMAL
MDC_IDC_PG_SERIAL: NORMAL
MDC_IDC_PG_TYPE: NORMAL
MDC_IDC_SESS_CLINIC_NAME: NORMAL
MDC_IDC_SESS_DTM: NORMAL
MDC_IDC_SESS_TYPE: NORMAL
MDC_IDC_SET_BRADY_AT_MODE_SWITCH_RATE: 171 {BEATS}/MIN
MDC_IDC_SET_BRADY_LOWRATE: 50 {BEATS}/MIN
MDC_IDC_SET_BRADY_MAX_SENSOR_RATE: 130 {BEATS}/MIN
MDC_IDC_SET_BRADY_MAX_TRACKING_RATE: 130 {BEATS}/MIN
MDC_IDC_SET_BRADY_MODE: NORMAL
MDC_IDC_SET_BRADY_PAV_DELAY_HIGH: 100 MS
MDC_IDC_SET_BRADY_PAV_DELAY_LOW: 350 MS
MDC_IDC_SET_BRADY_SAV_DELAY_HIGH: 70 MS
MDC_IDC_SET_BRADY_SAV_DELAY_LOW: 300 MS
MDC_IDC_SET_CRT_LVRV_DELAY: 0 MS
MDC_IDC_SET_CRT_PACED_CHAMBERS: NORMAL
MDC_IDC_SET_LEADCHNL_LV_PACING_AMPLITUDE: 2 V
MDC_IDC_SET_LEADCHNL_LV_PACING_ANODE_ELECTRODE_1: NORMAL
MDC_IDC_SET_LEADCHNL_LV_PACING_ANODE_LOCATION_1: NORMAL
MDC_IDC_SET_LEADCHNL_LV_PACING_CAPTURE_MODE: NORMAL
MDC_IDC_SET_LEADCHNL_LV_PACING_CATHODE_ELECTRODE_1: NORMAL
MDC_IDC_SET_LEADCHNL_LV_PACING_CATHODE_LOCATION_1: NORMAL
MDC_IDC_SET_LEADCHNL_LV_PACING_POLARITY: NORMAL
MDC_IDC_SET_LEADCHNL_LV_PACING_PULSEWIDTH: 0.8 MS
MDC_IDC_SET_LEADCHNL_RA_PACING_AMPLITUDE: 1.5 V
MDC_IDC_SET_LEADCHNL_RA_PACING_ANODE_ELECTRODE_1: NORMAL
MDC_IDC_SET_LEADCHNL_RA_PACING_ANODE_LOCATION_1: NORMAL
MDC_IDC_SET_LEADCHNL_RA_PACING_CAPTURE_MODE: NORMAL
MDC_IDC_SET_LEADCHNL_RA_PACING_CATHODE_ELECTRODE_1: NORMAL
MDC_IDC_SET_LEADCHNL_RA_PACING_CATHODE_LOCATION_1: NORMAL
MDC_IDC_SET_LEADCHNL_RA_PACING_POLARITY: NORMAL
MDC_IDC_SET_LEADCHNL_RA_PACING_PULSEWIDTH: 0.4 MS
MDC_IDC_SET_LEADCHNL_RA_SENSING_ANODE_ELECTRODE_1: NORMAL
MDC_IDC_SET_LEADCHNL_RA_SENSING_ANODE_LOCATION_1: NORMAL
MDC_IDC_SET_LEADCHNL_RA_SENSING_CATHODE_ELECTRODE_1: NORMAL
MDC_IDC_SET_LEADCHNL_RA_SENSING_CATHODE_LOCATION_1: NORMAL
MDC_IDC_SET_LEADCHNL_RA_SENSING_POLARITY: NORMAL
MDC_IDC_SET_LEADCHNL_RA_SENSING_SENSITIVITY: 0.3 MV
MDC_IDC_SET_LEADCHNL_RV_PACING_AMPLITUDE: 2 V
MDC_IDC_SET_LEADCHNL_RV_PACING_ANODE_ELECTRODE_1: NORMAL
MDC_IDC_SET_LEADCHNL_RV_PACING_ANODE_LOCATION_1: NORMAL
MDC_IDC_SET_LEADCHNL_RV_PACING_CAPTURE_MODE: NORMAL
MDC_IDC_SET_LEADCHNL_RV_PACING_CATHODE_ELECTRODE_1: NORMAL
MDC_IDC_SET_LEADCHNL_RV_PACING_CATHODE_LOCATION_1: NORMAL
MDC_IDC_SET_LEADCHNL_RV_PACING_POLARITY: NORMAL
MDC_IDC_SET_LEADCHNL_RV_PACING_PULSEWIDTH: 0.4 MS
MDC_IDC_SET_LEADCHNL_RV_SENSING_ANODE_ELECTRODE_1: NORMAL
MDC_IDC_SET_LEADCHNL_RV_SENSING_ANODE_LOCATION_1: NORMAL
MDC_IDC_SET_LEADCHNL_RV_SENSING_CATHODE_ELECTRODE_1: NORMAL
MDC_IDC_SET_LEADCHNL_RV_SENSING_CATHODE_LOCATION_1: NORMAL
MDC_IDC_SET_LEADCHNL_RV_SENSING_POLARITY: NORMAL
MDC_IDC_SET_LEADCHNL_RV_SENSING_SENSITIVITY: 0.9 MV
MDC_IDC_SET_ZONE_DETECTION_INTERVAL: 350 MS
MDC_IDC_SET_ZONE_DETECTION_INTERVAL: 400 MS
MDC_IDC_SET_ZONE_STATUS: NORMAL
MDC_IDC_SET_ZONE_STATUS: NORMAL
MDC_IDC_SET_ZONE_TYPE: NORMAL
MDC_IDC_SET_ZONE_VENDOR_TYPE: NORMAL
MDC_IDC_STAT_AT_BURDEN_PERCENT: 0 %
MDC_IDC_STAT_AT_DTM_END: NORMAL
MDC_IDC_STAT_AT_DTM_START: NORMAL
MDC_IDC_STAT_BRADY_AP_VP_PERCENT: 1.35 %
MDC_IDC_STAT_BRADY_AP_VS_PERCENT: 5.35 %
MDC_IDC_STAT_BRADY_AS_VP_PERCENT: 10.44 %
MDC_IDC_STAT_BRADY_AS_VS_PERCENT: 82.87 %
MDC_IDC_STAT_BRADY_DTM_END: NORMAL
MDC_IDC_STAT_BRADY_DTM_START: NORMAL
MDC_IDC_STAT_BRADY_RA_PERCENT_PACED: 7 %
MDC_IDC_STAT_BRADY_RV_PERCENT_PACED: 11.78 %
MDC_IDC_STAT_CRT_DTM_END: NORMAL
MDC_IDC_STAT_CRT_DTM_START: NORMAL
MDC_IDC_STAT_CRT_LV_PERCENT_PACED: 11.73 %
MDC_IDC_STAT_CRT_PERCENT_PACED: 11.73 %
MDC_IDC_STAT_EPISODE_RECENT_COUNT: 0
MDC_IDC_STAT_EPISODE_RECENT_COUNT_DTM_END: NORMAL
MDC_IDC_STAT_EPISODE_RECENT_COUNT_DTM_START: NORMAL
MDC_IDC_STAT_EPISODE_TOTAL_COUNT: 0
MDC_IDC_STAT_EPISODE_TOTAL_COUNT: 5
MDC_IDC_STAT_EPISODE_TOTAL_COUNT_DTM_END: NORMAL
MDC_IDC_STAT_EPISODE_TOTAL_COUNT_DTM_START: NORMAL
MDC_IDC_STAT_EPISODE_TYPE: NORMAL
MDC_IDC_STAT_TACHYTHERAPY_RECENT_DTM_END: NORMAL
MDC_IDC_STAT_TACHYTHERAPY_RECENT_DTM_START: NORMAL
MDC_IDC_STAT_TACHYTHERAPY_TOTAL_DTM_END: NORMAL
MDC_IDC_STAT_TACHYTHERAPY_TOTAL_DTM_START: NORMAL

## 2024-01-12 DIAGNOSIS — I42.8 NONISCHEMIC CARDIOMYOPATHY (H): ICD-10-CM

## 2024-01-12 DIAGNOSIS — I50.89 OTHER HEART FAILURE (H): ICD-10-CM

## 2024-01-12 DIAGNOSIS — Z13.6 ENCOUNTER FOR SCREENING FOR CARDIOVASCULAR DISORDERS: ICD-10-CM

## 2024-01-12 DIAGNOSIS — G71.11 MYOTONIC DYSTROPHY, TYPE 2 (H): Primary | ICD-10-CM

## 2024-01-22 ENCOUNTER — OFFICE VISIT (OUTPATIENT)
Dept: NEUROLOGY | Facility: CLINIC | Age: 69
End: 2024-01-22
Payer: COMMERCIAL

## 2024-01-22 ENCOUNTER — OFFICE VISIT (OUTPATIENT)
Dept: CARDIOLOGY | Facility: CLINIC | Age: 69
End: 2024-01-22
Attending: INTERNAL MEDICINE
Payer: COMMERCIAL

## 2024-01-22 ENCOUNTER — LAB (OUTPATIENT)
Dept: LAB | Facility: CLINIC | Age: 69
End: 2024-01-22
Attending: INTERNAL MEDICINE
Payer: COMMERCIAL

## 2024-01-22 ENCOUNTER — THERAPY VISIT (OUTPATIENT)
Dept: PHYSICAL THERAPY | Facility: CLINIC | Age: 69
End: 2024-01-22
Payer: COMMERCIAL

## 2024-01-22 VITALS
WEIGHT: 225.9 LBS | DIASTOLIC BLOOD PRESSURE: 76 MMHG | RESPIRATION RATE: 16 BRPM | BODY MASS INDEX: 28.99 KG/M2 | SYSTOLIC BLOOD PRESSURE: 133 MMHG | HEIGHT: 74 IN | OXYGEN SATURATION: 99 % | HEART RATE: 66 BPM

## 2024-01-22 VITALS
DIASTOLIC BLOOD PRESSURE: 76 MMHG | HEIGHT: 74 IN | SYSTOLIC BLOOD PRESSURE: 133 MMHG | WEIGHT: 225 LBS | BODY MASS INDEX: 28.88 KG/M2

## 2024-01-22 DIAGNOSIS — G71.11 MYOTONIC DYSTROPHY, TYPE 2 (H): Primary | ICD-10-CM

## 2024-01-22 DIAGNOSIS — G71.11 MYOTONIC DYSTROPHY, TYPE 2 (H): ICD-10-CM

## 2024-01-22 DIAGNOSIS — I42.8 NONISCHEMIC CARDIOMYOPATHY (H): ICD-10-CM

## 2024-01-22 DIAGNOSIS — Z13.6 ENCOUNTER FOR SCREENING FOR CARDIOVASCULAR DISORDERS: ICD-10-CM

## 2024-01-22 DIAGNOSIS — Z95.0 CARDIAC RESYNCHRONIZATION THERAPY PACEMAKER (CRT-P) IN PLACE: ICD-10-CM

## 2024-01-22 DIAGNOSIS — I50.89 OTHER HEART FAILURE (H): ICD-10-CM

## 2024-01-22 LAB
ALBUMIN SERPL BCG-MCNC: 4.4 G/DL (ref 3.5–5.2)
ALP SERPL-CCNC: 82 U/L (ref 40–150)
ALT SERPL W P-5'-P-CCNC: 35 U/L (ref 0–70)
ANION GAP SERPL CALCULATED.3IONS-SCNC: 9 MMOL/L (ref 7–15)
AST SERPL W P-5'-P-CCNC: 44 U/L (ref 0–45)
BILIRUB SERPL-MCNC: 0.9 MG/DL
BUN SERPL-MCNC: 14.6 MG/DL (ref 8–23)
CALCIUM SERPL-MCNC: 9.7 MG/DL (ref 8.8–10.2)
CHLORIDE SERPL-SCNC: 105 MMOL/L (ref 98–107)
CK SERPL-CCNC: 700 U/L (ref 39–308)
CREAT SERPL-MCNC: 0.53 MG/DL (ref 0.67–1.17)
DEPRECATED HCO3 PLAS-SCNC: 27 MMOL/L (ref 22–29)
EGFRCR SERPLBLD CKD-EPI 2021: >90 ML/MIN/1.73M2
ERYTHROCYTE [DISTWIDTH] IN BLOOD BY AUTOMATED COUNT: 13.2 % (ref 10–15)
GLUCOSE SERPL-MCNC: 83 MG/DL (ref 70–99)
HCT VFR BLD AUTO: 46.6 % (ref 40–53)
HGB BLD-MCNC: 16.2 G/DL (ref 13.3–17.7)
MAGNESIUM SERPL-MCNC: 2.2 MG/DL (ref 1.7–2.3)
MCH RBC QN AUTO: 32.1 PG (ref 26.5–33)
MCHC RBC AUTO-ENTMCNC: 34.8 G/DL (ref 31.5–36.5)
MCV RBC AUTO: 92 FL (ref 78–100)
NT-PROBNP SERPL-MCNC: 294 PG/ML (ref 0–900)
PLATELET # BLD AUTO: 192 10E3/UL (ref 150–450)
POTASSIUM SERPL-SCNC: 5.4 MMOL/L (ref 3.4–5.3)
PROT SERPL-MCNC: 7 G/DL (ref 6.4–8.3)
RBC # BLD AUTO: 5.05 10E6/UL (ref 4.4–5.9)
SODIUM SERPL-SCNC: 141 MMOL/L (ref 135–145)
TROPONIN T SERPL HS-MCNC: 64 NG/L
WBC # BLD AUTO: 8.8 10E3/UL (ref 4–11)

## 2024-01-22 PROCEDURE — 36415 COLL VENOUS BLD VENIPUNCTURE: CPT | Performed by: PATHOLOGY

## 2024-01-22 PROCEDURE — 80053 COMPREHEN METABOLIC PANEL: CPT | Performed by: PATHOLOGY

## 2024-01-22 PROCEDURE — G2211 COMPLEX E/M VISIT ADD ON: HCPCS | Performed by: PSYCHIATRY & NEUROLOGY

## 2024-01-22 PROCEDURE — 83880 ASSAY OF NATRIURETIC PEPTIDE: CPT | Performed by: PATHOLOGY

## 2024-01-22 PROCEDURE — 99417 PROLNG OP E/M EACH 15 MIN: CPT | Performed by: INTERNAL MEDICINE

## 2024-01-22 PROCEDURE — 83735 ASSAY OF MAGNESIUM: CPT | Performed by: PATHOLOGY

## 2024-01-22 PROCEDURE — 99215 OFFICE O/P EST HI 40 MIN: CPT | Performed by: INTERNAL MEDICINE

## 2024-01-22 PROCEDURE — 84484 ASSAY OF TROPONIN QUANT: CPT | Performed by: PATHOLOGY

## 2024-01-22 PROCEDURE — 93281 PM DEVICE PROGR EVAL MULTI: CPT | Performed by: INTERNAL MEDICINE

## 2024-01-22 PROCEDURE — 99213 OFFICE O/P EST LOW 20 MIN: CPT | Performed by: PSYCHIATRY & NEUROLOGY

## 2024-01-22 PROCEDURE — 85027 COMPLETE CBC AUTOMATED: CPT | Performed by: PATHOLOGY

## 2024-01-22 PROCEDURE — 82550 ASSAY OF CK (CPK): CPT | Performed by: PATHOLOGY

## 2024-01-22 PROCEDURE — 99207 PR NO BILLABLE SERVICE THIS VISIT: CPT | Performed by: PHYSICAL THERAPIST

## 2024-01-22 PROCEDURE — G0463 HOSPITAL OUTPT CLINIC VISIT: HCPCS | Performed by: INTERNAL MEDICINE

## 2024-01-22 RX ORDER — SILDENAFIL CITRATE 20 MG/1
20 TABLET ORAL DAILY PRN
COMMUNITY
Start: 2023-07-12

## 2024-01-22 RX ORDER — BISACODYL 5 MG/1
5 TABLET, DELAYED RELEASE ORAL
COMMUNITY
Start: 2023-11-03

## 2024-01-22 ASSESSMENT — PAIN SCALES - GENERAL: PAINLEVEL: NO PAIN (0)

## 2024-01-22 NOTE — PROGRESS NOTES
[facility]  Neurology Progress Note - Neuromuscular Division  [facility]    Patient Name:  Rajni Lara    MRN:  [id number]   :  1955  Date of Service:  2024  Primary care provider:  Christopher Field      History of Present Illness:   Rajni Lara is a 68 year old man who presents to the [facility] Neuromuscular department [facility] for follow up of genetically confirmed type 2 myotonic dystrophy.      He was last seen in our clinic in 2023.  At that time he was having mild difficulty with getting out of low chairs, using stairs, and was ambulating without assistive devices.  He did have some globus sensation when swallowing, but this was rare.  He continues to have rare episodes of swallowing difficulty, but denies choking episodes. This is only noticeable when attempting to eat difficult foods and drink at the same time.  In terms of breathing, he is not SOB with rest or some activity, but specific tasks such as climbing stairs and other proximal leg muscle exercise can cause some dyspnea, this is unchanged from last year.  He sleeps very well, getting about 9 hours per night.  Uses about 2 pillows each night, and was using 1 pillow last year, although he does not necessarily feel uncomfortable when lying flat during the evening and denies waking up with headache, also thinks he is well rested in the AM.  He follows with cardiology and Dr. Ashton, for which he received a PPM in May for atrial flutter.  There has been improvement in fatigue and palpitations since that time.      He continues to have some tremors of the hands, especially during tasks, and only finds this mildly troublesome but it has not interrupted any function. In terms of weakness, he has only 1 fall in the last year without any worsening of tripping or mobility changes.  He does not need assistive devices at this time.  Does notice some slight worsening of stairs  and getting out of low chairs, he almost always needs to use his arms for pulling himself up in these scenarios.  Notices very mild finger weakness when pushing buttons or gripping, but this has not changed any tasks or functional decline.  Turning is bed is also difficult.  He denies myotonia or cramping.  Does have some constipation for which oatmeal and Dulcolax are helpful.       ROS: A 10-point ROS was performed as per HPI.    PMH:  Past Medical History:   Diagnosis Date    Myotonic muscular dystrophy (H) 12/2021     Past Surgical History:   Procedure Laterality Date    CATARACT EXTRACTION Bilateral 2019    COLONOSCOPY  2018    EP PACEMAKER DEVICE & LEAD IMPLANT- RIGHT ATRIAL, RIGHT & LEFT VENTRICULAR N/A 5/1/2023    Procedure: Pacemaker Device & Lead Implant- Right Atrial, Right & Left Ventricular;  Surgeon: Jenaro Hadley MD;  Location:  HEART CARDIAC CATH LAB    EYE SURGERY         Allergies:  Allergies   Allergen Reactions    Sulfa Antibiotics Dermatitis, Headache and Photosensitivity       Medications:      Current Outpatient Medications:     acetaminophen (TYLENOL) 500 MG tablet, Take 1,000 mg by mouth, Disp: , Rfl:     acetaminophen-codeine (TYLENOL #3) 300-30 MG tablet, Take 1-2 tablets by mouth every 4 hours as needed for moderate to severe pain, Disp: 10 tablet, Rfl: 0    finasteride (PROSCAR) 5 MG tablet, Take 1 tablet by mouth every morning, Disp: , Rfl:     losartan (COZAAR) 25 MG tablet, Take 0.5 tablets (12.5 mg) by mouth daily, Disp: 45 tablet, Rfl: 3    naproxen sodium 220 MG capsule, Take 440 mg by mouth as needed (as needed), Disp: , Rfl:     nystatin-triamcinolone (MYCOLOG) 798123-9.1 UNIT/GM-% external ointment, Apply topically 2 times daily, Disp: , Rfl:     tamsulosin (FLOMAX) 0.4 MG capsule, TAKE ONE CAPSULE BY MOUTH AT BEDTIME, Disp: , Rfl:     Social History:  Social History     Tobacco Use    Smoking status: Never    Smokeless tobacco: Never   Substance Use Topics    Alcohol use:  "Yes     Comment: ocassionally       Family History:    No family history on file.      Physical Examination  Vitals: /76 (BP Location: Right arm, Patient Position: Sitting, Cuff Size: Adult Regular)   Pulse 66   Resp 16   Ht 1.89 m (6' 2.41\")   Wt 102.5 kg (225 lb 14.4 oz)   SpO2 99%   BMI 28.69 kg/m      General: Alert, interactive; NAD  HEENT: Normocephalic, atraumatic, nares patent, no oral lesions  Pulm: No increased work of breathing  Extremities: Warm and well perfused, peripheral pulses present, no edema  Musculoskeletal: No deformities of feet, hands, or limbs  Skin: Not jaundiced, no rash, no ecchymoses  Psych: Normal mood and affect    Neurologic:   Mental status: Attentive, interactive; Recalls remote and recent history with accuracy  Speech/Language: Fluent speech without paraphasic errors; No dysarthria  Cranial nerves: Visual fields intact to confrontation, Eyes conjugate on neutral gaze, Pupils 4mm and brisk, EOMI w/ normal and smooth pursuit, face expression symmetric, facial sensation intact and symmetric, hearing intact to conversation, shoulder shrug strong, palate rise symmetric, tongue/uvula midline.    Motor:   Bulk: Appropriate, no atrophy or hypertrophy appreciated  Tone: Appropriate, occasional paratonia  Spontaneous movements: No myoclonus, asterixis, or fasciculations  Inducible myotonia is present in the hands with strong     Power: *All strength assessments based on MRC grading  Pronator drift absent.   Neck flex 4, ext 5   Right Left   Arm abduction 4 4   Elbow Flex 5 5   Elbow Extension 4 4   Wrist Extension 5 5   FDI  5 5   APB 5 5   Fingertip Flexion 5 5     5 5   Hip Flexion 4- 4-   Hip Extension 4- 4-   Knee Flexion 5 5   Knee Extension 5 5   Dorsiflexion  5 5   Plantarflexion 5 5     Reflexes:    Right Left   Triceps 3 3   Biceps 3 3   BR 3 3   Patella 3 3   Achilles  2 2   Plantar down down   No crossed adductors or spread. No clonus    Coordination:   FNF " intact bilaterally without dysmetria  Finger tapping with normal amplitude and frequency    Gait/Station:   Unable to rise unassisted from a seated position, needs to use hands  Gait is slightly wide based, trendelenburg present in both hip, slight hyperextension of the knees when landing      INVESTIGATIONS:  MRI C-spine (4/17/23):  IMPRESSION:   1. No acute abnormality of the cervical spine.   2. Normal appearing cervical cord, no abnormal enhancement.   3. Multilevel degenerative spondylosis changes most notable for moderate   central narrowing at C5-C6 as well as milder central narrowing at other levels   as above.  There is also multilevel foraminal narrowing as described.     PFT's:   Latest Reference Range & Units 01/10/22 01/23/23   FVC-Pre L 3.78 3.59   FVC-Pred L 5.20 4.75   FVC-%Pred-Pre % 72 75   MIP-Pre cmH2O -40 -30   MEP-Pre cmH2O 77 64         IMPRESSION/PLAN:    Rajni Lara is a 68 year old man who presents to the [facility] Neuromuscular department [facility] clinic for follow up of genetically confirmed type 2 myotonic dystrophy.  He has trace worsening of proximal leg weakness in the last year without any functional decline.  He has otherwise been stable in terms of hand function, swallowing, and rare myotonia.  The last 2 PFTs obtained showed some downtrend, so may need rechecking. Otherwise he has been relatively stable and his exam is reassuring.        Plan:  Your strength and mobility is essentially stable in the last year.     - You should continue to follow with cardiology for arrhythmia and pacemaker  - You will see PT today  - Pulmonary function testing today  - Follow up in 1 year      Patient seen and discussed with attending Dr. Jennifer Arias MD  Neuromuscular Medicine Fellow, PGY-5  [facility]    ATTENDING ADDENDUM: Patient seen and examined with neuromuscular fellow Dr. Arias today at the [facility] clinic.  I agree  with his impression and plan.  Total time spent on this encounter today 20 minutes of which 10 face-to-face, 5 in postvisit note review, and editing, and 5 in previsit chart review.    The longitudinal plan of care for OJ Inveiss was addressed during this visit. Due to the added complexity in care, I will continue to support OJ in the subsequent management of this condition(s) and with the ongoing continuity of care of this condition(s): myotonic dystrophy type II    Reinaldo Rodriguez MD, FAAN  Clinical  of neurology  [facility]

## 2024-01-22 NOTE — PROGRESS NOTES
Neurocardiomyopathy Clinic Progress Note    Name: Rajni Lara  : 1955  MRN: [id number]    2024    Dear Dr. Rodriguez and Dr. Santiago,     I had the pleasure of seeing Mr. Rajni Lara, a 67 year old man today in the [facility] for a follow up visit for of myotonic dystrophy type 2 associated cardiomyopathy. He is accompanied by his wife.     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 He also has a family history of myotonic dystrophy in his sister and also probably his mother and maternal grandfather. He saw Dr. Cano in [facility] clinic on 1/10/22. He had an echocardiogram in  and was incidentally idenitied to have atrial flutter which showed an LVEF of 40-45%. In  he had a successful cardioversion for atrial flutter. He had a echo in 2009 which showed an LVEF of 50-55% and normal RV function. He had a nuclear stress test in  which showed thinning at the inferior base and no ischemia. He has not had cardiovascular follow up since then.     I last saw him on  2023. He saw Dr. Hadley on  for a follow up for CRT-P.. His echocardiogram in 2023 showed an LVEF of 45-50% and normal RV function. Bikes at home on a stationary bike 7-8 miles for 30 min and no dyspnea or chest pain. He does have some dyspnea with climbing stairs and challenge with muscle dysfunction with stairs, that is unchanged. Weight stable, no edema, orthopnea, or PND. No dizziness, but occasional lightheadedness in the sun in the summer but it has resolved now, occasional palpitations while going up the stairs to bed and resolves in 1-2 min. No change in appetite and energy has decreased some.     REVIEW OF SYSTEMS: 10 point ROS neg other than the symptoms noted above in the HPI.    PAST MEDICAL HISTORY:   1. Myotonic dystrophy type 2 s/p CRT-P   2. History of atrial  "flutter s/p [facility]   3. History of non ischemic cardiomyopathy   4. BPH      ALLERGIES:    Allergies   Allergen Reactions    Sulfa Antibiotics Dermatitis, Headache and Photosensitivity       MEDICATIONS:   acetaminophen (TYLENOL) 500 MG tablet, Take 1,000 mg by mouth  acetaminophen-codeine (TYLENOL #3) 300-30 MG tablet, Take 1-2 tablets by mouth every 4 hours as needed for moderate to severe pain  finasteride (PROSCAR) 5 MG tablet, Take 1 tablet by mouth every morning  losartan (COZAAR) 25 MG tablet, Take 0.5 tablets (12.5 mg) by mouth daily  naproxen sodium 220 MG capsule, Take 440 mg by mouth as needed (as needed)  nystatin-triamcinolone (MYCOLOG) 238262-2.1 UNIT/GM-% external ointment, Apply topically 2 times daily  tamsulosin (FLOMAX) 0.4 MG capsule, TAKE ONE CAPSULE BY MOUTH AT BEDTIME    No current facility-administered medications on file prior to visit.    No ASA - delete from medication record     SOCIAL HISTORY: He is retired and was a pharmacist. He lives with his wife in [place], [place].   Tobacco: never  Alcohol: rarely   Illicits: now  His family is from [facility]     FAMILY HISTORY:  His family is from [facility]. He has multiple maternal family members with myotonic dystrophy.   Mom when 70 when falling and missing steps and in her 80s she was bedbound.   Sister is a physician and has MD2  Maternal grand father had a heart attack 59, and  and also had some leg weakness  Maternal uncle sudden death at age 64  Son and younger daughter tested negative. Older daughter is positive for MD2      PHYSICAL EXAM:   /76   Ht 1.89 m (6' 2.41\")   Wt 102.1 kg (225 lb)   BMI 28.57 kg/m    General: NAD,   HEENT: anicteric, normocephalic, atraumatic  Neck: JVD <7cm at 90 degrees  CV: RRR, s1 and s2, no murmurs, rubs, or gallops, CRT-P left upper chest  Lungs: CTAB, no crackles or wheezes, normal work of breathing  Abdomen: soft, non tender, non distended  Extremities: warm and well perfused      DIAGNOSTIC " TESTING:      Latest Reference Range & Units 01/22/24 11:57   Sodium 135 - 145 mmol/L 141   Potassium 3.4 - 5.3 mmol/L 5.4 (H)   Chloride 98 - 107 mmol/L 105   Carbon Dioxide (CO2) 22 - 29 mmol/L 27   Urea Nitrogen 8.0 - 23.0 mg/dL 14.6   Creatinine 0.67 - 1.17 mg/dL 0.53 (L)   GFR Estimate >60 mL/min/1.73m2 >90   Calcium 8.8 - 10.2 mg/dL 9.7   Anion Gap 7 - 15 mmol/L 9   Magnesium 1.7 - 2.3 mg/dL 2.2   Albumin 3.5 - 5.2 g/dL 4.4   Protein Total 6.4 - 8.3 g/dL 7.0   Alkaline Phosphatase 40 - 150 U/L 82   ALT 0 - 70 U/L 35   AST 0 - 45 U/L 44   Bilirubin Total <=1.2 mg/dL 0.9   CK Total 39 - 308 U/L 700 (H)   Glucose 70 - 99 mg/dL 83   N-Terminal Pro Bnp 0 - 900 pg/mL 294   Troponin T, High Sensitivity <=22 ng/L 64 (H)   WBC 4.0 - 11.0 10e3/uL 8.8   Hemoglobin 13.3 - 17.7 g/dL 16.2   Hematocrit 40.0 - 53.0 % 46.6   Platelet Count 150 - 450 10e3/uL 192   RBC Count 4.40 - 5.90 10e6/uL 5.05   MCV 78 - 100 fL 92   MCH 26.5 - 33.0 pg 32.1   MCHC 31.5 - 36.5 g/dL 34.8   RDW 10.0 - 15.0 % 13.2   (H): Data is abnormally high  (L): Data is abnormally low        CRT-P interrogation: 1/22/24  Patient seen in [facility] for evaluation and iterative programming of their pacemaker per MD orders.     Device: Medtronic [id number] Percepta Quad CRT-P  Normal device function  Mode: DDD  bpm  AP: 7.5%  : 11.7%  BVP: 11.6%  Intrinsic Rhythm: AS-VS 70 bpm w/ PVC's and PAC's  PVC tvopvi=820/hour  Thoracic Impedance:  Near reference line.   Short V-V intervals: 0  Lead Trends Appear Stable.  Estimated battery longevity to RRT = 13.4 years.  Battery voltage = 3.14 V.   Atrial Arrhythmia: None  AF [place] = 0%  Anticoagulant: None  Ventricular Arrhythmia: None         Echo 8/30/2023: Left ventricular function is decreased. The ejection fraction is 45-50%  (mildly reduced). Grade I or early diastolic dysfunction. Mild diffuse  hypokinesis is present.  Global right ventricular function is normal. The right ventricle is normal  size.  No  significant valvular abnormalities.  The estimated PA systolic pressure is 35 mmHg.  IVC diameter >2.1 cm collapsing <50% with sniff suggests a high RA pressure  estimated at 15 mmHg or greater.  This study was compared with the study from 05/02/2022. The RA pressure is  higher. No significant changes in the biventricular function.  ___________________________________________________      CMR 2/20/2023: Clinical history: 67 year old with medical history of myotonic dystrophy type 2, >75 CCTG repeats of the  CNBP gene and strong family history of myotonic dystrophy, cardiomyopathy LV EF 46%, bifascicular block  with interval prolongation of IA and QRS here for CMR prior to cardiac resynchronization therapy.       Comparison CMR: 7/18/2022     1. The LV is normal in cavity size and wall thickness. The global systolic function is low normal. The LVEF  is 52%. There are no regional wall motion abnormalities.     2. The RV is normal in cavity size. The global systolic function is low normal. The RVEF is 51%.      3. Mild right atrial enlargement. Left atria is normal in size.     4. There is no significant valvular disease.      5. Late gadolinium enhancement imaging shows small area of mid myocardial enhancement of the basal to mid  septum.      6. There is no pericardial effusion or thickening.     CONCLUSIONS: Low normal biventricular function. LV EF 52%. RVEF 51% minimal improvement from prior study.  No significant valvular disease. Mild non-ischemic fibrosis in the septum unchanged from prior study.   the septum.     ziopatch monitor demonstrated sinus with 2.3% PVCs, no VT, and brief AV Wenckebach      ECG 7/18/22: personally reviewed: sinus with PVC, bifascular block, QRS duration 146ms.     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 He has a history of atrial  flutter and a mildly reduced LVEF and progressive bifasicular block now status post CRT-P.      1. 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 Mild non ischemic, biventricular systolic heart failure  3. History of presyncope  3. History of atrial flutter  4. Bifascicular block s/p CRT-P 5/2023  5. Wenckebach   6. Hypertension history     He continues to have preserved functional capacity and exercises routinely with no symptoms. We discussed that exercise is very reasonable to maintain muscle mass and he should stop if he has dyspnea, muscle pain, or other symptoms. He is euvolemic and well compensated. He is tolerating low dose losartan with normalized BP and normal renal function and potassium. His NT pro BNP is normal and he does have an elevated hscTNT, which has previously been elevated and is largely unchanged.     On device interrogation, he has V pacing 11.7% and has corresponding BiV pacing of 11.6%, which is effectively 99% BiV pacing of the time he is pacing, which is appropriate for the indication.     LV function was largely unchanged post CRT-P. He should have a CMR with contrast at the next visit.  An mineralecorticoid receptor antagonists could be considered in the future due to his cardiac fibrosis  to slow the progression of scar tissue.     PLAN:   -CRT-P interrogation   -CMR with contrast with annual visit   -annual visit which can be coordinated with the visit with Dr. Cano       60 minutes on DOS for chart review, visit,  counseling, review of diagnostic testing, coordination of care and documentation.     Thank you for allowing me to participate in the care of your patient. Please do not hesitate to contact me if you have any questions.     Sincerely,   Forum     Forum MD Daisha, PhD, FACC  Advanced Heart Failure/Transplantation/MCS  [facility]/[employer]

## 2024-01-22 NOTE — PATIENT INSTRUCTIONS
Continue stationary bike exercise  We will send a NEURO PT order to your home. Call and schedule a PT visit near you    Angie Allen PT, DPT  Physical Therapist  [facility] - [address]  [address] [place] [address]  [email or web address]  Appointments: [phone]       Exercise recommendations with Muscular Dystrophy (MD)    If you are new to a form of exercise it is important to start SLOWLY and see how your body responds. Please see the directions below on how to safely exercise with MD.    Stretching  Maintaining range of motion is important to reduce pain and tightness in weakened muscles. This should be done daily.    Your physical therapist will identify a few key stretches for you to do. Each stretch should be held for 30 secs without bouncing, 2-3 repetitions.      Cardiovascular training  Three options are known to be safe for persons with MD  - swimming  - biking (recumbent is usually best tolerated)  - walking    It is recommended that you complete cardiovascular training for up to 30 mins at a time, 3x per week.    A good starting point is usually 10 mins at a slow, comfortable pace. Allow a break day in between.   - If you are more fatigued or sore, then it was too much. You need to wait until your symptoms of over-use resolve before trying again. Next time try only 5 mins and see how you feel. If symptoms persist, then likely your body won't tolerate cardiovascular training.  - If you are feeling ok the next day after doing 10 mins of exercise, then you can try it again. Repeat your exercise 3 times before increasing the time by no more than 5 mins.  - Continue to assess your symptoms along the way. If you have increased fatigue or soreness, then you will need to back up to the last level.  - The goal of cardiovascular training is continuous movement. Don't worry about increasing speed, incline, or resistance.   - Every day may  be a little different depending on your fatigue and other activities you have been doing. Listen to your body and gauge your exercise routine accordingly.      Strength  Strength training of muscles that are weakened is to be avoided, as it will just over strain them and there is potential to increase the progression of weakness in these muscles.    The research does not state whether strength training muscles that are currently not affected by your MD with change the progression of your disease.     If you choose to strength train your non-affected  muscles, it is recommended to use a low level of weight with higher reps (10-20 reps). Make sure you have at least 1 rest day in between strength training sessions. If you notice increase in fatigue or soreness, then what you did was too much, and you should discontinue exercising those muscles.

## 2024-01-22 NOTE — PROGRESS NOTES
Spent brief time with patient and wife today in MD clinic. Patient had previous PT assessment in 2022. Patient reports some difficulty standing from low surfaces, but overall is independent. Patient is able to perform stationary bike 30-45 minutes, level 4-5 resistance without adverse effects. Briefly talked about myotonic muscular dystrophy and exercise recommendations, energy conservation recommendations and adaptive equipment uses.    Recommend patient follow up in local PT clinic in order to establish appropriate exercise guidelines if needed.    Total time spent with patient: 16 minutes  Total billable time: 0 minutes    Angie Allen PT, DPT  Physical Therapist  [facility] [address]  [address] [place] [address]  [email or web address]  Appointments: [phone]

## 2024-01-22 NOTE — NURSING NOTE
"Chief Complaint   Patient presents with    RECHECK     /76 (BP Location: Right arm, Patient Position: Sitting, Cuff Size: Adult Regular)   Pulse 66   Resp 16   Ht 1.89 m (6' 2.41\")   Wt 102.5 kg (225 lb 14.4 oz)   SpO2 99%   BMI 28.69 kg/m      SAM SIMPSON    "

## 2024-01-22 NOTE — PATIENT INSTRUCTIONS
It was a pleasure to see you in clinic today, please do not hesitate to call us for questions or concerns.  Your next automatic remote pacemaker check from home is scheduled for 4/22/2024.    Donna Ambrose RN    Electrophysiology Nurse Clinician  [facility]    During Business Hours Please Call:  [phone]  After Hours Please Call:  [phone] - select option #4 and ask for job code [id number]

## 2024-01-22 NOTE — LETTER
2024      RE: Rajni Lara  [address] [facility] [address]       Dear Colleague,    Thank you for the opportunity to participate in the care of your patient, Rajni Lara, at the [facility] [place] at [facility]. Please see a copy of my visit note below.    Neurocardiomyopathy Clinic Progress Note    Name: Rajni Lara  : 1955  MRN: [id number]    2024    Dear Dr. Rodriguez and Dr. Santiago,     I had the pleasure of seeing Mr. Rajni Lara, a 67 year old man today in the [facility] Neurocardiomyopathy Clinic for a follow up visit for of myotonic dystrophy type 2 associated cardiomyopathy. He is accompanied by his wife.     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 He also has a family history of myotonic dystrophy in his sister and also probably his mother and maternal grandfather. He saw Dr. Cano in [facility] on 1/10/22. He had an echocardiogram in  and was incidentally idenitied to have atrial flutter which showed an LVEF of 40-45%. In  he had a successful cardioversion for atrial flutter. He had a echo in 2009 which showed an LVEF of 50-55% and normal RV function. He had a nuclear stress test in  which showed thinning at the inferior base and no ischemia. He has not had cardiovascular follow up since then.     I last saw him on  2023. He saw Dr. Hadley on  for a follow up for CRT-P.. His echocardiogram in 2023 showed an LVEF of 45-50% and normal RV function. Bikes at home on a stationary bike 7-8 miles for 30 min and no dyspnea or chest pain. He does have some dyspnea with climbing stairs and challenge with muscle dysfunction with stairs, that is unchanged. Weight stable, no edema, orthopnea, or PND. No dizziness, but occasional lightheadedness in the sun in the summer but it has  "resolved now, occasional palpitations while going up the stairs to bed and resolves in 1-2 min. No change in appetite and energy has decreased some.     REVIEW OF SYSTEMS: 10 point ROS neg other than the symptoms noted above in the HPI.    PAST MEDICAL HISTORY:   1. Myotonic dystrophy type 2 s/p CRT-P   2. History of atrial flutter s/p DCCV   3. History of non ischemic cardiomyopathy   4. BPH      ALLERGIES:    Allergies   Allergen Reactions     Sulfa Antibiotics Dermatitis, Headache and Photosensitivity       MEDICATIONS:   acetaminophen (TYLENOL) 500 MG tablet, Take 1,000 mg by mouth  acetaminophen-codeine (TYLENOL #3) 300-30 MG tablet, Take 1-2 tablets by mouth every 4 hours as needed for moderate to severe pain  finasteride (PROSCAR) 5 MG tablet, Take 1 tablet by mouth every morning  losartan (COZAAR) 25 MG tablet, Take 0.5 tablets (12.5 mg) by mouth daily  naproxen sodium 220 MG capsule, Take 440 mg by mouth as needed (as needed)  nystatin-triamcinolone (MYCOLOG) 854530-6.1 UNIT/GM-% external ointment, Apply topically 2 times daily  tamsulosin (FLOMAX) 0.4 MG capsule, TAKE ONE CAPSULE BY MOUTH AT BEDTIME    No current facility-administered medications on file prior to visit.    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   Tobacco: never  Alcohol: rarely   Illicits: now  His family is from [place] [facility]     FAMILY HISTORY:  His family is from [place]. He has multiple maternal family members with myotonic dystrophy.   Mom when 70 when falling and missing steps and in her 80s she was bedbound.   Sister is a physician and has MD2  Maternal grand father had a heart attack 59, and  and also had some leg weakness  Maternal uncle sudden death at age 64  Son and younger daughter tested negative. Older daughter is positive for MD2      PHYSICAL EXAM:   /76   Ht 1.89 m (6' 2.41\")   Wt 102.1 kg (225 lb)   BMI 28.57 kg/m  "   General: NAD,   HEENT: anicteric, normocephalic, atraumatic  Neck: JVD <7cm at 90 degrees  CV: RRR, s1 and s2, no murmurs, rubs, or gallops, CRT-P left upper chest  Lungs: CTAB, no crackles or wheezes, normal work of breathing  Abdomen: soft, non tender, non distended  Extremities: warm and well perfused      DIAGNOSTIC TESTING:      Latest Reference Range & Units 01/22/24 11:57   Sodium 135 - 145 mmol/L 141   Potassium 3.4 - 5.3 mmol/L 5.4 (H)   Chloride 98 - 107 mmol/L 105   Carbon Dioxide (CO2) 22 - 29 mmol/L 27   Urea Nitrogen 8.0 - 23.0 mg/dL 14.6   Creatinine 0.67 - 1.17 mg/dL 0.53 (L)   GFR Estimate >60 mL/min/1.73m2 >90   Calcium 8.8 - 10.2 mg/dL 9.7   Anion Gap 7 - 15 mmol/L 9   Magnesium 1.7 - 2.3 mg/dL 2.2   Albumin 3.5 - 5.2 g/dL 4.4   Protein Total 6.4 - 8.3 g/dL 7.0   Alkaline Phosphatase 40 - 150 U/L 82   ALT 0 - 70 U/L 35   AST 0 - 45 U/L 44   Bilirubin Total <=1.2 mg/dL 0.9   CK Total 39 - 308 U/L 700 (H)   Glucose 70 - 99 mg/dL 83   N-Terminal Pro Bnp 0 - 900 pg/mL 294   Troponin T, High Sensitivity <=22 ng/L 64 (H)   WBC 4.0 - 11.0 10e3/uL 8.8   Hemoglobin 13.3 - 17.7 g/dL 16.2   Hematocrit 40.0 - 53.0 % 46.6   Platelet Count 150 - 450 10e3/uL 192   RBC Count 4.40 - 5.90 10e6/uL 5.05   MCV 78 - 100 fL 92   MCH 26.5 - 33.0 pg 32.1   MCHC 31.5 - 36.5 g/dL 34.8   RDW 10.0 - 15.0 % 13.2   (H): Data is abnormally high  (L): Data is abnormally low        CRT-P interrogation: 1/22/24  Patient seen in [facility] for evaluation and iterative programming of their pacemaker per MD orders.     Device: Medtronic [id number] Percepta Quad CRT-P  Normal device function  Mode: DDD  bpm  AP: 7.5%  : 11.7%  BVP: 11.6%  Intrinsic Rhythm: AS-VS 70 bpm w/ PVC's and PAC's  PVC mahxmp=114/hour  Thoracic Impedance:  Near reference line.   Short V-V intervals: 0  Lead Trends Appear Stable.  Estimated battery longevity to RRT = 13.4 years.  Battery voltage = 3.14 V.   Atrial Arrhythmia: None  AF [place] = 0%  Anticoagulant:  None  Ventricular Arrhythmia: None         Echo 8/30/2023: Left ventricular function is decreased. The ejection fraction is 45-50%  (mildly reduced). Grade I or early diastolic dysfunction. Mild diffuse  hypokinesis is present.  Global right ventricular function is normal. The right ventricle is normal  size.  No significant valvular abnormalities.  The estimated PA systolic pressure is 35 mmHg.  IVC diameter >2.1 cm collapsing <50% with sniff suggests a high RA pressure  estimated at 15 mmHg or greater.  This study was compared with the study from 05/02/2022. The RA pressure is  higher. No significant changes in the biventricular function.  ___________________________________________________      CMR 2/20/2023: Clinical history: 67 year old with medical history of myotonic dystrophy type 2, >75 CCTG repeats of the  CNBP gene and strong family history of myotonic dystrophy, cardiomyopathy LV EF 46%, bifascicular block  with interval prolongation of OR and QRS here for CMR prior to cardiac resynchronization therapy.       Comparison CMR: 7/18/2022     1. The LV is normal in cavity size and wall thickness. The global systolic function is low normal. The LVEF  is 52%. There are no regional wall motion abnormalities.     2. The RV is normal in cavity size. The global systolic function is low normal. The RVEF is 51%.      3. Mild right atrial enlargement. Left atria is normal in size.     4. There is no significant valvular disease.      5. Late gadolinium enhancement imaging shows small area of mid myocardial enhancement of the basal to mid  septum.      6. There is no pericardial effusion or thickening.     CONCLUSIONS: Low normal biventricular function. LV EF 52%. RVEF 51% minimal improvement from prior study.  No significant valvular disease. Mild non-ischemic fibrosis in the septum unchanged from prior study.   the septum.     ziopatch monitor demonstrated sinus with 2.3% PVCs, no VT, and brief AV  Wenckebach      ECG 7/18/22: personally reviewed: sinus with PVC, bifascular block, QRS duration 146ms.     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 He has a history of atrial flutter and a mildly reduced LVEF and progressive bifasicular block now status post CRT-P.      1. 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 Mild non ischemic, biventricular systolic heart failure  3. History of presyncope  3. History of atrial flutter  4. Bifascicular block s/p CRT-P 5/2023  5. Wenckebach   6. Hypertension history     He continues to have preserved functional capacity and exercises routinely with no symptoms. We discussed that exercise is very reasonable to maintain muscle mass and he should stop if he has dyspnea, muscle pain, or other symptoms. He is euvolemic and well compensated. He is tolerating low dose losartan with normalized BP and normal renal function and potassium. His NT pro BNP is normal and he does have an elevated hscTNT, which has previously been elevated and is largely unchanged.     On device interrogation, he has V pacing 11.7% and has corresponding BiV pacing of 11.6%, which is effectively 99% BiV pacing of the time he is pacing, which is appropriate for the indication.     LV function was largely unchanged post CRT-P. He should have a CMR with contrast at the next visit.  An mineralecorticoid receptor antagonists could be considered in the future due to his cardiac fibrosis  to slow the progression of scar tissue.     PLAN:   -CRT-P interrogation   -CMR with contrast with annual visit   -annual visit which can be coordinated with the visit with Dr. Cano       60 minutes on DOS for chart review, visit,  counseling, review of diagnostic testing, coordination of care and documentation.     Thank you for allowing me  to participate in the care of your patient. Please do not hesitate to contact me if you have any questions.     Sincerely,   [employer]     Mark Ashton MD, PhD, [facility]  Advanced Heart Failure/Transplantation/MCS  [facility]      Please do not hesitate to contact me if you have any questions/concerns.     Sincerely,     Mark Ashton MD

## 2024-01-22 NOTE — PATIENT INSTRUCTIONS
"You were seen today in the Cardiovascular Clinic at the [facility].      Cardiology Providers you saw during your visit:  Dr. Mark Ashton     Recommendations:   Annual visit with Dr. Ashton which can be coordinated with the visit with Dr. Cano. Cardiac MRI (with contrast), device check, and labs prior to this appointment.       Thank you for your visit today!   Please MyChart message or call if you have any questions or concerns.      During Business Hours:  [phone], option # 1 \"To leave a message for your care team\"     After hours, weekends or holidays:   [phone], Option #4  Ask to speak to the On-Call Cardiologist. Inform them you are a heart failure patient at the [facility].      Joan Tolbert RN BSN   Cardiology Nurse Coordinator - Heart Failure/C.O.R.E. Clinic  [facility]  [phone] option 1 to schedule an appointment or leave a message for your care team      "

## 2024-01-22 NOTE — NURSING NOTE
Labs: Patient was given results of the laboratory testing obtained today. Patient demonstrated understanding of this information and agreed to call with further questions or concerns.     Med Reconcile: Reviewed and verified all current medications with the patient. The updated medication list was printed and given to the patient.    Return Appointment: Patient given instructions regarding scheduling next clinic visit. Patient demonstrated understanding of this information and agreed to call with further questions or concerns. Annual visit with Dr. Ashton (can be coordinated with the visit with Dr. Cano), labs, device check and cardiac MRI prior.    Patient stated he understood all health information given and agreed to call with further questions or concerns.    Joan Tolbert RN

## 2024-01-22 NOTE — PATIENT INSTRUCTIONS
Your strength and mobility is essentially stable in the last year.     - You should continue to follow with cardiology for arrhythmia and pacemaker  - You will see PT today  - Pulmonary function testing today  - Follow up in 1 year

## 2024-01-24 DIAGNOSIS — G71.11 MYOTONIC DYSTROPHY, TYPE 2 (H): Primary | ICD-10-CM

## 2024-01-25 LAB
MDC_IDC_EPISODE_DTM: NORMAL
MDC_IDC_EPISODE_DURATION: 22 S
MDC_IDC_EPISODE_DURATION: 28 S
MDC_IDC_EPISODE_DURATION: 33 S
MDC_IDC_EPISODE_DURATION: 521 S
MDC_IDC_EPISODE_DURATION: 63 S
MDC_IDC_EPISODE_DURATION: 64 S
MDC_IDC_EPISODE_DURATION: 72 S
MDC_IDC_EPISODE_ID: 409
MDC_IDC_EPISODE_ID: 410
MDC_IDC_EPISODE_ID: 411
MDC_IDC_EPISODE_ID: 412
MDC_IDC_EPISODE_ID: 413
MDC_IDC_EPISODE_ID: NORMAL
MDC_IDC_EPISODE_TYPE: NORMAL
MDC_IDC_LEAD_CONNECTION_STATUS: NORMAL
MDC_IDC_LEAD_IMPLANT_DT: NORMAL
MDC_IDC_LEAD_LOCATION: NORMAL
MDC_IDC_LEAD_LOCATION_DETAIL_1: NORMAL
MDC_IDC_LEAD_MFG: NORMAL
MDC_IDC_LEAD_MODEL: NORMAL
MDC_IDC_LEAD_POLARITY_TYPE: NORMAL
MDC_IDC_LEAD_SERIAL: NORMAL
MDC_IDC_LEAD_SPECIAL_FUNCTION: NORMAL
MDC_IDC_MSMT_BATTERY_DTM: NORMAL
MDC_IDC_MSMT_BATTERY_REMAINING_LONGEVITY: 159 MO
MDC_IDC_MSMT_BATTERY_RRT_TRIGGER: 2.6
MDC_IDC_MSMT_BATTERY_STATUS: NORMAL
MDC_IDC_MSMT_BATTERY_VOLTAGE: 3.14 V
MDC_IDC_MSMT_LEADCHNL_LV_IMPEDANCE_VALUE: 1140 OHM
MDC_IDC_MSMT_LEADCHNL_LV_IMPEDANCE_VALUE: 1216 OHM
MDC_IDC_MSMT_LEADCHNL_LV_IMPEDANCE_VALUE: 1235 OHM
MDC_IDC_MSMT_LEADCHNL_LV_IMPEDANCE_VALUE: 418 OHM
MDC_IDC_MSMT_LEADCHNL_LV_IMPEDANCE_VALUE: 437 OHM
MDC_IDC_MSMT_LEADCHNL_LV_IMPEDANCE_VALUE: 589 OHM
MDC_IDC_MSMT_LEADCHNL_LV_IMPEDANCE_VALUE: 703 OHM
MDC_IDC_MSMT_LEADCHNL_LV_IMPEDANCE_VALUE: 855 OHM
MDC_IDC_MSMT_LEADCHNL_LV_IMPEDANCE_VALUE: 874 OHM
MDC_IDC_MSMT_LEADCHNL_LV_IMPEDANCE_VALUE: 950 OHM
MDC_IDC_MSMT_LEADCHNL_LV_PACING_THRESHOLD_AMPLITUDE: 1.25 V
MDC_IDC_MSMT_LEADCHNL_LV_PACING_THRESHOLD_PULSEWIDTH: 0.8 MS
MDC_IDC_MSMT_LEADCHNL_RA_IMPEDANCE_VALUE: 380 OHM
MDC_IDC_MSMT_LEADCHNL_RA_IMPEDANCE_VALUE: 494 OHM
MDC_IDC_MSMT_LEADCHNL_RA_PACING_THRESHOLD_AMPLITUDE: 0.5 V
MDC_IDC_MSMT_LEADCHNL_RA_PACING_THRESHOLD_PULSEWIDTH: 0.4 MS
MDC_IDC_MSMT_LEADCHNL_RA_SENSING_INTR_AMPL: 3.12 MV
MDC_IDC_MSMT_LEADCHNL_RV_IMPEDANCE_VALUE: 361 OHM
MDC_IDC_MSMT_LEADCHNL_RV_IMPEDANCE_VALUE: 437 OHM
MDC_IDC_MSMT_LEADCHNL_RV_PACING_THRESHOLD_AMPLITUDE: 0.75 V
MDC_IDC_MSMT_LEADCHNL_RV_PACING_THRESHOLD_PULSEWIDTH: 0.4 MS
MDC_IDC_MSMT_LEADCHNL_RV_SENSING_INTR_AMPL: 17.5 MV
MDC_IDC_PG_IMPLANT_DTM: NORMAL
MDC_IDC_PG_MFG: NORMAL
MDC_IDC_PG_MODEL: NORMAL
MDC_IDC_PG_SERIAL: NORMAL
MDC_IDC_PG_TYPE: NORMAL
MDC_IDC_SESS_CLINIC_NAME: NORMAL
MDC_IDC_SESS_DTM: NORMAL
MDC_IDC_SESS_TYPE: NORMAL
MDC_IDC_SET_BRADY_AT_MODE_SWITCH_RATE: 171 {BEATS}/MIN
MDC_IDC_SET_BRADY_LOWRATE: 50 {BEATS}/MIN
MDC_IDC_SET_BRADY_MAX_SENSOR_RATE: 130 {BEATS}/MIN
MDC_IDC_SET_BRADY_MAX_TRACKING_RATE: 130 {BEATS}/MIN
MDC_IDC_SET_BRADY_MODE: NORMAL
MDC_IDC_SET_BRADY_PAV_DELAY_HIGH: 100 MS
MDC_IDC_SET_BRADY_PAV_DELAY_LOW: 350 MS
MDC_IDC_SET_BRADY_SAV_DELAY_HIGH: 70 MS
MDC_IDC_SET_BRADY_SAV_DELAY_LOW: 300 MS
MDC_IDC_SET_CRT_LVRV_DELAY: 0 MS
MDC_IDC_SET_CRT_PACED_CHAMBERS: NORMAL
MDC_IDC_SET_LEADCHNL_LV_PACING_AMPLITUDE: 2 V
MDC_IDC_SET_LEADCHNL_LV_PACING_ANODE_ELECTRODE_1: NORMAL
MDC_IDC_SET_LEADCHNL_LV_PACING_ANODE_LOCATION_1: NORMAL
MDC_IDC_SET_LEADCHNL_LV_PACING_CAPTURE_MODE: NORMAL
MDC_IDC_SET_LEADCHNL_LV_PACING_CATHODE_ELECTRODE_1: NORMAL
MDC_IDC_SET_LEADCHNL_LV_PACING_CATHODE_LOCATION_1: NORMAL
MDC_IDC_SET_LEADCHNL_LV_PACING_POLARITY: NORMAL
MDC_IDC_SET_LEADCHNL_LV_PACING_PULSEWIDTH: 0.8 MS
MDC_IDC_SET_LEADCHNL_RA_PACING_AMPLITUDE: 1.5 V
MDC_IDC_SET_LEADCHNL_RA_PACING_ANODE_ELECTRODE_1: NORMAL
MDC_IDC_SET_LEADCHNL_RA_PACING_ANODE_LOCATION_1: NORMAL
MDC_IDC_SET_LEADCHNL_RA_PACING_CAPTURE_MODE: NORMAL
MDC_IDC_SET_LEADCHNL_RA_PACING_CATHODE_ELECTRODE_1: NORMAL
MDC_IDC_SET_LEADCHNL_RA_PACING_CATHODE_LOCATION_1: NORMAL
MDC_IDC_SET_LEADCHNL_RA_PACING_POLARITY: NORMAL
MDC_IDC_SET_LEADCHNL_RA_PACING_PULSEWIDTH: 0.4 MS
MDC_IDC_SET_LEADCHNL_RA_SENSING_ANODE_ELECTRODE_1: NORMAL
MDC_IDC_SET_LEADCHNL_RA_SENSING_ANODE_LOCATION_1: NORMAL
MDC_IDC_SET_LEADCHNL_RA_SENSING_CATHODE_ELECTRODE_1: NORMAL
MDC_IDC_SET_LEADCHNL_RA_SENSING_CATHODE_LOCATION_1: NORMAL
MDC_IDC_SET_LEADCHNL_RA_SENSING_POLARITY: NORMAL
MDC_IDC_SET_LEADCHNL_RA_SENSING_SENSITIVITY: 0.3 MV
MDC_IDC_SET_LEADCHNL_RV_PACING_AMPLITUDE: 2 V
MDC_IDC_SET_LEADCHNL_RV_PACING_ANODE_ELECTRODE_1: NORMAL
MDC_IDC_SET_LEADCHNL_RV_PACING_ANODE_LOCATION_1: NORMAL
MDC_IDC_SET_LEADCHNL_RV_PACING_CAPTURE_MODE: NORMAL
MDC_IDC_SET_LEADCHNL_RV_PACING_CATHODE_ELECTRODE_1: NORMAL
MDC_IDC_SET_LEADCHNL_RV_PACING_CATHODE_LOCATION_1: NORMAL
MDC_IDC_SET_LEADCHNL_RV_PACING_POLARITY: NORMAL
MDC_IDC_SET_LEADCHNL_RV_PACING_PULSEWIDTH: 0.4 MS
MDC_IDC_SET_LEADCHNL_RV_SENSING_ANODE_ELECTRODE_1: NORMAL
MDC_IDC_SET_LEADCHNL_RV_SENSING_ANODE_LOCATION_1: NORMAL
MDC_IDC_SET_LEADCHNL_RV_SENSING_CATHODE_ELECTRODE_1: NORMAL
MDC_IDC_SET_LEADCHNL_RV_SENSING_CATHODE_LOCATION_1: NORMAL
MDC_IDC_SET_LEADCHNL_RV_SENSING_POLARITY: NORMAL
MDC_IDC_SET_LEADCHNL_RV_SENSING_SENSITIVITY: 0.9 MV
MDC_IDC_SET_ZONE_DETECTION_INTERVAL: 350 MS
MDC_IDC_SET_ZONE_DETECTION_INTERVAL: 400 MS
MDC_IDC_SET_ZONE_STATUS: NORMAL
MDC_IDC_SET_ZONE_STATUS: NORMAL
MDC_IDC_SET_ZONE_TYPE: NORMAL
MDC_IDC_SET_ZONE_VENDOR_TYPE: NORMAL
MDC_IDC_STAT_AT_BURDEN_PERCENT: 0 %
MDC_IDC_STAT_AT_DTM_END: NORMAL
MDC_IDC_STAT_AT_DTM_START: NORMAL
MDC_IDC_STAT_BRADY_AP_VP_PERCENT: 1.45 %
MDC_IDC_STAT_BRADY_AP_VS_PERCENT: 5.71 %
MDC_IDC_STAT_BRADY_AS_VP_PERCENT: 10.25 %
MDC_IDC_STAT_BRADY_AS_VS_PERCENT: 82.58 %
MDC_IDC_STAT_BRADY_DTM_END: NORMAL
MDC_IDC_STAT_BRADY_DTM_START: NORMAL
MDC_IDC_STAT_BRADY_RA_PERCENT_PACED: 7.55 %
MDC_IDC_STAT_BRADY_RV_PERCENT_PACED: 11.7 %
MDC_IDC_STAT_CRT_DTM_END: NORMAL
MDC_IDC_STAT_CRT_DTM_START: NORMAL
MDC_IDC_STAT_CRT_LV_PERCENT_PACED: 11.65 %
MDC_IDC_STAT_CRT_PERCENT_PACED: 11.64 %
MDC_IDC_STAT_EPISODE_RECENT_COUNT: 0
MDC_IDC_STAT_EPISODE_RECENT_COUNT_DTM_END: NORMAL
MDC_IDC_STAT_EPISODE_RECENT_COUNT_DTM_START: NORMAL
MDC_IDC_STAT_EPISODE_TOTAL_COUNT: 0
MDC_IDC_STAT_EPISODE_TOTAL_COUNT: 5
MDC_IDC_STAT_EPISODE_TOTAL_COUNT_DTM_END: NORMAL
MDC_IDC_STAT_EPISODE_TOTAL_COUNT_DTM_START: NORMAL
MDC_IDC_STAT_EPISODE_TYPE: NORMAL
MDC_IDC_STAT_TACHYTHERAPY_RECENT_DTM_END: NORMAL
MDC_IDC_STAT_TACHYTHERAPY_RECENT_DTM_START: NORMAL
MDC_IDC_STAT_TACHYTHERAPY_TOTAL_DTM_END: NORMAL
MDC_IDC_STAT_TACHYTHERAPY_TOTAL_DTM_START: NORMAL

## 2024-01-29 ENCOUNTER — TELEPHONE (OUTPATIENT)
Dept: NEUROLOGY | Facility: CLINIC | Age: 69
End: 2024-01-29
Payer: COMMERCIAL

## 2024-01-29 NOTE — TELEPHONE ENCOUNTER
Patient Contacted for the patient to call back and schedule the following:    Appointment type: MIP/MEP  Provider: STARLA  Return date: 08/14/2024  Specialty phone number: [phone]  Additional appointment(s) needed: N/A  Additonal Notes: N/A

## 2024-01-31 ENCOUNTER — TELEPHONE (OUTPATIENT)
Dept: CARDIOLOGY | Facility: CLINIC | Age: 69
End: 2024-01-31
Payer: COMMERCIAL

## 2024-01-31 NOTE — TELEPHONE ENCOUNTER
[facility] Call Center    Phone Message    May a detailed message be left on voicemail: yes     Reason for Call: Other: patient is calling and would like the care team to call him to further explain why he needs a general cardiology consult when seeing terri, I personally explained and he still would like more clarification than I can give, please advise,thank you.     Action Taken: Other: cardiology     Travel Screening: Not Applicable  Thank you!  Specialty Access Center

## 2024-01-31 NOTE — TELEPHONE ENCOUNTER
I returned Oj's call and let him know it is not necessary for him to see a general cardiologist in addition to Dr. Ashton and the order was cancelled.    Joan Tolbert RN

## 2024-04-21 ENCOUNTER — ANCILLARY PROCEDURE (OUTPATIENT)
Dept: CARDIOLOGY | Facility: CLINIC | Age: 69
End: 2024-04-21
Attending: INTERNAL MEDICINE
Payer: COMMERCIAL

## 2024-04-21 DIAGNOSIS — Z95.0 CARDIAC RESYNCHRONIZATION THERAPY PACEMAKER (CRT-P) IN PLACE: ICD-10-CM

## 2024-04-21 PROCEDURE — 93294 REM INTERROG EVL PM/LDLS PM: CPT | Performed by: INTERNAL MEDICINE

## 2024-04-21 PROCEDURE — 93296 REM INTERROG EVL PM/IDS: CPT

## 2024-04-24 LAB
MDC_IDC_EPISODE_DTM: NORMAL
MDC_IDC_EPISODE_DURATION: 134 S
MDC_IDC_EPISODE_DURATION: 136 S
MDC_IDC_EPISODE_DURATION: 21 S
MDC_IDC_EPISODE_DURATION: 251 S
MDC_IDC_EPISODE_DURATION: 382 S
MDC_IDC_EPISODE_DURATION: 44 S
MDC_IDC_EPISODE_DURATION: 53 S
MDC_IDC_EPISODE_DURATION: 56 S
MDC_IDC_EPISODE_DURATION: 58 S
MDC_IDC_EPISODE_DURATION: 69 S
MDC_IDC_EPISODE_DURATION: 88 S
MDC_IDC_EPISODE_DURATION: 9 S
MDC_IDC_EPISODE_DURATION: 92 S
MDC_IDC_EPISODE_ID: 10
MDC_IDC_EPISODE_ID: 11
MDC_IDC_EPISODE_ID: 554
MDC_IDC_EPISODE_ID: 555
MDC_IDC_EPISODE_ID: 556
MDC_IDC_EPISODE_ID: 557
MDC_IDC_EPISODE_ID: 558
MDC_IDC_EPISODE_ID: 6
MDC_IDC_EPISODE_ID: 7
MDC_IDC_EPISODE_ID: 8
MDC_IDC_EPISODE_ID: 9
MDC_IDC_EPISODE_ID: NORMAL
MDC_IDC_EPISODE_TYPE: NORMAL
MDC_IDC_LEAD_CONNECTION_STATUS: NORMAL
MDC_IDC_LEAD_IMPLANT_DT: NORMAL
MDC_IDC_LEAD_LOCATION: NORMAL
MDC_IDC_LEAD_LOCATION_DETAIL_1: NORMAL
MDC_IDC_LEAD_MFG: NORMAL
MDC_IDC_LEAD_MODEL: NORMAL
MDC_IDC_LEAD_POLARITY_TYPE: NORMAL
MDC_IDC_LEAD_SERIAL: NORMAL
MDC_IDC_LEAD_SPECIAL_FUNCTION: NORMAL
MDC_IDC_MSMT_BATTERY_DTM: NORMAL
MDC_IDC_MSMT_BATTERY_REMAINING_LONGEVITY: 156 MO
MDC_IDC_MSMT_BATTERY_RRT_TRIGGER: 2.6
MDC_IDC_MSMT_BATTERY_STATUS: NORMAL
MDC_IDC_MSMT_BATTERY_VOLTAGE: 3.09 V
MDC_IDC_MSMT_LEADCHNL_LV_IMPEDANCE_VALUE: 1045 OHM
MDC_IDC_MSMT_LEADCHNL_LV_IMPEDANCE_VALUE: 1178 OHM
MDC_IDC_MSMT_LEADCHNL_LV_IMPEDANCE_VALUE: 1197 OHM
MDC_IDC_MSMT_LEADCHNL_LV_IMPEDANCE_VALUE: 399 OHM
MDC_IDC_MSMT_LEADCHNL_LV_IMPEDANCE_VALUE: 437 OHM
MDC_IDC_MSMT_LEADCHNL_LV_IMPEDANCE_VALUE: 570 OHM
MDC_IDC_MSMT_LEADCHNL_LV_IMPEDANCE_VALUE: 741 OHM
MDC_IDC_MSMT_LEADCHNL_LV_IMPEDANCE_VALUE: 855 OHM
MDC_IDC_MSMT_LEADCHNL_LV_IMPEDANCE_VALUE: 874 OHM
MDC_IDC_MSMT_LEADCHNL_LV_IMPEDANCE_VALUE: 912 OHM
MDC_IDC_MSMT_LEADCHNL_LV_PACING_THRESHOLD_AMPLITUDE: 1.25 V
MDC_IDC_MSMT_LEADCHNL_LV_PACING_THRESHOLD_PULSEWIDTH: 0.8 MS
MDC_IDC_MSMT_LEADCHNL_RA_IMPEDANCE_VALUE: 323 OHM
MDC_IDC_MSMT_LEADCHNL_RA_IMPEDANCE_VALUE: 437 OHM
MDC_IDC_MSMT_LEADCHNL_RA_PACING_THRESHOLD_AMPLITUDE: 0.62 V
MDC_IDC_MSMT_LEADCHNL_RA_PACING_THRESHOLD_PULSEWIDTH: 0.4 MS
MDC_IDC_MSMT_LEADCHNL_RA_SENSING_INTR_AMPL: 3.62 MV
MDC_IDC_MSMT_LEADCHNL_RV_IMPEDANCE_VALUE: 342 OHM
MDC_IDC_MSMT_LEADCHNL_RV_IMPEDANCE_VALUE: 399 OHM
MDC_IDC_MSMT_LEADCHNL_RV_PACING_THRESHOLD_AMPLITUDE: 0.62 V
MDC_IDC_MSMT_LEADCHNL_RV_PACING_THRESHOLD_PULSEWIDTH: 0.4 MS
MDC_IDC_MSMT_LEADCHNL_RV_SENSING_INTR_AMPL: 12.12 MV
MDC_IDC_PG_IMPLANT_DTM: NORMAL
MDC_IDC_PG_MFG: NORMAL
MDC_IDC_PG_MODEL: NORMAL
MDC_IDC_PG_SERIAL: NORMAL
MDC_IDC_PG_TYPE: NORMAL
MDC_IDC_SESS_CLINIC_NAME: NORMAL
MDC_IDC_SESS_DTM: NORMAL
MDC_IDC_SESS_TYPE: NORMAL
MDC_IDC_SET_BRADY_AT_MODE_SWITCH_RATE: 171 {BEATS}/MIN
MDC_IDC_SET_BRADY_LOWRATE: 50 {BEATS}/MIN
MDC_IDC_SET_BRADY_MAX_SENSOR_RATE: 130 {BEATS}/MIN
MDC_IDC_SET_BRADY_MAX_TRACKING_RATE: 130 {BEATS}/MIN
MDC_IDC_SET_BRADY_MODE: NORMAL
MDC_IDC_SET_BRADY_PAV_DELAY_HIGH: 100 MS
MDC_IDC_SET_BRADY_PAV_DELAY_LOW: 350 MS
MDC_IDC_SET_BRADY_SAV_DELAY_HIGH: 70 MS
MDC_IDC_SET_BRADY_SAV_DELAY_LOW: 300 MS
MDC_IDC_SET_CRT_LVRV_DELAY: 0 MS
MDC_IDC_SET_CRT_PACED_CHAMBERS: NORMAL
MDC_IDC_SET_LEADCHNL_LV_PACING_AMPLITUDE: 1.75 V
MDC_IDC_SET_LEADCHNL_LV_PACING_ANODE_ELECTRODE_1: NORMAL
MDC_IDC_SET_LEADCHNL_LV_PACING_ANODE_LOCATION_1: NORMAL
MDC_IDC_SET_LEADCHNL_LV_PACING_CAPTURE_MODE: NORMAL
MDC_IDC_SET_LEADCHNL_LV_PACING_CATHODE_ELECTRODE_1: NORMAL
MDC_IDC_SET_LEADCHNL_LV_PACING_CATHODE_LOCATION_1: NORMAL
MDC_IDC_SET_LEADCHNL_LV_PACING_POLARITY: NORMAL
MDC_IDC_SET_LEADCHNL_LV_PACING_PULSEWIDTH: 0.8 MS
MDC_IDC_SET_LEADCHNL_RA_PACING_AMPLITUDE: 1.5 V
MDC_IDC_SET_LEADCHNL_RA_PACING_ANODE_ELECTRODE_1: NORMAL
MDC_IDC_SET_LEADCHNL_RA_PACING_ANODE_LOCATION_1: NORMAL
MDC_IDC_SET_LEADCHNL_RA_PACING_CAPTURE_MODE: NORMAL
MDC_IDC_SET_LEADCHNL_RA_PACING_CATHODE_ELECTRODE_1: NORMAL
MDC_IDC_SET_LEADCHNL_RA_PACING_CATHODE_LOCATION_1: NORMAL
MDC_IDC_SET_LEADCHNL_RA_PACING_POLARITY: NORMAL
MDC_IDC_SET_LEADCHNL_RA_PACING_PULSEWIDTH: 0.4 MS
MDC_IDC_SET_LEADCHNL_RA_SENSING_ANODE_ELECTRODE_1: NORMAL
MDC_IDC_SET_LEADCHNL_RA_SENSING_ANODE_LOCATION_1: NORMAL
MDC_IDC_SET_LEADCHNL_RA_SENSING_CATHODE_ELECTRODE_1: NORMAL
MDC_IDC_SET_LEADCHNL_RA_SENSING_CATHODE_LOCATION_1: NORMAL
MDC_IDC_SET_LEADCHNL_RA_SENSING_POLARITY: NORMAL
MDC_IDC_SET_LEADCHNL_RA_SENSING_SENSITIVITY: 0.3 MV
MDC_IDC_SET_LEADCHNL_RV_PACING_AMPLITUDE: 2 V
MDC_IDC_SET_LEADCHNL_RV_PACING_ANODE_ELECTRODE_1: NORMAL
MDC_IDC_SET_LEADCHNL_RV_PACING_ANODE_LOCATION_1: NORMAL
MDC_IDC_SET_LEADCHNL_RV_PACING_CAPTURE_MODE: NORMAL
MDC_IDC_SET_LEADCHNL_RV_PACING_CATHODE_ELECTRODE_1: NORMAL
MDC_IDC_SET_LEADCHNL_RV_PACING_CATHODE_LOCATION_1: NORMAL
MDC_IDC_SET_LEADCHNL_RV_PACING_POLARITY: NORMAL
MDC_IDC_SET_LEADCHNL_RV_PACING_PULSEWIDTH: 0.4 MS
MDC_IDC_SET_LEADCHNL_RV_SENSING_ANODE_ELECTRODE_1: NORMAL
MDC_IDC_SET_LEADCHNL_RV_SENSING_ANODE_LOCATION_1: NORMAL
MDC_IDC_SET_LEADCHNL_RV_SENSING_CATHODE_ELECTRODE_1: NORMAL
MDC_IDC_SET_LEADCHNL_RV_SENSING_CATHODE_LOCATION_1: NORMAL
MDC_IDC_SET_LEADCHNL_RV_SENSING_POLARITY: NORMAL
MDC_IDC_SET_LEADCHNL_RV_SENSING_SENSITIVITY: 0.9 MV
MDC_IDC_SET_ZONE_DETECTION_INTERVAL: 350 MS
MDC_IDC_SET_ZONE_DETECTION_INTERVAL: 400 MS
MDC_IDC_SET_ZONE_STATUS: NORMAL
MDC_IDC_SET_ZONE_STATUS: NORMAL
MDC_IDC_SET_ZONE_TYPE: NORMAL
MDC_IDC_SET_ZONE_VENDOR_TYPE: NORMAL
MDC_IDC_STAT_AT_BURDEN_PERCENT: 0.1 %
MDC_IDC_STAT_AT_DTM_END: NORMAL
MDC_IDC_STAT_AT_DTM_START: NORMAL
MDC_IDC_STAT_BRADY_AP_VP_PERCENT: 1.58 %
MDC_IDC_STAT_BRADY_AP_VS_PERCENT: 6.83 %
MDC_IDC_STAT_BRADY_AS_VP_PERCENT: 11.07 %
MDC_IDC_STAT_BRADY_AS_VS_PERCENT: 80.52 %
MDC_IDC_STAT_BRADY_DTM_END: NORMAL
MDC_IDC_STAT_BRADY_DTM_START: NORMAL
MDC_IDC_STAT_BRADY_RA_PERCENT_PACED: 9.12 %
MDC_IDC_STAT_BRADY_RV_PERCENT_PACED: 12.65 %
MDC_IDC_STAT_CRT_DTM_END: NORMAL
MDC_IDC_STAT_CRT_DTM_START: NORMAL
MDC_IDC_STAT_CRT_LV_PERCENT_PACED: 12.58 %
MDC_IDC_STAT_CRT_PERCENT_PACED: 12.58 %
MDC_IDC_STAT_EPISODE_RECENT_COUNT: 0
MDC_IDC_STAT_EPISODE_RECENT_COUNT: 6
MDC_IDC_STAT_EPISODE_RECENT_COUNT_DTM_END: NORMAL
MDC_IDC_STAT_EPISODE_RECENT_COUNT_DTM_START: NORMAL
MDC_IDC_STAT_EPISODE_TOTAL_COUNT: 0
MDC_IDC_STAT_EPISODE_TOTAL_COUNT: 11
MDC_IDC_STAT_EPISODE_TOTAL_COUNT_DTM_END: NORMAL
MDC_IDC_STAT_EPISODE_TOTAL_COUNT_DTM_START: NORMAL
MDC_IDC_STAT_EPISODE_TYPE: NORMAL
MDC_IDC_STAT_TACHYTHERAPY_RECENT_DTM_END: NORMAL
MDC_IDC_STAT_TACHYTHERAPY_RECENT_DTM_START: NORMAL
MDC_IDC_STAT_TACHYTHERAPY_TOTAL_DTM_END: NORMAL
MDC_IDC_STAT_TACHYTHERAPY_TOTAL_DTM_START: NORMAL

## 2024-08-21 ENCOUNTER — ANCILLARY PROCEDURE (OUTPATIENT)
Dept: CARDIOLOGY | Facility: CLINIC | Age: 69
End: 2024-08-21
Attending: INTERNAL MEDICINE
Payer: COMMERCIAL

## 2024-08-21 ENCOUNTER — OFFICE VISIT (OUTPATIENT)
Dept: PULMONOLOGY | Facility: CLINIC | Age: 69
End: 2024-08-21
Attending: PSYCHIATRY & NEUROLOGY
Payer: COMMERCIAL

## 2024-08-21 VITALS
HEART RATE: 54 BPM | OXYGEN SATURATION: 99 % | SYSTOLIC BLOOD PRESSURE: 118 MMHG | WEIGHT: 227.4 LBS | DIASTOLIC BLOOD PRESSURE: 68 MMHG | BODY MASS INDEX: 28.88 KG/M2

## 2024-08-21 DIAGNOSIS — Z95.0 CARDIAC RESYNCHRONIZATION THERAPY PACEMAKER (CRT-P) IN PLACE: ICD-10-CM

## 2024-08-21 DIAGNOSIS — G71.11 MYOTONIC DYSTROPHY, TYPE 2 (H): ICD-10-CM

## 2024-08-21 DIAGNOSIS — I45.2 BIFASCICULAR BLOCK: ICD-10-CM

## 2024-08-21 LAB
EXPTIME-PRE: 5.67 SEC
FEF2575-%PRED-PRE: 93 %
FEF2575-PRE: 2.51 L/SEC
FEF2575-PRED: 2.68 L/SEC
FEFMAX-%PRED-PRE: 89 %
FEFMAX-PRE: 8.66 L/SEC
FEFMAX-PRED: 9.64 L/SEC
FEV1-%PRED-PRE: 74 %
FEV1-PRE: 2.64 L
FEV1FEV6-PRE: 81 %
FEV1FEV6-PRED: 78 %
FEV1FVC-PRE: 80 %
FEV1FVC-PRED: 76 %
FIFMAX-PRE: 5 L/SEC
FVC-%PRED-PRE: 70 %
FVC-PRE: 3.29 L
FVC-PRED: 4.67 L
MEP-PRE: 65 CMH2O
MIP-PRE: -52 CMH2O

## 2024-08-21 PROCEDURE — 94799 UNLISTED PULMONARY SVC/PX: CPT | Performed by: INTERNAL MEDICINE

## 2024-08-21 PROCEDURE — 94375 RESPIRATORY FLOW VOLUME LOOP: CPT | Performed by: INTERNAL MEDICINE

## 2024-08-21 PROCEDURE — 99214 OFFICE O/P EST MOD 30 MIN: CPT | Mod: 25 | Performed by: INTERNAL MEDICINE

## 2024-08-21 PROCEDURE — G0463 HOSPITAL OUTPT CLINIC VISIT: HCPCS | Performed by: INTERNAL MEDICINE

## 2024-08-21 PROCEDURE — 93281 PM DEVICE PROGR EVAL MULTI: CPT | Performed by: INTERNAL MEDICINE

## 2024-08-21 ASSESSMENT — PAIN SCALES - GENERAL: PAINLEVEL: NO PAIN (0)

## 2024-08-21 NOTE — PATIENT INSTRUCTIONS
Plan:    Follow-up in 1 year with EP JONH with device check prior      Your Care Team:  EP Cardiology   Telephone Number     Carolina Nettles RN [phone]    After business hours: [phone], ask for cardiologist on-call   Non-procedure scheduling:    Lazara SWANSON   [phone]   Procedure scheduling:    Eloise Renae   [phone]   Device Clinic (Pacemakers, ICDs, Loop Recorders)    During business hours: [phone]  After business hours:   [phone]- select option 4 and ask for job code [phone].       Cardiovascular Clinic:   [address] [place] [address]. [place] [address]      As always, thank you for trusting us with your health care needs!

## 2024-08-21 NOTE — LETTER
8/21/2024      RE: Rajni Lara  [address] [facility] [address]       Dear Colleague,    Thank you for the opportunity to participate in the care of your patient, Rajni Lara, at the [facility] [place] [facility]. Please see a copy of my visit note below.          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     Myotonic Dystrophy Type II NICM LVEF 46%, NYHA II:  1. ACEi/ARB: Continue Losartan.  2. BB: Not currently on due to bradycardia and bifascicular block on ECG   3. Aldosterone antagonist: Not currently required.  4. SCD prophylaxis He hd a class IIa indication for pacing if MO>240 ms and QRS >140 ms, or if HV >70 ms on invasive testing. He had CRTP placed on 5/1/23. Device is programmed to be on demand BVP only and not forced BVP. Thus, we are not aiming for 100% BVP. Continue routine device clinic follow-up.   5. Fluid status: having some positional dizziness, recommended hydration.     Staffed with Dr. Hadley.    Dinesh Riley MD PhD  Cardiac Electrophysiology Fellow  P: Text Page     Follow-up in 1 year with EP JONH.     EP STAFF NOTE  Patient seen and examined and management plan personally reviewed with the patient. I agree with the note above by the CV/EP fellow.  Jenaro Hadley MD [facility]  Cardiology - Electrophysiology  Total time spent on patient visit, reviewing notes, imaging, labs, orders, and completing necessary documentation: 30 minutes.  >50% of visit spent on counseling patient and/or coordination of care.         History of Present Illness/Subjective    Mr. Rajni Lara is a 67 year old male who comes in today for EP consultation of conduction delays in MD  patient.     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     He has known gene positive myotonic dystrophy type 2. In 2008, he had an echo which showed LVEF 40-45% and he was found to have AFL. He had a DCCVs in 2009. No recurrences since. A follow-up echo showed LVEF 50-55% in sinus. NM stress test in 2009 which showed thinning at the inferior base and no ischemia.He was lost to Cardiology follow-up until he res established with Dr. Ashton. A CMR from 7/2022 showed LVEF 46%, JEFFERSON, and LGE mid myocardial enhancement in the septum, and no ischemia. His ziopatch monitor from, 4/25/22-5/1/22 demonstrated sinus with 2.3% PVCs, no VT, and brief AV Wenckebach at 92bpm with sinus slowing prior. ECG shows bifascicular block and he was referred to EP for conduction delays in a myotonic dystrophy patient.     EP Visit 9/21/22: He reports feeling at baseline. He had an episode of syncope in 12/2020, after standing for a couple of minutes, he passed out while standing. He had some SOB after eating and drinking soda and has had some dizzy spells since. ECG has evolved from RBBB to bifascicular block from April to July, with MS prolonging from 200 to 224 ms range, along with widening of the QRS from around 120 ms when measured manually to >140 ms.. He denies chest discomfort, palpitations, abdominal fullness/bloating or peripheral edema, shortness of breath, paroxysmal nocturnal dyspnea, orthopnea, lightheadedness, dizziness, pre-syncope, or syncope. Presenting 12 lead ECG shows SB 1 AVB bifasicular block Vent Rate 55 bpm,  ms,  ms, QTc 476 ms. Current cardiac medications include: Losartan and ASA.     EP Visit 3/1/23: He presents today for follow up. He was agreeable to proceeding with CRTP, but wanted clinic appointment to discuss again first. QRS duration  has progressed last 156 ms.  He reports feeling more tired over last couple months. He denies chest discomfort, palpitations, abdominal fullness/bloating or peripheral edema, shortness of breath, paroxysmal nocturnal dyspnea, orthopnea, lightheadedness, dizziness, pre-syncope, or syncope. A recent CMR showed low normal biventricular function. LV EF 52%. RVEF 51% minimal improvement from prior study (reviewed again and EF the same in the 45% range) . No significant valvular disease. Mild non-ischemic fibrosis in the septum unchanged from prior study. 12 lead ECG from 2023 shows SR   ms,  (increased prior to last visit) ms. Current cardiac medications include: Losartan and ASA.    EP Visit 23: He presents today for follow up. He had CRTP implant on 23. Device site well healed. Echo today shows LVEF 45-50%, normal RV size/function, and no significant valvular abnormalitites. He reports feeling well overall. He has some lightheadedness mostly with standing. He denies chest discomfort, palpitations, abdominal fullness/bloating or peripheral edema, shortness of breath, paroxysmal nocturnal dyspnea, orthopnea, pre-syncope, or syncope. Presenting 12 lead ECG shows SB 1 AVB Vent Rate 54 bpm,  ms,  ms, QTc 455 ms. Device interrogation shows normal device function, stable lead parameters, no arrhythmias recorded, and , BVP 16.1% (set for on-demand pacing only). Current cardiac medications include: Losartan and ASA..     EP Visit 2024: Patient presents today for follow up. Was last seen about a year ago. Notes that he has been doing well. He spends time with his grand kids and is able to walk several blocks to the park while pushing a stroller. No other symptoms.    Family History:   He was diagnosed with MD earlier this year after his sister was diagnosed with it. His mother was bedridden for the last 7-8 years of her life,  at 87, and had very easy falls before that  Kids 2  tested negative and one tested positive  Brother tested negative  He has signs of wekaness, tough time going up steps and getting up chairs      I have reviewed and updated the patient's Past Medical History, Social History, Family History and Medication List.     Cardiographics (Personally Reviewed) :   8/30/23 Echo:  Interpretation Summary  Left ventricular function is decreased. The ejection fraction is 45-50%  (mildly reduced). Grade I or early diastolic dysfunction. Mild diffuse  hypokinesis is present.  Global right ventricular function is normal. The right ventricle is normal  size.  No significant valvular abnormalities.  The estimated PA systolic pressure is 35 mmHg.  IVC diameter >2.1 cm collapsing <50% with sniff suggests a high RA pressure  estimated at 15 mmHg or greater.  This study was compared with the study from 05/02/2022. The RA pressure is  higher. No significant changes in the biventricular function.    5/2/22 Echo:   Interpretation Summary  Left ventricular function is decreased. The ejection fraction is 45-50% (mildly reduced).  The right ventricle is normal size.  Global right ventricular function is normal.  No significant valvular abnormalities were noted.  The inferior vena cava was normal in size with preserved respiratory variability.  No pericardial effusion is present.    7/18/22 CMR:  1. The LV is normal in cavity size and wall thickness. The global systolic function is mildly reduced. The LVEF is 46%. There are no regional wall motion abnormalities.  2. The RV is normal in cavity size. The global systolic function is mildly reduced. The RVEF is 47%.   3. Both atria are severely enlarged.  4. There is no significant valvular disease.   5. Late gadolinium enhancement imaging shows mid-myocardial enhancement in the septum. This is often seen in non-ischemic DCM, and is not typical for dystrophin-related CM (typically involves posterior wall). There is no myocardial edema.   6.  Regadenoson stress perfusion imaging shows no ischemia.  7. There is no pericardial effusion or thickening.  8. There is no intracardiac thrombus.  CONCLUSIONS: Mild NICM, LVEF 46% and RVEF 47%. No myocardial ischemia, with mild non-ischemic fibrosis in  the septum.     2/20/23 CMR:  1. The LV is normal in cavity size and wall thickness. The global systolic function is low normal. The LVEF  is 52%. There are no regional wall motion abnormalities.  2. The RV is normal in cavity size. The global systolic function is low normal. The RVEF is 51%.   3. Mild right atrial enlargement. Left atria is normal in size.  4. There is no significant valvular disease.   5. Late gadolinium enhancement imaging shows small area of mid myocardial enhancement of the basal to mid  septum.   6. There is no pericardial effusion or thickening.  CONCLUSIONS: Low normal biventricular function. LV EF 52%. RVEF 51% minimal improvement from prior study.  No significant valvular disease. Mild non-ischemic fibrosis in the septum unchanged from prior study.           Physical Examination   There were no vitals taken for this visit.  Wt Readings from Last 3 Encounters:   01/22/24 102.1 kg (225 lb)   01/22/24 102.5 kg (225 lb 14.4 oz)   08/30/23 101.9 kg (224 lb 9.6 oz)   There were no vitals taken for this visit.    The rest of a comprehensive physical examination is deferred due to nature of video visit restrictions.  CONSITUTIONAL: no acute distress  HEENT: no icterus, no redness or discharge, neck supple  CV: no visible edema of visualized extremities. No JVD.   RESPIRATORY: respirations nonlabored, no cough  NEURO: AA&Ox3, speech fluent/appropriate, motor grossly nonfocal  PSYCH: cooperative, affect appropriate  DERM: no rashes on visualized face/neck/upper extremities         Medications  Allergies   Current Outpatient Medications   Medication Sig Dispense Refill     acetaminophen (TYLENOL) 500 MG tablet Take 1,000 mg by mouth        acetaminophen-codeine (TYLENOL #3) 300-30 MG tablet Take 1-2 tablets by mouth every 4 hours as needed for moderate to severe pain 10 tablet 0     bisacodyl (DULCOLAX) 5 MG EC tablet Take 5 mg by mouth (Patient not taking: Reported on 1/22/2024)       finasteride (PROSCAR) 5 MG tablet Take 1 tablet by mouth every morning       losartan (COZAAR) 25 MG tablet Take 0.5 tablets (12.5 mg) by mouth daily 45 tablet 3     naproxen sodium 220 MG capsule Take 440 mg by mouth as needed (as needed)       nystatin-triamcinolone (MYCOLOG) 669032-9.1 UNIT/GM-% external ointment Apply topically 2 times daily       PEG 3350-KCl-NaBcb-NaCl-NaSulf 240 g SOLR 4,000 mLs (Patient not taking: Reported on 1/22/2024)       sildenafil (REVATIO) 20 MG tablet Take 20 mg by mouth daily as needed       tamsulosin (FLOMAX) 0.4 MG capsule TAKE ONE CAPSULE BY MOUTH AT BEDTIME      Allergies   Allergen Reactions     Sulfa Antibiotics Dermatitis, Headache and Photosensitivity         Lab Results (Personally Reviewed)    Chemistry/lipid CBC Cardiac Enzymes/BNP/TSH/INR   Lab Results   Component Value Date    BUN 14.6 01/22/2024     01/22/2024    CO2 27 01/22/2024     Creatinine   Date Value Ref Range Status   01/22/2024 0.53 (L) 0.67 - 1.17 mg/dL Final       Lab Results   Component Value Date    CHOL 220 (H) 04/25/2022    HDL 63 04/25/2022     (H) 04/25/2022      Lab Results   Component Value Date    WBC 8.8 01/22/2024    HGB 16.2 01/22/2024    HCT 46.6 01/22/2024    MCV 92 01/22/2024     01/22/2024    Lab Results   Component Value Date    TSH 2.60 01/10/2022                         Please do not hesitate to contact me if you have any questions/concerns.     Sincerely,     Jenaro Hadley MD

## 2024-08-21 NOTE — NURSING NOTE
Chief Complaint   Patient presents with    Follow Up     1 yr follow-up CRTP. Myotonic dystrophy with bifascicular block.       Vitals were taken, medications reconciled.    Radha Ramires, Clinic Assistant   11:06 AM

## 2024-08-21 NOTE — PROGRESS NOTES
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     Myotonic Dystrophy Type II NICM LVEF 46%, NYHA II:  1. ACEi/ARB: Continue Losartan.  2. BB: Not currently on due to bradycardia and bifascicular block on ECG   3. Aldosterone antagonist: Not currently required.  4. SCD prophylaxis He hd a class IIa indication for pacing if KY>240 ms and QRS >140 ms, or if HV >70 ms on invasive testing. He had CRTP placed on 5/1/23. Device is programmed to be on demand BVP only and not forced BVP. Thus, we are not aiming for 100% BVP. Continue routine device clinic follow-up.   5. Fluid status: having some positional dizziness, recommended hydration.     Staffed with Dr. Hadley.    Dinesh Riley MD PhD  Cardiac Electrophysiology Fellow  P: Text Page     Follow-up in 1 year with EP JONH.     EP STAFF NOTE  Patient seen and examined and management plan personally reviewed with the patient. I agree with the note above by the CV/EP fellow.  Jenaro Hadley MD [facility]  Cardiology - Electrophysiology  Total time spent on patient visit, reviewing notes, imaging, labs, orders, and completing necessary documentation: 30 minutes.  >50% of visit spent on counseling patient and/or coordination of care.         History of Present Illness/Subjective    Mr. Rajni Lara is a 67 year old male who comes in today for EP consultation of conduction delays in MD patient.     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     He has known gene positive myotonic dystrophy type 2. In 2008, he had an echo which showed LVEF 40-45% and he was found  to have AFL. He had a DCCVs in 2009. No recurrences since. A follow-up echo showed LVEF 50-55% in sinus. NM stress test in 2009 which showed thinning at the inferior base and no ischemia.He was lost to Cardiology follow-up until he res established with Dr. Ashton. A CMR from 7/2022 showed LVEF 46%, JEFFERSON, and LGE mid myocardial enhancement in the septum, and no ischemia. His ziopatch monitor from, 4/25/22-5/1/22 demonstrated sinus with 2.3% PVCs, no VT, and brief AV Wenckebach at 92bpm with sinus slowing prior. ECG shows bifascicular block and he was referred to EP for conduction delays in a myotonic dystrophy patient.     EP Visit 9/21/22: He reports feeling at baseline. He had an episode of syncope in 12/2020, after standing for a couple of minutes, he passed out while standing. He had some SOB after eating and drinking soda and has had some dizzy spells since. ECG has evolved from RBBB to bifascicular block from April to July, with HI prolonging from 200 to 224 ms range, along with widening of the QRS from around 120 ms when measured manually to >140 ms.. He denies chest discomfort, palpitations, abdominal fullness/bloating or peripheral edema, shortness of breath, paroxysmal nocturnal dyspnea, orthopnea, lightheadedness, dizziness, pre-syncope, or syncope. Presenting 12 lead ECG shows SB 1 AVB bifasicular block Vent Rate 55 bpm,  ms,  ms, QTc 476 ms. Current cardiac medications include: Losartan and ASA.     EP Visit 3/1/23: He presents today for follow up. He was agreeable to proceeding with CRTP, but wanted clinic appointment to discuss again first. QRS duration has progressed last 156 ms.  He reports feeling more tired over last couple months. He denies chest discomfort, palpitations, abdominal fullness/bloating or peripheral edema, shortness of breath, paroxysmal nocturnal dyspnea, orthopnea, lightheadedness, dizziness, pre-syncope, or syncope. A recent CMR showed low normal biventricular  function. LV EF 52%. RVEF 51% minimal improvement from prior study (reviewed again and EF the same in the 45% range) . No significant valvular disease. Mild non-ischemic fibrosis in the septum unchanged from prior study. 12 lead ECG from 2023 shows SR   ms,  (increased prior to last visit) ms. Current cardiac medications include: Losartan and ASA.    EP Visit 23: He presents today for follow up. He had CRTP implant on 23. Device site well healed. Echo today shows LVEF 45-50%, normal RV size/function, and no significant valvular abnormalitites. He reports feeling well overall. He has some lightheadedness mostly with standing. He denies chest discomfort, palpitations, abdominal fullness/bloating or peripheral edema, shortness of breath, paroxysmal nocturnal dyspnea, orthopnea, pre-syncope, or syncope. Presenting 12 lead ECG shows SB 1 AVB Vent Rate 54 bpm,  ms,  ms, QTc 455 ms. Device interrogation shows normal device function, stable lead parameters, no arrhythmias recorded, and , BVP 16.1% (set for on-demand pacing only). Current cardiac medications include: Losartan and ASA..     EP Visit 2024: Patient presents today for follow up. Was last seen about a year ago. Notes that he has been doing well. He spends time with his grand kids and is able to walk several blocks to the park while pushing a stroller. No other symptoms.    Family History:   He was diagnosed with MD earlier this year after his sister was diagnosed with it. His mother was bedridden for the last 7-8 years of her life,  at 87, and had very easy falls before that  Kids 2 tested negative and one tested positive  Brother tested negative  He has signs of wekaness, tough time going up steps and getting up chairs      I have reviewed and updated the patient's Past Medical History, Social History, Family History and Medication List.     Cardiographics (Personally Reviewed) :   23 Echo:  Interpretation  Summary  Left ventricular function is decreased. The ejection fraction is 45-50%  (mildly reduced). Grade I or early diastolic dysfunction. Mild diffuse  hypokinesis is present.  Global right ventricular function is normal. The right ventricle is normal  size.  No significant valvular abnormalities.  The estimated PA systolic pressure is 35 mmHg.  IVC diameter >2.1 cm collapsing <50% with sniff suggests a high RA pressure  estimated at 15 mmHg or greater.  This study was compared with the study from 05/02/2022. The RA pressure is  higher. No significant changes in the biventricular function.    5/2/22 Echo:   Interpretation Summary  Left ventricular function is decreased. The ejection fraction is 45-50% (mildly reduced).  The right ventricle is normal size.  Global right ventricular function is normal.  No significant valvular abnormalities were noted.  The inferior vena cava was normal in size with preserved respiratory variability.  No pericardial effusion is present.    7/18/22 CMR:  1. The LV is normal in cavity size and wall thickness. The global systolic function is mildly reduced. The LVEF is 46%. There are no regional wall motion abnormalities.  2. The RV is normal in cavity size. The global systolic function is mildly reduced. The RVEF is 47%.   3. Both atria are severely enlarged.  4. There is no significant valvular disease.   5. Late gadolinium enhancement imaging shows mid-myocardial enhancement in the septum. This is often seen in non-ischemic DCM, and is not typical for dystrophin-related CM (typically involves posterior wall). There is no myocardial edema.   6. Regadenoson stress perfusion imaging shows no ischemia.  7. There is no pericardial effusion or thickening.  8. There is no intracardiac thrombus.  CONCLUSIONS: Mild NICM, LVEF 46% and RVEF 47%. No myocardial ischemia, with mild non-ischemic fibrosis in  the septum.     2/20/23 CMR:  1. The LV is normal in cavity size and wall thickness. The  global systolic function is low normal. The LVEF  is 52%. There are no regional wall motion abnormalities.  2. The RV is normal in cavity size. The global systolic function is low normal. The RVEF is 51%.   3. Mild right atrial enlargement. Left atria is normal in size.  4. There is no significant valvular disease.   5. Late gadolinium enhancement imaging shows small area of mid myocardial enhancement of the basal to mid  septum.   6. There is no pericardial effusion or thickening.  CONCLUSIONS: Low normal biventricular function. LV EF 52%. RVEF 51% minimal improvement from prior study.  No significant valvular disease. Mild non-ischemic fibrosis in the septum unchanged from prior study.           Physical Examination   There were no vitals taken for this visit.  Wt Readings from Last 3 Encounters:   01/22/24 102.1 kg (225 lb)   01/22/24 102.5 kg (225 lb 14.4 oz)   08/30/23 101.9 kg (224 lb 9.6 oz)   There were no vitals taken for this visit.    The rest of a comprehensive physical examination is deferred due to nature of video visit restrictions.  CONSITUTIONAL: no acute distress  HEENT: no icterus, no redness or discharge, neck supple  CV: no visible edema of visualized extremities. No JVD.   RESPIRATORY: respirations nonlabored, no cough  NEURO: AA&Ox3, speech fluent/appropriate, motor grossly nonfocal  PSYCH: cooperative, affect appropriate  DERM: no rashes on visualized face/neck/upper extremities         Medications  Allergies   Current Outpatient Medications   Medication Sig Dispense Refill    acetaminophen (TYLENOL) 500 MG tablet Take 1,000 mg by mouth      acetaminophen-codeine (TYLENOL #3) 300-30 MG tablet Take 1-2 tablets by mouth every 4 hours as needed for moderate to severe pain 10 tablet 0    bisacodyl (DULCOLAX) 5 MG EC tablet Take 5 mg by mouth (Patient not taking: Reported on 1/22/2024)      finasteride (PROSCAR) 5 MG tablet Take 1 tablet by mouth every morning      losartan (COZAAR) 25 MG tablet  Take 0.5 tablets (12.5 mg) by mouth daily 45 tablet 3    naproxen sodium 220 MG capsule Take 440 mg by mouth as needed (as needed)      nystatin-triamcinolone (MYCOLOG) 046508-5.1 UNIT/GM-% external ointment Apply topically 2 times daily      PEG 3350-KCl-NaBcb-NaCl-NaSulf 240 g SOLR 4,000 mLs (Patient not taking: Reported on 1/22/2024)      sildenafil (REVATIO) 20 MG tablet Take 20 mg by mouth daily as needed      tamsulosin (FLOMAX) 0.4 MG capsule TAKE ONE CAPSULE BY MOUTH AT BEDTIME      Allergies   Allergen Reactions    Sulfa Antibiotics Dermatitis, Headache and Photosensitivity         Lab Results (Personally Reviewed)    Chemistry/lipid CBC Cardiac Enzymes/BNP/TSH/INR   Lab Results   Component Value Date    BUN 14.6 01/22/2024     01/22/2024    CO2 27 01/22/2024     Creatinine   Date Value Ref Range Status   01/22/2024 0.53 (L) 0.67 - 1.17 mg/dL Final       Lab Results   Component Value Date    CHOL 220 (H) 04/25/2022    HDL 63 04/25/2022     (H) 04/25/2022      Lab Results   Component Value Date    WBC 8.8 01/22/2024    HGB 16.2 01/22/2024    HCT 46.6 01/22/2024    MCV 92 01/22/2024     01/22/2024    Lab Results   Component Value Date    TSH 2.60 01/10/2022

## 2024-08-21 NOTE — PATIENT INSTRUCTIONS
It was a pleasure to see you in clinic today, please do not hesitate to call us for questions or concerns.  Your next automatic remote pacemaker check from home is scheduled for 11/21/2024.    Donna Ambrose RN    Electrophysiology Nurse Clinician  [facility]    During Business Hours Please Call:  [phone]  After Hours Please Call:  [phone] - select option #4 and ask for job code [phone]

## 2024-08-22 LAB
MDC_IDC_EPISODE_DTM: NORMAL
MDC_IDC_EPISODE_DURATION: 11 S
MDC_IDC_EPISODE_DURATION: 1366 S
MDC_IDC_EPISODE_DURATION: 178 S
MDC_IDC_EPISODE_DURATION: 286 S
MDC_IDC_EPISODE_DURATION: 3807 S
MDC_IDC_EPISODE_DURATION: 47 S
MDC_IDC_EPISODE_DURATION: 6 S
MDC_IDC_EPISODE_ID: 757
MDC_IDC_EPISODE_ID: 758
MDC_IDC_EPISODE_ID: 759
MDC_IDC_EPISODE_ID: 760
MDC_IDC_EPISODE_ID: 761
MDC_IDC_EPISODE_ID: NORMAL
MDC_IDC_EPISODE_TYPE: NORMAL
MDC_IDC_LEAD_CONNECTION_STATUS: NORMAL
MDC_IDC_LEAD_IMPLANT_DT: NORMAL
MDC_IDC_LEAD_LOCATION: NORMAL
MDC_IDC_LEAD_LOCATION_DETAIL_1: NORMAL
MDC_IDC_LEAD_MFG: NORMAL
MDC_IDC_LEAD_MODEL: NORMAL
MDC_IDC_LEAD_POLARITY_TYPE: NORMAL
MDC_IDC_LEAD_SERIAL: NORMAL
MDC_IDC_LEAD_SPECIAL_FUNCTION: NORMAL
MDC_IDC_MSMT_BATTERY_DTM: NORMAL
MDC_IDC_MSMT_BATTERY_REMAINING_LONGEVITY: 153 MO
MDC_IDC_MSMT_BATTERY_RRT_TRIGGER: 2.6
MDC_IDC_MSMT_BATTERY_STATUS: NORMAL
MDC_IDC_MSMT_BATTERY_VOLTAGE: 3.05 V
MDC_IDC_MSMT_LEADCHNL_LV_IMPEDANCE_VALUE: 1159 OHM
MDC_IDC_MSMT_LEADCHNL_LV_IMPEDANCE_VALUE: 1197 OHM
MDC_IDC_MSMT_LEADCHNL_LV_IMPEDANCE_VALUE: 1235 OHM
MDC_IDC_MSMT_LEADCHNL_LV_IMPEDANCE_VALUE: 380 OHM
MDC_IDC_MSMT_LEADCHNL_LV_IMPEDANCE_VALUE: 418 OHM
MDC_IDC_MSMT_LEADCHNL_LV_IMPEDANCE_VALUE: 551 OHM
MDC_IDC_MSMT_LEADCHNL_LV_IMPEDANCE_VALUE: 703 OHM
MDC_IDC_MSMT_LEADCHNL_LV_IMPEDANCE_VALUE: 855 OHM
MDC_IDC_MSMT_LEADCHNL_LV_IMPEDANCE_VALUE: 912 OHM
MDC_IDC_MSMT_LEADCHNL_LV_IMPEDANCE_VALUE: 912 OHM
MDC_IDC_MSMT_LEADCHNL_LV_PACING_THRESHOLD_AMPLITUDE: 1.25 V
MDC_IDC_MSMT_LEADCHNL_LV_PACING_THRESHOLD_PULSEWIDTH: 0.8 MS
MDC_IDC_MSMT_LEADCHNL_RA_IMPEDANCE_VALUE: 342 OHM
MDC_IDC_MSMT_LEADCHNL_RA_IMPEDANCE_VALUE: 456 OHM
MDC_IDC_MSMT_LEADCHNL_RA_PACING_THRESHOLD_AMPLITUDE: 0.5 V
MDC_IDC_MSMT_LEADCHNL_RA_PACING_THRESHOLD_PULSEWIDTH: 0.4 MS
MDC_IDC_MSMT_LEADCHNL_RA_SENSING_INTR_AMPL: 2.75 MV
MDC_IDC_MSMT_LEADCHNL_RV_IMPEDANCE_VALUE: 342 OHM
MDC_IDC_MSMT_LEADCHNL_RV_IMPEDANCE_VALUE: 418 OHM
MDC_IDC_MSMT_LEADCHNL_RV_PACING_THRESHOLD_AMPLITUDE: 0.5 V
MDC_IDC_MSMT_LEADCHNL_RV_PACING_THRESHOLD_PULSEWIDTH: 0.4 MS
MDC_IDC_MSMT_LEADCHNL_RV_SENSING_INTR_AMPL: 14.1 MV
MDC_IDC_PG_IMPLANT_DTM: NORMAL
MDC_IDC_PG_MFG: NORMAL
MDC_IDC_PG_MODEL: NORMAL
MDC_IDC_PG_SERIAL: NORMAL
MDC_IDC_PG_TYPE: NORMAL
MDC_IDC_SESS_CLINIC_NAME: NORMAL
MDC_IDC_SESS_DTM: NORMAL
MDC_IDC_SESS_TYPE: NORMAL
MDC_IDC_SET_BRADY_AT_MODE_SWITCH_RATE: 171 {BEATS}/MIN
MDC_IDC_SET_BRADY_LOWRATE: 50 {BEATS}/MIN
MDC_IDC_SET_BRADY_MAX_SENSOR_RATE: 130 {BEATS}/MIN
MDC_IDC_SET_BRADY_MAX_TRACKING_RATE: 130 {BEATS}/MIN
MDC_IDC_SET_BRADY_MODE: NORMAL
MDC_IDC_SET_BRADY_PAV_DELAY_HIGH: 100 MS
MDC_IDC_SET_BRADY_PAV_DELAY_LOW: 350 MS
MDC_IDC_SET_BRADY_SAV_DELAY_HIGH: 70 MS
MDC_IDC_SET_BRADY_SAV_DELAY_LOW: 300 MS
MDC_IDC_SET_CRT_LVRV_DELAY: 0 MS
MDC_IDC_SET_CRT_PACED_CHAMBERS: NORMAL
MDC_IDC_SET_LEADCHNL_LV_PACING_AMPLITUDE: 1.75 V
MDC_IDC_SET_LEADCHNL_LV_PACING_ANODE_ELECTRODE_1: NORMAL
MDC_IDC_SET_LEADCHNL_LV_PACING_ANODE_LOCATION_1: NORMAL
MDC_IDC_SET_LEADCHNL_LV_PACING_CAPTURE_MODE: NORMAL
MDC_IDC_SET_LEADCHNL_LV_PACING_CATHODE_ELECTRODE_1: NORMAL
MDC_IDC_SET_LEADCHNL_LV_PACING_CATHODE_LOCATION_1: NORMAL
MDC_IDC_SET_LEADCHNL_LV_PACING_POLARITY: NORMAL
MDC_IDC_SET_LEADCHNL_LV_PACING_PULSEWIDTH: 0.8 MS
MDC_IDC_SET_LEADCHNL_RA_PACING_AMPLITUDE: 1.5 V
MDC_IDC_SET_LEADCHNL_RA_PACING_ANODE_ELECTRODE_1: NORMAL
MDC_IDC_SET_LEADCHNL_RA_PACING_ANODE_LOCATION_1: NORMAL
MDC_IDC_SET_LEADCHNL_RA_PACING_CAPTURE_MODE: NORMAL
MDC_IDC_SET_LEADCHNL_RA_PACING_CATHODE_ELECTRODE_1: NORMAL
MDC_IDC_SET_LEADCHNL_RA_PACING_CATHODE_LOCATION_1: NORMAL
MDC_IDC_SET_LEADCHNL_RA_PACING_POLARITY: NORMAL
MDC_IDC_SET_LEADCHNL_RA_PACING_PULSEWIDTH: 0.4 MS
MDC_IDC_SET_LEADCHNL_RA_SENSING_ANODE_ELECTRODE_1: NORMAL
MDC_IDC_SET_LEADCHNL_RA_SENSING_ANODE_LOCATION_1: NORMAL
MDC_IDC_SET_LEADCHNL_RA_SENSING_CATHODE_ELECTRODE_1: NORMAL
MDC_IDC_SET_LEADCHNL_RA_SENSING_CATHODE_LOCATION_1: NORMAL
MDC_IDC_SET_LEADCHNL_RA_SENSING_POLARITY: NORMAL
MDC_IDC_SET_LEADCHNL_RA_SENSING_SENSITIVITY: 0.3 MV
MDC_IDC_SET_LEADCHNL_RV_PACING_AMPLITUDE: 2 V
MDC_IDC_SET_LEADCHNL_RV_PACING_ANODE_ELECTRODE_1: NORMAL
MDC_IDC_SET_LEADCHNL_RV_PACING_ANODE_LOCATION_1: NORMAL
MDC_IDC_SET_LEADCHNL_RV_PACING_CAPTURE_MODE: NORMAL
MDC_IDC_SET_LEADCHNL_RV_PACING_CATHODE_ELECTRODE_1: NORMAL
MDC_IDC_SET_LEADCHNL_RV_PACING_CATHODE_LOCATION_1: NORMAL
MDC_IDC_SET_LEADCHNL_RV_PACING_POLARITY: NORMAL
MDC_IDC_SET_LEADCHNL_RV_PACING_PULSEWIDTH: 0.4 MS
MDC_IDC_SET_LEADCHNL_RV_SENSING_ANODE_ELECTRODE_1: NORMAL
MDC_IDC_SET_LEADCHNL_RV_SENSING_ANODE_LOCATION_1: NORMAL
MDC_IDC_SET_LEADCHNL_RV_SENSING_CATHODE_ELECTRODE_1: NORMAL
MDC_IDC_SET_LEADCHNL_RV_SENSING_CATHODE_LOCATION_1: NORMAL
MDC_IDC_SET_LEADCHNL_RV_SENSING_POLARITY: NORMAL
MDC_IDC_SET_LEADCHNL_RV_SENSING_SENSITIVITY: 0.9 MV
MDC_IDC_SET_ZONE_DETECTION_INTERVAL: 350 MS
MDC_IDC_SET_ZONE_DETECTION_INTERVAL: 400 MS
MDC_IDC_SET_ZONE_STATUS: NORMAL
MDC_IDC_SET_ZONE_STATUS: NORMAL
MDC_IDC_SET_ZONE_TYPE: NORMAL
MDC_IDC_SET_ZONE_VENDOR_TYPE: NORMAL
MDC_IDC_STAT_AT_BURDEN_PERCENT: 0 %
MDC_IDC_STAT_AT_DTM_END: NORMAL
MDC_IDC_STAT_AT_DTM_START: NORMAL
MDC_IDC_STAT_BRADY_AP_VP_PERCENT: 1.85 %
MDC_IDC_STAT_BRADY_AP_VS_PERCENT: 8.63 %
MDC_IDC_STAT_BRADY_AS_VP_PERCENT: 10.02 %
MDC_IDC_STAT_BRADY_AS_VS_PERCENT: 79.51 %
MDC_IDC_STAT_BRADY_DTM_END: NORMAL
MDC_IDC_STAT_BRADY_DTM_START: NORMAL
MDC_IDC_STAT_BRADY_RA_PERCENT_PACED: 11.14 %
MDC_IDC_STAT_BRADY_RV_PERCENT_PACED: 11.87 %
MDC_IDC_STAT_CRT_DTM_END: NORMAL
MDC_IDC_STAT_CRT_DTM_START: NORMAL
MDC_IDC_STAT_CRT_LV_PERCENT_PACED: 11.8 %
MDC_IDC_STAT_CRT_PERCENT_PACED: 11.8 %
MDC_IDC_STAT_EPISODE_RECENT_COUNT: 0
MDC_IDC_STAT_EPISODE_RECENT_COUNT: 6
MDC_IDC_STAT_EPISODE_RECENT_COUNT_DTM_END: NORMAL
MDC_IDC_STAT_EPISODE_RECENT_COUNT_DTM_START: NORMAL
MDC_IDC_STAT_EPISODE_TOTAL_COUNT: 0
MDC_IDC_STAT_EPISODE_TOTAL_COUNT: 11
MDC_IDC_STAT_EPISODE_TOTAL_COUNT_DTM_END: NORMAL
MDC_IDC_STAT_EPISODE_TOTAL_COUNT_DTM_START: NORMAL
MDC_IDC_STAT_EPISODE_TYPE: NORMAL
MDC_IDC_STAT_TACHYTHERAPY_RECENT_DTM_END: NORMAL
MDC_IDC_STAT_TACHYTHERAPY_RECENT_DTM_START: NORMAL
MDC_IDC_STAT_TACHYTHERAPY_TOTAL_DTM_END: NORMAL
MDC_IDC_STAT_TACHYTHERAPY_TOTAL_DTM_START: NORMAL

## 2024-09-25 ENCOUNTER — TELEPHONE (OUTPATIENT)
Dept: CARDIOLOGY | Facility: CLINIC | Age: 69
End: 2024-09-25
Payer: COMMERCIAL

## 2024-09-25 NOTE — TELEPHONE ENCOUNTER
----- Message from Dipika RICARDO sent at 9/25/2024  3:40 PM CDT -----  Regarding: RE: Dr. Ashton 2025 Appts  Hi Hattie-    Yes, he does also need a cMRI scheduled prior to seeing Dr. Ashton, raphaelry I missed that.     I've extended the orders:)     Dipika  ----- Message -----  From: Hattie Sanz  Sent: 9/25/2024   3:36 PM CDT  To: Joan Tolbert, ROXANA; Debby Foster, RN; #  Subject: RE: Dr. Ashton 2025 Appts                        I have pt scheduled for 1/27 at 1PM for labs, 1:30PM for device check, and 2PM w/ Dr. Ashton. I see that there's orders for an MRI also- does that also need to be scheduled still? Can you please extend the active request for follow-up w/ Dr. Ashton so I can link appt? Let me know if there's anything else I can do for this pt.    Thanks,    Hattie Sanz  ----- Message -----  From: Dipika Parker RN  Sent: 9/25/2024   1:42 PM CDT  To: Joan Tolbert RN; Debby Foster, RN; #  Subject: Dr. Ashton 2025 Appts                            Dr. Daisha Berg's template for 2025 is now open! Yay!     Would you please contact this patient and schedule him for Return Muscular Dystrophy with Dr. Ashton in January with labs prior;  Pt is seeing Dr. Camara 1/27, please try to get Dr. Ashton on the same day!     Thanks,   Dipika

## 2024-09-25 NOTE — TELEPHONE ENCOUNTER
9/25/2024 3:38PM Hattie Sanz  Patient confirmed scheduled appointment:  Date: 1/27/2025  Time: 2PM  Visit type: Return Muscular Dystrophy  Provider: Dr. Ashton  [place] [address]rd [place] [address]  Testing/imaging: Return Muscular Dystrophy w/ Dr. Rodriguez (3rd Floor) prior at 11:20AM, Labs (1st Floor Imaging) prior at 1PM, Device Check (3rd Floor) prior at 1:30PM.  Additional notes: 9/25 Scheduled RMD w/ Dr. Ashton w/ labs and device check prior 1/27/2025. Sent message to Dipika Parker to see if pt needs Cardiac MRI prior and to extend active request w/ Dr. Ashton to link appt. DONAVAN   Hattie Sanz 9/25/2024 3:38PM        9/25/2024 1:54PM Arleth Collins  9/25 Left Voicemail (1st Attempt) and Sent Mychart (1st Attempt) for the patient to call back and schedule the following:    Appointment type: Return Muscular Dystrophy  Provider: Daisha  Return date: 1/27/2025  Specialty phone number: [phone] opt 1  Additional appointment(s) needed: labs prior   Additonal Notes: n/a   Arleth Collins 9/25/2024 1:54PM   68-year-old female with history of high cholesterol, depression presents to the ED s/p getting splinter inside left hand 4 to 5 days ago. Patient states has swelling and pus to the area now. No fever, chills or weakness. Unknown last tetanus shot.

## 2024-09-25 NOTE — TELEPHONE ENCOUNTER
10/10/2024 4:50PM Hattie Sanz  Patient has not yet confirmed rescheduled appointment:  Date: 1/13/2025  Time: 11:15AM  Visit type: MRI Cardiac w/ Contrast  Provider: Leonardo Ashton  Location: [facility], [address] [place] [id number]  Testing/imaging: CXR prior at 10AM  Additional notes: 10/10 MAX ATTEMPTS REACHED- LVM and sent letter so pt is aware of MRI Cardiac w/ Contrast schedule change on 1/13. Gave pt cb number if he wants to provide us w/ his local clinic details and we can check if his local clinic can do a CMRI. DONAVAN Sanz 10/10/2024 4:50PM        10/7/2024 3:51PM Hattie Sanz  Patient has not yet confirmed rescheduled appointment:  Date: 1/13/2025  Time: 11:15AM  Visit type: MRI Cardiac W/ Contrast  Provider: Leonardo Ashton  Location: [facility], [address] [place] [address]  Testing/imaging: CXR prior at 10AM  Additional notes: 10/7 LVM and MYC to let pt know MRI and CXR on 1/13/2025 were scheduled incorrectly and have been rescheduled to 11:15AM on 1/13/2025. We can also reschedule MRI and CXR to 1/24/2025 if pt would like to do a long weekend in the [facility]. Requested if pt would like to have CMRI at local clinic to provide us w/ clinic information. Once we have clinic information, we can check if they do Cardiac MRIs for pt's who have implanted pacemakers. Otherwise, he will need to complete it at [facility]. DONAVAN Sanz 10/7/2024 3:51PM        9/30/2024 5:49PM Hattie Sanz  Patient confirmed scheduled appointment:  Date: 1/13/2025  Time: 2PM  Visit type: CXR  Provider: Leonardo Ashton  Location: [facility], [address] [place] [address]  Testing/imaging: MRI Cardiac w/ Contrast at 3PM  Additional notes: 9/30 Scheduled MRI Cardiac w/ contrast 1/13/2025. Pt wanting to get MRI closer to home if possible. Sent message to  Joan GONZALEZ to see if we can accommodate. DONAVAN Sanz 9/30/2024 5:49PM        9/27/2024 10:07AM Hattie Yvon  Left Voicemail (2nd Attempt) MAX ATTEMPTS REACHED for the patient to call back and schedule the following:    Appointment type: MRI Cardiac w/ contrast  Provider: Per Dr. Ashton  Return date: Prior to 1/27/2025 appt w/ Dr. Ashton  Specialty phone number: [phone]  Additional appointment(s) needed: NA  Additonal Notes: 9/27 MAX ATTEMPTS REACHED- LVM and sent letter for pt to schedule MRI Cardiac w/ contrast prior to 1/27/2025 appt w/ Dr. Ashton. DONAVAN Sanz 9/27/2024 10:07AM        9/25/2024 3:57PM Hattie Yvon  Left Voicemail (1st Attempt) and Sent Mychart (1st Attempt) for the patient to call back and schedule the following:    Appointment type: MRI Cardiac w/ contrast  Provider: Dr. Ashton  Return date: Prior to 1/27/2025 appt w/ Dr. Ashton  Specialty phone number: [phone]  Additional appointment(s) needed: NA  Additonal Notes: 9/25 LVM and MYC for pt to schedule MRI Cardiac w/ contrast prior to 1/27/2025 appt w/ Dr. Ashton. DONAVAN   Hattie Yvon 9/25/2024 3:57PM

## 2024-09-27 ENCOUNTER — TELEPHONE (OUTPATIENT)
Dept: MRI IMAGING | Facility: CLINIC | Age: 69
End: 2024-09-27
Payer: COMMERCIAL

## 2024-09-27 DIAGNOSIS — Z95.810 ICD (IMPLANTABLE CARDIOVERTER-DEFIBRILLATOR) IN PLACE: ICD-10-CM

## 2024-09-27 DIAGNOSIS — G71.11 MYOTONIC DYSTROPHY, TYPE 2 (H): Primary | ICD-10-CM

## 2024-09-27 NOTE — TELEPHONE ENCOUNTER
Reason for call: MR Device Safety Clearance    Please create a MR Device Safety order  Patient is reporting patient has Pacemaker  Type of MR exam: MRI CARDIAC W/O & WITH CONTRAST      Do you get your Device checked at [facility]?: Yes - [facility]

## 2024-09-30 NOTE — TELEPHONE ENCOUNTER
Is the implanted device MRI conditional: Yes    Please schedule the MRI: Yes    Does the patient need a CXR prior to MRI: Yes, order 2 view CXR please.    Device: Medtronic [id number] Percepta Quad CRT-P

## 2024-10-11 ENCOUNTER — TELEPHONE (OUTPATIENT)
Dept: CARDIOLOGY | Facility: CLINIC | Age: 69
End: 2024-10-11
Payer: COMMERCIAL

## 2024-10-11 NOTE — TELEPHONE ENCOUNTER
[facility] Call Center    Phone Message    May a detailed message be left on voicemail: yes     Reason for Call: Other: Patient would like MR and xray closer [place]. Please fax orders to : [phone] gaye Wills [facility] in [facility]. Please include dx and face sheet.      Action Taken: Other: cardio    Travel Screening: Not Applicable     Date of Service:

## 2024-11-21 ENCOUNTER — ANCILLARY PROCEDURE (OUTPATIENT)
Dept: CARDIOLOGY | Facility: CLINIC | Age: 69
End: 2024-11-21
Attending: INTERNAL MEDICINE
Payer: COMMERCIAL

## 2024-11-21 DIAGNOSIS — Z95.0 CARDIAC RESYNCHRONIZATION THERAPY PACEMAKER (CRT-P) IN PLACE: ICD-10-CM

## 2024-11-21 PROCEDURE — 93296 REM INTERROG EVL PM/IDS: CPT

## 2024-11-21 PROCEDURE — 93294 REM INTERROG EVL PM/LDLS PM: CPT | Performed by: INTERNAL MEDICINE

## 2024-12-07 LAB
MDC_IDC_EPISODE_DTM: NORMAL
MDC_IDC_EPISODE_DURATION: 1069 S
MDC_IDC_EPISODE_DURATION: 2 S
MDC_IDC_EPISODE_DURATION: 231 S
MDC_IDC_EPISODE_DURATION: 387 S
MDC_IDC_EPISODE_DURATION: 43 S
MDC_IDC_EPISODE_DURATION: 543 S
MDC_IDC_EPISODE_DURATION: 60 S
MDC_IDC_EPISODE_DURATION: 861 S
MDC_IDC_EPISODE_DURATION: 90 S
MDC_IDC_EPISODE_ID: 1030
MDC_IDC_EPISODE_ID: 1031
MDC_IDC_EPISODE_ID: 1032
MDC_IDC_EPISODE_ID: 1033
MDC_IDC_EPISODE_ID: 1034
MDC_IDC_EPISODE_ID: 12
MDC_IDC_EPISODE_ID: 13
MDC_IDC_EPISODE_ID: NORMAL
MDC_IDC_EPISODE_TYPE: NORMAL
MDC_IDC_LEAD_CONNECTION_STATUS: NORMAL
MDC_IDC_LEAD_IMPLANT_DT: NORMAL
MDC_IDC_LEAD_LOCATION: NORMAL
MDC_IDC_LEAD_LOCATION_DETAIL_1: NORMAL
MDC_IDC_LEAD_MFG: NORMAL
MDC_IDC_LEAD_MODEL: NORMAL
MDC_IDC_LEAD_POLARITY_TYPE: NORMAL
MDC_IDC_LEAD_SERIAL: NORMAL
MDC_IDC_LEAD_SPECIAL_FUNCTION: NORMAL
MDC_IDC_MSMT_BATTERY_DTM: NORMAL
MDC_IDC_MSMT_BATTERY_REMAINING_LONGEVITY: 150 MO
MDC_IDC_MSMT_BATTERY_RRT_TRIGGER: 2.6
MDC_IDC_MSMT_BATTERY_STATUS: NORMAL
MDC_IDC_MSMT_BATTERY_VOLTAGE: 3.04 V
MDC_IDC_MSMT_LEADCHNL_LV_IMPEDANCE_VALUE: 1083 OHM
MDC_IDC_MSMT_LEADCHNL_LV_IMPEDANCE_VALUE: 1178 OHM
MDC_IDC_MSMT_LEADCHNL_LV_IMPEDANCE_VALUE: 1254 OHM
MDC_IDC_MSMT_LEADCHNL_LV_IMPEDANCE_VALUE: 361 OHM
MDC_IDC_MSMT_LEADCHNL_LV_IMPEDANCE_VALUE: 399 OHM
MDC_IDC_MSMT_LEADCHNL_LV_IMPEDANCE_VALUE: 608 OHM
MDC_IDC_MSMT_LEADCHNL_LV_IMPEDANCE_VALUE: 665 OHM
MDC_IDC_MSMT_LEADCHNL_LV_IMPEDANCE_VALUE: 836 OHM
MDC_IDC_MSMT_LEADCHNL_LV_IMPEDANCE_VALUE: 836 OHM
MDC_IDC_MSMT_LEADCHNL_LV_IMPEDANCE_VALUE: 950 OHM
MDC_IDC_MSMT_LEADCHNL_LV_PACING_THRESHOLD_AMPLITUDE: 1.12 V
MDC_IDC_MSMT_LEADCHNL_LV_PACING_THRESHOLD_PULSEWIDTH: 0.8 MS
MDC_IDC_MSMT_LEADCHNL_RA_IMPEDANCE_VALUE: 304 OHM
MDC_IDC_MSMT_LEADCHNL_RA_IMPEDANCE_VALUE: 418 OHM
MDC_IDC_MSMT_LEADCHNL_RA_PACING_THRESHOLD_AMPLITUDE: 0.5 V
MDC_IDC_MSMT_LEADCHNL_RA_PACING_THRESHOLD_PULSEWIDTH: 0.4 MS
MDC_IDC_MSMT_LEADCHNL_RA_SENSING_INTR_AMPL: 3.75 MV
MDC_IDC_MSMT_LEADCHNL_RV_IMPEDANCE_VALUE: 361 OHM
MDC_IDC_MSMT_LEADCHNL_RV_IMPEDANCE_VALUE: 418 OHM
MDC_IDC_MSMT_LEADCHNL_RV_PACING_THRESHOLD_AMPLITUDE: 0.62 V
MDC_IDC_MSMT_LEADCHNL_RV_PACING_THRESHOLD_PULSEWIDTH: 0.4 MS
MDC_IDC_MSMT_LEADCHNL_RV_SENSING_INTR_AMPL: 13.62 MV
MDC_IDC_PG_IMPLANT_DTM: NORMAL
MDC_IDC_PG_MFG: NORMAL
MDC_IDC_PG_MODEL: NORMAL
MDC_IDC_PG_SERIAL: NORMAL
MDC_IDC_PG_TYPE: NORMAL
MDC_IDC_SESS_CLINIC_NAME: NORMAL
MDC_IDC_SESS_DTM: NORMAL
MDC_IDC_SESS_TYPE: NORMAL
MDC_IDC_SET_BRADY_AT_MODE_SWITCH_RATE: 171 {BEATS}/MIN
MDC_IDC_SET_BRADY_LOWRATE: 50 {BEATS}/MIN
MDC_IDC_SET_BRADY_MAX_SENSOR_RATE: 130 {BEATS}/MIN
MDC_IDC_SET_BRADY_MAX_TRACKING_RATE: 130 {BEATS}/MIN
MDC_IDC_SET_BRADY_MODE: NORMAL
MDC_IDC_SET_BRADY_PAV_DELAY_HIGH: 100 MS
MDC_IDC_SET_BRADY_PAV_DELAY_LOW: 350 MS
MDC_IDC_SET_BRADY_SAV_DELAY_HIGH: 70 MS
MDC_IDC_SET_BRADY_SAV_DELAY_LOW: 300 MS
MDC_IDC_SET_CRT_LVRV_DELAY: 0 MS
MDC_IDC_SET_CRT_PACED_CHAMBERS: NORMAL
MDC_IDC_SET_LEADCHNL_LV_PACING_AMPLITUDE: 1.75 V
MDC_IDC_SET_LEADCHNL_LV_PACING_ANODE_ELECTRODE_1: NORMAL
MDC_IDC_SET_LEADCHNL_LV_PACING_ANODE_LOCATION_1: NORMAL
MDC_IDC_SET_LEADCHNL_LV_PACING_CAPTURE_MODE: NORMAL
MDC_IDC_SET_LEADCHNL_LV_PACING_CATHODE_ELECTRODE_1: NORMAL
MDC_IDC_SET_LEADCHNL_LV_PACING_CATHODE_LOCATION_1: NORMAL
MDC_IDC_SET_LEADCHNL_LV_PACING_POLARITY: NORMAL
MDC_IDC_SET_LEADCHNL_LV_PACING_PULSEWIDTH: 0.8 MS
MDC_IDC_SET_LEADCHNL_RA_PACING_AMPLITUDE: 1.5 V
MDC_IDC_SET_LEADCHNL_RA_PACING_ANODE_ELECTRODE_1: NORMAL
MDC_IDC_SET_LEADCHNL_RA_PACING_ANODE_LOCATION_1: NORMAL
MDC_IDC_SET_LEADCHNL_RA_PACING_CAPTURE_MODE: NORMAL
MDC_IDC_SET_LEADCHNL_RA_PACING_CATHODE_ELECTRODE_1: NORMAL
MDC_IDC_SET_LEADCHNL_RA_PACING_CATHODE_LOCATION_1: NORMAL
MDC_IDC_SET_LEADCHNL_RA_PACING_POLARITY: NORMAL
MDC_IDC_SET_LEADCHNL_RA_PACING_PULSEWIDTH: 0.4 MS
MDC_IDC_SET_LEADCHNL_RA_SENSING_ANODE_ELECTRODE_1: NORMAL
MDC_IDC_SET_LEADCHNL_RA_SENSING_ANODE_LOCATION_1: NORMAL
MDC_IDC_SET_LEADCHNL_RA_SENSING_CATHODE_ELECTRODE_1: NORMAL
MDC_IDC_SET_LEADCHNL_RA_SENSING_CATHODE_LOCATION_1: NORMAL
MDC_IDC_SET_LEADCHNL_RA_SENSING_POLARITY: NORMAL
MDC_IDC_SET_LEADCHNL_RA_SENSING_SENSITIVITY: 0.3 MV
MDC_IDC_SET_LEADCHNL_RV_PACING_AMPLITUDE: 2 V
MDC_IDC_SET_LEADCHNL_RV_PACING_ANODE_ELECTRODE_1: NORMAL
MDC_IDC_SET_LEADCHNL_RV_PACING_ANODE_LOCATION_1: NORMAL
MDC_IDC_SET_LEADCHNL_RV_PACING_CAPTURE_MODE: NORMAL
MDC_IDC_SET_LEADCHNL_RV_PACING_CATHODE_ELECTRODE_1: NORMAL
MDC_IDC_SET_LEADCHNL_RV_PACING_CATHODE_LOCATION_1: NORMAL
MDC_IDC_SET_LEADCHNL_RV_PACING_POLARITY: NORMAL
MDC_IDC_SET_LEADCHNL_RV_PACING_PULSEWIDTH: 0.4 MS
MDC_IDC_SET_LEADCHNL_RV_SENSING_ANODE_ELECTRODE_1: NORMAL
MDC_IDC_SET_LEADCHNL_RV_SENSING_ANODE_LOCATION_1: NORMAL
MDC_IDC_SET_LEADCHNL_RV_SENSING_CATHODE_ELECTRODE_1: NORMAL
MDC_IDC_SET_LEADCHNL_RV_SENSING_CATHODE_LOCATION_1: NORMAL
MDC_IDC_SET_LEADCHNL_RV_SENSING_POLARITY: NORMAL
MDC_IDC_SET_LEADCHNL_RV_SENSING_SENSITIVITY: 0.9 MV
MDC_IDC_SET_ZONE_DETECTION_INTERVAL: 350 MS
MDC_IDC_SET_ZONE_DETECTION_INTERVAL: 400 MS
MDC_IDC_SET_ZONE_STATUS: NORMAL
MDC_IDC_SET_ZONE_STATUS: NORMAL
MDC_IDC_SET_ZONE_TYPE: NORMAL
MDC_IDC_SET_ZONE_VENDOR_TYPE: NORMAL
MDC_IDC_STAT_AT_BURDEN_PERCENT: 0.1 %
MDC_IDC_STAT_AT_DTM_END: NORMAL
MDC_IDC_STAT_AT_DTM_START: NORMAL
MDC_IDC_STAT_BRADY_AP_VP_PERCENT: 3.76 %
MDC_IDC_STAT_BRADY_AP_VS_PERCENT: 5.27 %
MDC_IDC_STAT_BRADY_AS_VP_PERCENT: 12.42 %
MDC_IDC_STAT_BRADY_AS_VS_PERCENT: 78.54 %
MDC_IDC_STAT_BRADY_DTM_END: NORMAL
MDC_IDC_STAT_BRADY_DTM_START: NORMAL
MDC_IDC_STAT_BRADY_RA_PERCENT_PACED: 9.39 %
MDC_IDC_STAT_BRADY_RV_PERCENT_PACED: 16.18 %
MDC_IDC_STAT_CRT_DTM_END: NORMAL
MDC_IDC_STAT_CRT_DTM_START: NORMAL
MDC_IDC_STAT_CRT_LV_PERCENT_PACED: 16.13 %
MDC_IDC_STAT_CRT_PERCENT_PACED: 16.13 %
MDC_IDC_STAT_EPISODE_RECENT_COUNT: 0
MDC_IDC_STAT_EPISODE_RECENT_COUNT: 1
MDC_IDC_STAT_EPISODE_RECENT_COUNT: 1
MDC_IDC_STAT_EPISODE_RECENT_COUNT_DTM_END: NORMAL
MDC_IDC_STAT_EPISODE_RECENT_COUNT_DTM_START: NORMAL
MDC_IDC_STAT_EPISODE_TOTAL_COUNT: 0
MDC_IDC_STAT_EPISODE_TOTAL_COUNT: 1
MDC_IDC_STAT_EPISODE_TOTAL_COUNT: 12
MDC_IDC_STAT_EPISODE_TOTAL_COUNT_DTM_END: NORMAL
MDC_IDC_STAT_EPISODE_TOTAL_COUNT_DTM_START: NORMAL
MDC_IDC_STAT_EPISODE_TYPE: NORMAL
MDC_IDC_STAT_TACHYTHERAPY_RECENT_DTM_END: NORMAL
MDC_IDC_STAT_TACHYTHERAPY_RECENT_DTM_START: NORMAL
MDC_IDC_STAT_TACHYTHERAPY_TOTAL_DTM_END: NORMAL
MDC_IDC_STAT_TACHYTHERAPY_TOTAL_DTM_START: NORMAL

## 2025-01-13 ENCOUNTER — ANCILLARY PROCEDURE (OUTPATIENT)
Dept: CARDIOLOGY | Facility: CLINIC | Age: 70
End: 2025-01-13
Attending: INTERNAL MEDICINE
Payer: COMMERCIAL

## 2025-01-13 ENCOUNTER — HOSPITAL ENCOUNTER (OUTPATIENT)
Dept: GENERAL RADIOLOGY | Facility: CLINIC | Age: 70
Discharge: HOME | End: 2025-01-13
Attending: INTERNAL MEDICINE
Payer: COMMERCIAL

## 2025-01-13 ENCOUNTER — HOSPITAL ENCOUNTER (OUTPATIENT)
Dept: MRI IMAGING | Facility: CLINIC | Age: 70
Discharge: HOME | End: 2025-01-13
Attending: INTERNAL MEDICINE
Payer: COMMERCIAL

## 2025-01-13 VITALS — OXYGEN SATURATION: 100 % | HEART RATE: 70 BPM

## 2025-01-13 DIAGNOSIS — Z13.6 ENCOUNTER FOR SCREENING FOR CARDIOVASCULAR DISORDERS: ICD-10-CM

## 2025-01-13 DIAGNOSIS — Z45.02 FITTING AND ADJUSTMENT OF AUTOMATIC IMPLANTABLE CARDIOVERTER-DEFIBRILLATOR: ICD-10-CM

## 2025-01-13 DIAGNOSIS — G71.11 MYOTONIC DYSTROPHY, TYPE 2 (H): ICD-10-CM

## 2025-01-13 DIAGNOSIS — Z95.810 ICD (IMPLANTABLE CARDIOVERTER-DEFIBRILLATOR) IN PLACE: ICD-10-CM

## 2025-01-13 DIAGNOSIS — Z45.02 FITTING AND ADJUSTMENT OF AUTOMATIC IMPLANTABLE CARDIOVERTER-DEFIBRILLATOR: Primary | ICD-10-CM

## 2025-01-13 PROCEDURE — 71046 X-RAY EXAM CHEST 2 VIEWS: CPT

## 2025-01-13 PROCEDURE — 71046 X-RAY EXAM CHEST 2 VIEWS: CPT | Mod: 26 | Performed by: RADIOLOGY

## 2025-01-13 PROCEDURE — 75561 CARDIAC MRI FOR MORPH W/DYE: CPT | Mod: 26 | Performed by: RADIOLOGY

## 2025-01-13 PROCEDURE — 93286 PERI-PX EVAL PM/LDLS PM IP: CPT

## 2025-01-13 PROCEDURE — 75561 CARDIAC MRI FOR MORPH W/DYE: CPT

## 2025-01-13 PROCEDURE — 255N000002 HC RX 255 OP 636: Performed by: INTERNAL MEDICINE

## 2025-01-13 PROCEDURE — 93286 PERI-PX EVAL PM/LDLS PM IP: CPT | Mod: 26 | Performed by: INTERNAL MEDICINE

## 2025-01-13 PROCEDURE — A9585 GADOBUTROL INJECTION: HCPCS | Performed by: INTERNAL MEDICINE

## 2025-01-13 RX ORDER — GADOBUTROL 604.72 MG/ML
13 INJECTION INTRAVENOUS ONCE
Status: COMPLETED | OUTPATIENT
Start: 2025-01-13 | End: 2025-01-13

## 2025-01-13 RX ADMIN — GADOBUTROL 13 ML: 604.72 INJECTION INTRAVENOUS at 12:15

## 2025-01-14 LAB
MDC_IDC_EPISODE_DTM: NORMAL
MDC_IDC_EPISODE_DURATION: 10 S
MDC_IDC_EPISODE_DURATION: 12 S
MDC_IDC_EPISODE_DURATION: 12 S
MDC_IDC_EPISODE_DURATION: 13 S
MDC_IDC_EPISODE_DURATION: 131 S
MDC_IDC_EPISODE_DURATION: 132 S
MDC_IDC_EPISODE_DURATION: 16 S
MDC_IDC_EPISODE_DURATION: 16 S
MDC_IDC_EPISODE_DURATION: 1618 S
MDC_IDC_EPISODE_DURATION: 168 S
MDC_IDC_EPISODE_DURATION: 29 S
MDC_IDC_EPISODE_DURATION: 29 S
MDC_IDC_EPISODE_DURATION: 30 S
MDC_IDC_EPISODE_DURATION: 36 S
MDC_IDC_EPISODE_DURATION: 551 S
MDC_IDC_EPISODE_DURATION: 7 S
MDC_IDC_EPISODE_DURATION: 79 S
MDC_IDC_EPISODE_DURATION: 83 S
MDC_IDC_EPISODE_DURATION: 93 S
MDC_IDC_EPISODE_ID: 1160
MDC_IDC_EPISODE_ID: 1161
MDC_IDC_EPISODE_ID: 1162
MDC_IDC_EPISODE_ID: 1163
MDC_IDC_EPISODE_ID: 1164
MDC_IDC_EPISODE_ID: 14
MDC_IDC_EPISODE_ID: 15
MDC_IDC_EPISODE_ID: 16
MDC_IDC_EPISODE_ID: 17
MDC_IDC_EPISODE_ID: 18
MDC_IDC_EPISODE_ID: 19
MDC_IDC_EPISODE_ID: 20
MDC_IDC_EPISODE_ID: 21
MDC_IDC_EPISODE_ID: 22
MDC_IDC_EPISODE_ID: 23
MDC_IDC_EPISODE_ID: 24
MDC_IDC_EPISODE_ID: 25
MDC_IDC_EPISODE_ID: NORMAL
MDC_IDC_EPISODE_TYPE: NORMAL
MDC_IDC_EPISODE_TYPE_INDUCED: NO
MDC_IDC_LEAD_CONNECTION_STATUS: NORMAL
MDC_IDC_LEAD_IMPLANT_DT: NORMAL
MDC_IDC_LEAD_LOCATION: NORMAL
MDC_IDC_LEAD_LOCATION_DETAIL_1: NORMAL
MDC_IDC_LEAD_MFG: NORMAL
MDC_IDC_LEAD_MODEL: NORMAL
MDC_IDC_LEAD_POLARITY_TYPE: NORMAL
MDC_IDC_LEAD_SERIAL: NORMAL
MDC_IDC_LEAD_SPECIAL_FUNCTION: NORMAL
MDC_IDC_MSMT_BATTERY_DTM: NORMAL
MDC_IDC_MSMT_BATTERY_DTM: NORMAL
MDC_IDC_MSMT_BATTERY_REMAINING_LONGEVITY: 148 MO
MDC_IDC_MSMT_BATTERY_REMAINING_LONGEVITY: 149 MO
MDC_IDC_MSMT_BATTERY_RRT_TRIGGER: 2.6
MDC_IDC_MSMT_BATTERY_RRT_TRIGGER: 2.6
MDC_IDC_MSMT_BATTERY_STATUS: NORMAL
MDC_IDC_MSMT_BATTERY_STATUS: NORMAL
MDC_IDC_MSMT_BATTERY_VOLTAGE: 3.03 V
MDC_IDC_MSMT_BATTERY_VOLTAGE: 3.03 V
MDC_IDC_MSMT_LEADCHNL_LV_IMPEDANCE_VALUE: 1045 OHM
MDC_IDC_MSMT_LEADCHNL_LV_IMPEDANCE_VALUE: 1045 OHM
MDC_IDC_MSMT_LEADCHNL_LV_IMPEDANCE_VALUE: 1159 OHM
MDC_IDC_MSMT_LEADCHNL_LV_IMPEDANCE_VALUE: 1159 OHM
MDC_IDC_MSMT_LEADCHNL_LV_IMPEDANCE_VALUE: 1235 OHM
MDC_IDC_MSMT_LEADCHNL_LV_IMPEDANCE_VALUE: 1273 OHM
MDC_IDC_MSMT_LEADCHNL_LV_IMPEDANCE_VALUE: 380 OHM
MDC_IDC_MSMT_LEADCHNL_LV_IMPEDANCE_VALUE: 380 OHM
MDC_IDC_MSMT_LEADCHNL_LV_IMPEDANCE_VALUE: 418 OHM
MDC_IDC_MSMT_LEADCHNL_LV_IMPEDANCE_VALUE: 456 OHM
MDC_IDC_MSMT_LEADCHNL_LV_IMPEDANCE_VALUE: 551 OHM
MDC_IDC_MSMT_LEADCHNL_LV_IMPEDANCE_VALUE: 551 OHM
MDC_IDC_MSMT_LEADCHNL_LV_IMPEDANCE_VALUE: 722 OHM
MDC_IDC_MSMT_LEADCHNL_LV_IMPEDANCE_VALUE: 722 OHM
MDC_IDC_MSMT_LEADCHNL_LV_IMPEDANCE_VALUE: 836 OHM
MDC_IDC_MSMT_LEADCHNL_LV_IMPEDANCE_VALUE: 836 OHM
MDC_IDC_MSMT_LEADCHNL_LV_IMPEDANCE_VALUE: 893 OHM
MDC_IDC_MSMT_LEADCHNL_LV_IMPEDANCE_VALUE: 893 OHM
MDC_IDC_MSMT_LEADCHNL_LV_IMPEDANCE_VALUE: 912 OHM
MDC_IDC_MSMT_LEADCHNL_LV_IMPEDANCE_VALUE: 912 OHM
MDC_IDC_MSMT_LEADCHNL_LV_PACING_THRESHOLD_AMPLITUDE: 1.25 V
MDC_IDC_MSMT_LEADCHNL_LV_PACING_THRESHOLD_AMPLITUDE: 1.25 V
MDC_IDC_MSMT_LEADCHNL_LV_PACING_THRESHOLD_PULSEWIDTH: 0.8 MS
MDC_IDC_MSMT_LEADCHNL_LV_PACING_THRESHOLD_PULSEWIDTH: 0.8 MS
MDC_IDC_MSMT_LEADCHNL_RA_IMPEDANCE_VALUE: 342 OHM
MDC_IDC_MSMT_LEADCHNL_RA_IMPEDANCE_VALUE: 342 OHM
MDC_IDC_MSMT_LEADCHNL_RA_IMPEDANCE_VALUE: 475 OHM
MDC_IDC_MSMT_LEADCHNL_RA_IMPEDANCE_VALUE: 475 OHM
MDC_IDC_MSMT_LEADCHNL_RA_PACING_THRESHOLD_AMPLITUDE: 0.5 V
MDC_IDC_MSMT_LEADCHNL_RA_PACING_THRESHOLD_AMPLITUDE: 0.5 V
MDC_IDC_MSMT_LEADCHNL_RA_PACING_THRESHOLD_PULSEWIDTH: 0.4 MS
MDC_IDC_MSMT_LEADCHNL_RA_PACING_THRESHOLD_PULSEWIDTH: 0.4 MS
MDC_IDC_MSMT_LEADCHNL_RA_SENSING_INTR_AMPL: 3.62 MV
MDC_IDC_MSMT_LEADCHNL_RA_SENSING_INTR_AMPL: 3.62 MV
MDC_IDC_MSMT_LEADCHNL_RA_SENSING_INTR_AMPL: 3.88 MV
MDC_IDC_MSMT_LEADCHNL_RA_SENSING_INTR_AMPL: 4.12 MV
MDC_IDC_MSMT_LEADCHNL_RV_IMPEDANCE_VALUE: 361 OHM
MDC_IDC_MSMT_LEADCHNL_RV_IMPEDANCE_VALUE: 361 OHM
MDC_IDC_MSMT_LEADCHNL_RV_IMPEDANCE_VALUE: 437 OHM
MDC_IDC_MSMT_LEADCHNL_RV_IMPEDANCE_VALUE: 437 OHM
MDC_IDC_MSMT_LEADCHNL_RV_PACING_THRESHOLD_AMPLITUDE: 0.5 V
MDC_IDC_MSMT_LEADCHNL_RV_PACING_THRESHOLD_AMPLITUDE: 0.5 V
MDC_IDC_MSMT_LEADCHNL_RV_PACING_THRESHOLD_PULSEWIDTH: 0.4 MS
MDC_IDC_MSMT_LEADCHNL_RV_PACING_THRESHOLD_PULSEWIDTH: 0.4 MS
MDC_IDC_MSMT_LEADCHNL_RV_SENSING_INTR_AMPL: 13.62 MV
MDC_IDC_MSMT_LEADCHNL_RV_SENSING_INTR_AMPL: 13.62 MV
MDC_IDC_MSMT_LEADCHNL_RV_SENSING_INTR_AMPL: 15.38 MV
MDC_IDC_MSMT_LEADCHNL_RV_SENSING_INTR_AMPL: 17 MV
MDC_IDC_PG_IMPLANT_DTM: NORMAL
MDC_IDC_PG_IMPLANT_DTM: NORMAL
MDC_IDC_PG_MFG: NORMAL
MDC_IDC_PG_MFG: NORMAL
MDC_IDC_PG_MODEL: NORMAL
MDC_IDC_PG_MODEL: NORMAL
MDC_IDC_PG_SERIAL: NORMAL
MDC_IDC_PG_SERIAL: NORMAL
MDC_IDC_PG_TYPE: NORMAL
MDC_IDC_PG_TYPE: NORMAL
MDC_IDC_SESS_CLINIC_NAME: NORMAL
MDC_IDC_SESS_CLINIC_NAME: NORMAL
MDC_IDC_SESS_DTM: NORMAL
MDC_IDC_SESS_DTM: NORMAL
MDC_IDC_SESS_TYPE: NORMAL
MDC_IDC_SESS_TYPE: NORMAL
MDC_IDC_SET_BRADY_AT_MODE_SWITCH_RATE: 171 {BEATS}/MIN
MDC_IDC_SET_BRADY_AT_MODE_SWITCH_RATE: 171 {BEATS}/MIN
MDC_IDC_SET_BRADY_LOWRATE: 50 {BEATS}/MIN
MDC_IDC_SET_BRADY_LOWRATE: 50 {BEATS}/MIN
MDC_IDC_SET_BRADY_MAX_SENSOR_RATE: 130 {BEATS}/MIN
MDC_IDC_SET_BRADY_MAX_SENSOR_RATE: 130 {BEATS}/MIN
MDC_IDC_SET_BRADY_MAX_TRACKING_RATE: 130 {BEATS}/MIN
MDC_IDC_SET_BRADY_MAX_TRACKING_RATE: 130 {BEATS}/MIN
MDC_IDC_SET_BRADY_MODE: NORMAL
MDC_IDC_SET_BRADY_MODE: NORMAL
MDC_IDC_SET_BRADY_PAV_DELAY_HIGH: 100 MS
MDC_IDC_SET_BRADY_PAV_DELAY_HIGH: 100 MS
MDC_IDC_SET_BRADY_PAV_DELAY_LOW: 350 MS
MDC_IDC_SET_BRADY_PAV_DELAY_LOW: 350 MS
MDC_IDC_SET_BRADY_SAV_DELAY_HIGH: 70 MS
MDC_IDC_SET_BRADY_SAV_DELAY_HIGH: 70 MS
MDC_IDC_SET_BRADY_SAV_DELAY_LOW: 300 MS
MDC_IDC_SET_BRADY_SAV_DELAY_LOW: 300 MS
MDC_IDC_SET_CRT_LVRV_DELAY: 0 MS
MDC_IDC_SET_CRT_LVRV_DELAY: 0 MS
MDC_IDC_SET_CRT_PACED_CHAMBERS: NORMAL
MDC_IDC_SET_CRT_PACED_CHAMBERS: NORMAL
MDC_IDC_SET_LEADCHNL_LV_PACING_AMPLITUDE: 1.75 V
MDC_IDC_SET_LEADCHNL_LV_PACING_AMPLITUDE: 1.75 V
MDC_IDC_SET_LEADCHNL_LV_PACING_ANODE_ELECTRODE_1: NORMAL
MDC_IDC_SET_LEADCHNL_LV_PACING_ANODE_ELECTRODE_1: NORMAL
MDC_IDC_SET_LEADCHNL_LV_PACING_ANODE_LOCATION_1: NORMAL
MDC_IDC_SET_LEADCHNL_LV_PACING_ANODE_LOCATION_1: NORMAL
MDC_IDC_SET_LEADCHNL_LV_PACING_CAPTURE_MODE: NORMAL
MDC_IDC_SET_LEADCHNL_LV_PACING_CAPTURE_MODE: NORMAL
MDC_IDC_SET_LEADCHNL_LV_PACING_CATHODE_ELECTRODE_1: NORMAL
MDC_IDC_SET_LEADCHNL_LV_PACING_CATHODE_ELECTRODE_1: NORMAL
MDC_IDC_SET_LEADCHNL_LV_PACING_CATHODE_LOCATION_1: NORMAL
MDC_IDC_SET_LEADCHNL_LV_PACING_CATHODE_LOCATION_1: NORMAL
MDC_IDC_SET_LEADCHNL_LV_PACING_POLARITY: NORMAL
MDC_IDC_SET_LEADCHNL_LV_PACING_POLARITY: NORMAL
MDC_IDC_SET_LEADCHNL_LV_PACING_PULSEWIDTH: 0.8 MS
MDC_IDC_SET_LEADCHNL_LV_PACING_PULSEWIDTH: 0.8 MS
MDC_IDC_SET_LEADCHNL_RA_PACING_AMPLITUDE: 1.5 V
MDC_IDC_SET_LEADCHNL_RA_PACING_AMPLITUDE: 1.5 V
MDC_IDC_SET_LEADCHNL_RA_PACING_ANODE_ELECTRODE_1: NORMAL
MDC_IDC_SET_LEADCHNL_RA_PACING_ANODE_ELECTRODE_1: NORMAL
MDC_IDC_SET_LEADCHNL_RA_PACING_ANODE_LOCATION_1: NORMAL
MDC_IDC_SET_LEADCHNL_RA_PACING_ANODE_LOCATION_1: NORMAL
MDC_IDC_SET_LEADCHNL_RA_PACING_CAPTURE_MODE: NORMAL
MDC_IDC_SET_LEADCHNL_RA_PACING_CAPTURE_MODE: NORMAL
MDC_IDC_SET_LEADCHNL_RA_PACING_CATHODE_ELECTRODE_1: NORMAL
MDC_IDC_SET_LEADCHNL_RA_PACING_CATHODE_ELECTRODE_1: NORMAL
MDC_IDC_SET_LEADCHNL_RA_PACING_CATHODE_LOCATION_1: NORMAL
MDC_IDC_SET_LEADCHNL_RA_PACING_CATHODE_LOCATION_1: NORMAL
MDC_IDC_SET_LEADCHNL_RA_PACING_POLARITY: NORMAL
MDC_IDC_SET_LEADCHNL_RA_PACING_POLARITY: NORMAL
MDC_IDC_SET_LEADCHNL_RA_PACING_PULSEWIDTH: 0.4 MS
MDC_IDC_SET_LEADCHNL_RA_PACING_PULSEWIDTH: 0.4 MS
MDC_IDC_SET_LEADCHNL_RA_SENSING_ANODE_ELECTRODE_1: NORMAL
MDC_IDC_SET_LEADCHNL_RA_SENSING_ANODE_ELECTRODE_1: NORMAL
MDC_IDC_SET_LEADCHNL_RA_SENSING_ANODE_LOCATION_1: NORMAL
MDC_IDC_SET_LEADCHNL_RA_SENSING_ANODE_LOCATION_1: NORMAL
MDC_IDC_SET_LEADCHNL_RA_SENSING_CATHODE_ELECTRODE_1: NORMAL
MDC_IDC_SET_LEADCHNL_RA_SENSING_CATHODE_ELECTRODE_1: NORMAL
MDC_IDC_SET_LEADCHNL_RA_SENSING_CATHODE_LOCATION_1: NORMAL
MDC_IDC_SET_LEADCHNL_RA_SENSING_CATHODE_LOCATION_1: NORMAL
MDC_IDC_SET_LEADCHNL_RA_SENSING_POLARITY: NORMAL
MDC_IDC_SET_LEADCHNL_RA_SENSING_POLARITY: NORMAL
MDC_IDC_SET_LEADCHNL_RA_SENSING_SENSITIVITY: 0.3 MV
MDC_IDC_SET_LEADCHNL_RA_SENSING_SENSITIVITY: 0.3 MV
MDC_IDC_SET_LEADCHNL_RV_PACING_AMPLITUDE: 2 V
MDC_IDC_SET_LEADCHNL_RV_PACING_AMPLITUDE: 2 V
MDC_IDC_SET_LEADCHNL_RV_PACING_ANODE_ELECTRODE_1: NORMAL
MDC_IDC_SET_LEADCHNL_RV_PACING_ANODE_ELECTRODE_1: NORMAL
MDC_IDC_SET_LEADCHNL_RV_PACING_ANODE_LOCATION_1: NORMAL
MDC_IDC_SET_LEADCHNL_RV_PACING_ANODE_LOCATION_1: NORMAL
MDC_IDC_SET_LEADCHNL_RV_PACING_CAPTURE_MODE: NORMAL
MDC_IDC_SET_LEADCHNL_RV_PACING_CAPTURE_MODE: NORMAL
MDC_IDC_SET_LEADCHNL_RV_PACING_CATHODE_ELECTRODE_1: NORMAL
MDC_IDC_SET_LEADCHNL_RV_PACING_CATHODE_ELECTRODE_1: NORMAL
MDC_IDC_SET_LEADCHNL_RV_PACING_CATHODE_LOCATION_1: NORMAL
MDC_IDC_SET_LEADCHNL_RV_PACING_CATHODE_LOCATION_1: NORMAL
MDC_IDC_SET_LEADCHNL_RV_PACING_POLARITY: NORMAL
MDC_IDC_SET_LEADCHNL_RV_PACING_POLARITY: NORMAL
MDC_IDC_SET_LEADCHNL_RV_PACING_PULSEWIDTH: 0.4 MS
MDC_IDC_SET_LEADCHNL_RV_PACING_PULSEWIDTH: 0.4 MS
MDC_IDC_SET_LEADCHNL_RV_SENSING_ANODE_ELECTRODE_1: NORMAL
MDC_IDC_SET_LEADCHNL_RV_SENSING_ANODE_ELECTRODE_1: NORMAL
MDC_IDC_SET_LEADCHNL_RV_SENSING_ANODE_LOCATION_1: NORMAL
MDC_IDC_SET_LEADCHNL_RV_SENSING_ANODE_LOCATION_1: NORMAL
MDC_IDC_SET_LEADCHNL_RV_SENSING_CATHODE_ELECTRODE_1: NORMAL
MDC_IDC_SET_LEADCHNL_RV_SENSING_CATHODE_ELECTRODE_1: NORMAL
MDC_IDC_SET_LEADCHNL_RV_SENSING_CATHODE_LOCATION_1: NORMAL
MDC_IDC_SET_LEADCHNL_RV_SENSING_CATHODE_LOCATION_1: NORMAL
MDC_IDC_SET_LEADCHNL_RV_SENSING_POLARITY: NORMAL
MDC_IDC_SET_LEADCHNL_RV_SENSING_POLARITY: NORMAL
MDC_IDC_SET_LEADCHNL_RV_SENSING_SENSITIVITY: 0.9 MV
MDC_IDC_SET_LEADCHNL_RV_SENSING_SENSITIVITY: 0.9 MV
MDC_IDC_SET_ZONE_DETECTION_INTERVAL: 350 MS
MDC_IDC_SET_ZONE_DETECTION_INTERVAL: 350 MS
MDC_IDC_SET_ZONE_DETECTION_INTERVAL: 400 MS
MDC_IDC_SET_ZONE_DETECTION_INTERVAL: 400 MS
MDC_IDC_SET_ZONE_STATUS: NORMAL
MDC_IDC_SET_ZONE_TYPE: NORMAL
MDC_IDC_SET_ZONE_VENDOR_TYPE: NORMAL
MDC_IDC_STAT_AT_BURDEN_PERCENT: 0.1 %
MDC_IDC_STAT_AT_BURDEN_PERCENT: 0.1 %
MDC_IDC_STAT_AT_DTM_END: NORMAL
MDC_IDC_STAT_AT_DTM_END: NORMAL
MDC_IDC_STAT_AT_DTM_START: NORMAL
MDC_IDC_STAT_AT_DTM_START: NORMAL
MDC_IDC_STAT_BRADY_AP_VP_PERCENT: 3.02 %
MDC_IDC_STAT_BRADY_AP_VP_PERCENT: 3.02 %
MDC_IDC_STAT_BRADY_AP_VS_PERCENT: 4.72 %
MDC_IDC_STAT_BRADY_AP_VS_PERCENT: 4.72 %
MDC_IDC_STAT_BRADY_AS_VP_PERCENT: 11.87 %
MDC_IDC_STAT_BRADY_AS_VP_PERCENT: 11.87 %
MDC_IDC_STAT_BRADY_AS_VS_PERCENT: 80.38 %
MDC_IDC_STAT_BRADY_AS_VS_PERCENT: 80.38 %
MDC_IDC_STAT_BRADY_DTM_END: NORMAL
MDC_IDC_STAT_BRADY_DTM_END: NORMAL
MDC_IDC_STAT_BRADY_DTM_START: NORMAL
MDC_IDC_STAT_BRADY_DTM_START: NORMAL
MDC_IDC_STAT_BRADY_RA_PERCENT_PACED: 8.07 %
MDC_IDC_STAT_BRADY_RA_PERCENT_PACED: 8.07 %
MDC_IDC_STAT_BRADY_RV_PERCENT_PACED: 14.89 %
MDC_IDC_STAT_BRADY_RV_PERCENT_PACED: 14.89 %
MDC_IDC_STAT_CRT_DTM_END: NORMAL
MDC_IDC_STAT_CRT_DTM_END: NORMAL
MDC_IDC_STAT_CRT_DTM_START: NORMAL
MDC_IDC_STAT_CRT_DTM_START: NORMAL
MDC_IDC_STAT_CRT_LV_PERCENT_PACED: 14.84 %
MDC_IDC_STAT_CRT_LV_PERCENT_PACED: 14.84 %
MDC_IDC_STAT_CRT_PERCENT_PACED: 14.84 %
MDC_IDC_STAT_CRT_PERCENT_PACED: 14.84 %
MDC_IDC_STAT_EPISODE_RECENT_COUNT: 0
MDC_IDC_STAT_EPISODE_RECENT_COUNT: 12
MDC_IDC_STAT_EPISODE_RECENT_COUNT: 12
MDC_IDC_STAT_EPISODE_RECENT_COUNT_DTM_END: NORMAL
MDC_IDC_STAT_EPISODE_RECENT_COUNT_DTM_START: NORMAL
MDC_IDC_STAT_EPISODE_TOTAL_COUNT: 0
MDC_IDC_STAT_EPISODE_TOTAL_COUNT: 1
MDC_IDC_STAT_EPISODE_TOTAL_COUNT: 1
MDC_IDC_STAT_EPISODE_TOTAL_COUNT: 24
MDC_IDC_STAT_EPISODE_TOTAL_COUNT: 24
MDC_IDC_STAT_EPISODE_TOTAL_COUNT_DTM_END: NORMAL
MDC_IDC_STAT_EPISODE_TOTAL_COUNT_DTM_START: NORMAL
MDC_IDC_STAT_EPISODE_TYPE: NORMAL
MDC_IDC_STAT_TACHYTHERAPY_RECENT_DTM_END: NORMAL
MDC_IDC_STAT_TACHYTHERAPY_RECENT_DTM_END: NORMAL
MDC_IDC_STAT_TACHYTHERAPY_RECENT_DTM_START: NORMAL
MDC_IDC_STAT_TACHYTHERAPY_RECENT_DTM_START: NORMAL
MDC_IDC_STAT_TACHYTHERAPY_TOTAL_DTM_END: NORMAL
MDC_IDC_STAT_TACHYTHERAPY_TOTAL_DTM_END: NORMAL
MDC_IDC_STAT_TACHYTHERAPY_TOTAL_DTM_START: NORMAL
MDC_IDC_STAT_TACHYTHERAPY_TOTAL_DTM_START: NORMAL

## 2025-01-16 DIAGNOSIS — I42.8 NONISCHEMIC CARDIOMYOPATHY (H): ICD-10-CM

## 2025-01-16 DIAGNOSIS — G71.11 MYOTONIC DYSTROPHY, TYPE 2 (H): Primary | ICD-10-CM

## 2025-01-16 DIAGNOSIS — I50.89 OTHER HEART FAILURE (H): ICD-10-CM

## 2025-01-16 DIAGNOSIS — Z13.6 ENCOUNTER FOR SCREENING FOR CARDIOVASCULAR DISORDERS: ICD-10-CM

## 2025-01-27 ENCOUNTER — OFFICE VISIT (OUTPATIENT)
Dept: NEUROLOGY | Facility: CLINIC | Age: 70
End: 2025-01-27
Payer: COMMERCIAL

## 2025-01-27 ENCOUNTER — LAB (OUTPATIENT)
Dept: LAB | Facility: CLINIC | Age: 70
End: 2025-01-27
Attending: INTERNAL MEDICINE
Payer: COMMERCIAL

## 2025-01-27 ENCOUNTER — THERAPY VISIT (OUTPATIENT)
Dept: PHYSICAL THERAPY | Facility: CLINIC | Age: 70
End: 2025-01-27
Payer: COMMERCIAL

## 2025-01-27 ENCOUNTER — ANCILLARY PROCEDURE (OUTPATIENT)
Dept: CARDIOLOGY | Facility: CLINIC | Age: 70
End: 2025-01-27
Attending: INTERNAL MEDICINE
Payer: COMMERCIAL

## 2025-01-27 VITALS
OXYGEN SATURATION: 99 % | HEART RATE: 61 BPM | SYSTOLIC BLOOD PRESSURE: 123 MMHG | DIASTOLIC BLOOD PRESSURE: 74 MMHG | WEIGHT: 232 LBS | BODY MASS INDEX: 29.79 KG/M2

## 2025-01-27 VITALS
WEIGHT: 232.7 LBS | OXYGEN SATURATION: 98 % | HEIGHT: 74 IN | SYSTOLIC BLOOD PRESSURE: 136 MMHG | DIASTOLIC BLOOD PRESSURE: 84 MMHG | BODY MASS INDEX: 29.86 KG/M2 | HEART RATE: 61 BPM

## 2025-01-27 DIAGNOSIS — Z13.6 ENCOUNTER FOR SCREENING FOR CARDIOVASCULAR DISORDERS: ICD-10-CM

## 2025-01-27 DIAGNOSIS — G71.11 MYOTONIC DYSTROPHY, TYPE 2 (H): Primary | ICD-10-CM

## 2025-01-27 DIAGNOSIS — R94.6 ABNORMAL RESULTS OF THYROID FUNCTION STUDIES: ICD-10-CM

## 2025-01-27 DIAGNOSIS — G71.11 MYOTONIC DYSTROPHY, TYPE 2 (H): ICD-10-CM

## 2025-01-27 DIAGNOSIS — Z95.0 CARDIAC RESYNCHRONIZATION THERAPY PACEMAKER (CRT-P) IN PLACE: ICD-10-CM

## 2025-01-27 DIAGNOSIS — I50.89 OTHER HEART FAILURE (H): ICD-10-CM

## 2025-01-27 LAB
ALBUMIN SERPL BCG-MCNC: 4 G/DL (ref 3.5–5.2)
ALP SERPL-CCNC: 79 U/L (ref 40–150)
ALT SERPL W P-5'-P-CCNC: 30 U/L (ref 0–70)
ANION GAP SERPL CALCULATED.3IONS-SCNC: 8 MMOL/L (ref 7–15)
AST SERPL W P-5'-P-CCNC: 36 U/L (ref 0–45)
BILIRUB SERPL-MCNC: 0.6 MG/DL
BUN SERPL-MCNC: 19.9 MG/DL (ref 8–23)
CALCIUM SERPL-MCNC: 9.4 MG/DL (ref 8.8–10.4)
CHLORIDE SERPL-SCNC: 107 MMOL/L (ref 98–107)
CHOLEST SERPL-MCNC: 186 MG/DL
CK SERPL-CCNC: 483 U/L (ref 39–308)
CREAT SERPL-MCNC: 0.44 MG/DL (ref 0.67–1.17)
EGFRCR SERPLBLD CKD-EPI 2021: >90 ML/MIN/1.73M2
ERYTHROCYTE [DISTWIDTH] IN BLOOD BY AUTOMATED COUNT: 13.4 % (ref 10–15)
FASTING STATUS PATIENT QL REPORTED: NO
FASTING STATUS PATIENT QL REPORTED: NO
GLUCOSE SERPL-MCNC: 119 MG/DL (ref 70–99)
HCO3 SERPL-SCNC: 27 MMOL/L (ref 22–29)
HCT VFR BLD AUTO: 44.2 % (ref 40–53)
HDLC SERPL-MCNC: 52 MG/DL
HGB BLD-MCNC: 15.4 G/DL (ref 13.3–17.7)
LDLC SERPL CALC-MCNC: 90 MG/DL
MAGNESIUM SERPL-MCNC: 2 MG/DL (ref 1.7–2.3)
MCH RBC QN AUTO: 31.7 PG (ref 26.5–33)
MCHC RBC AUTO-ENTMCNC: 34.8 G/DL (ref 31.5–36.5)
MCV RBC AUTO: 91 FL (ref 78–100)
MDC_IDC_EPISODE_DTM: NORMAL
MDC_IDC_EPISODE_DURATION: 1360 S
MDC_IDC_EPISODE_DURATION: 162 S
MDC_IDC_EPISODE_DURATION: 19 S
MDC_IDC_EPISODE_DURATION: 33 S
MDC_IDC_EPISODE_DURATION: 661 S
MDC_IDC_EPISODE_DURATION: 81 S
MDC_IDC_EPISODE_DURATION: 985 S
MDC_IDC_EPISODE_ID: 1203
MDC_IDC_EPISODE_ID: 1204
MDC_IDC_EPISODE_ID: 1205
MDC_IDC_EPISODE_ID: 1206
MDC_IDC_EPISODE_ID: 1207
MDC_IDC_EPISODE_ID: NORMAL
MDC_IDC_EPISODE_TYPE: NORMAL
MDC_IDC_LEAD_CONNECTION_STATUS: NORMAL
MDC_IDC_LEAD_IMPLANT_DT: NORMAL
MDC_IDC_LEAD_LOCATION: NORMAL
MDC_IDC_LEAD_LOCATION_DETAIL_1: NORMAL
MDC_IDC_LEAD_MFG: NORMAL
MDC_IDC_LEAD_MODEL: NORMAL
MDC_IDC_LEAD_POLARITY_TYPE: NORMAL
MDC_IDC_LEAD_SERIAL: NORMAL
MDC_IDC_LEAD_SPECIAL_FUNCTION: NORMAL
MDC_IDC_MSMT_BATTERY_DTM: NORMAL
MDC_IDC_MSMT_BATTERY_REMAINING_LONGEVITY: 148 MO
MDC_IDC_MSMT_BATTERY_RRT_TRIGGER: 2.6
MDC_IDC_MSMT_BATTERY_STATUS: NORMAL
MDC_IDC_MSMT_BATTERY_VOLTAGE: 3.03 V
MDC_IDC_MSMT_LEADCHNL_LV_IMPEDANCE_VALUE: 1159 OHM
MDC_IDC_MSMT_LEADCHNL_LV_IMPEDANCE_VALUE: 1235 OHM
MDC_IDC_MSMT_LEADCHNL_LV_IMPEDANCE_VALUE: 1235 OHM
MDC_IDC_MSMT_LEADCHNL_LV_IMPEDANCE_VALUE: 399 OHM
MDC_IDC_MSMT_LEADCHNL_LV_IMPEDANCE_VALUE: 418 OHM
MDC_IDC_MSMT_LEADCHNL_LV_IMPEDANCE_VALUE: 589 OHM
MDC_IDC_MSMT_LEADCHNL_LV_IMPEDANCE_VALUE: 703 OHM
MDC_IDC_MSMT_LEADCHNL_LV_IMPEDANCE_VALUE: 874 OHM
MDC_IDC_MSMT_LEADCHNL_LV_IMPEDANCE_VALUE: 912 OHM
MDC_IDC_MSMT_LEADCHNL_LV_IMPEDANCE_VALUE: 931 OHM
MDC_IDC_MSMT_LEADCHNL_LV_PACING_THRESHOLD_AMPLITUDE: 1.25 V
MDC_IDC_MSMT_LEADCHNL_LV_PACING_THRESHOLD_PULSEWIDTH: 0.8 MS
MDC_IDC_MSMT_LEADCHNL_RA_IMPEDANCE_VALUE: 361 OHM
MDC_IDC_MSMT_LEADCHNL_RA_IMPEDANCE_VALUE: 475 OHM
MDC_IDC_MSMT_LEADCHNL_RA_PACING_THRESHOLD_AMPLITUDE: 0.5 V
MDC_IDC_MSMT_LEADCHNL_RA_PACING_THRESHOLD_PULSEWIDTH: 0.4 MS
MDC_IDC_MSMT_LEADCHNL_RA_SENSING_INTR_AMPL: 5.12 MV
MDC_IDC_MSMT_LEADCHNL_RV_IMPEDANCE_VALUE: 361 OHM
MDC_IDC_MSMT_LEADCHNL_RV_IMPEDANCE_VALUE: 437 OHM
MDC_IDC_MSMT_LEADCHNL_RV_PACING_THRESHOLD_AMPLITUDE: 0.75 V
MDC_IDC_MSMT_LEADCHNL_RV_PACING_THRESHOLD_PULSEWIDTH: 0.4 MS
MDC_IDC_MSMT_LEADCHNL_RV_SENSING_INTR_AMPL: 18 MV
MDC_IDC_PG_IMPLANT_DTM: NORMAL
MDC_IDC_PG_MFG: NORMAL
MDC_IDC_PG_MODEL: NORMAL
MDC_IDC_PG_SERIAL: NORMAL
MDC_IDC_PG_TYPE: NORMAL
MDC_IDC_SESS_CLINIC_NAME: NORMAL
MDC_IDC_SESS_DTM: NORMAL
MDC_IDC_SESS_TYPE: NORMAL
MDC_IDC_SET_BRADY_AT_MODE_SWITCH_RATE: 171 {BEATS}/MIN
MDC_IDC_SET_BRADY_LOWRATE: 50 {BEATS}/MIN
MDC_IDC_SET_BRADY_MAX_SENSOR_RATE: 130 {BEATS}/MIN
MDC_IDC_SET_BRADY_MAX_TRACKING_RATE: 130 {BEATS}/MIN
MDC_IDC_SET_BRADY_MODE: NORMAL
MDC_IDC_SET_BRADY_PAV_DELAY_HIGH: 100 MS
MDC_IDC_SET_BRADY_PAV_DELAY_LOW: 350 MS
MDC_IDC_SET_BRADY_SAV_DELAY_HIGH: 70 MS
MDC_IDC_SET_BRADY_SAV_DELAY_LOW: 300 MS
MDC_IDC_SET_CRT_LVRV_DELAY: 0 MS
MDC_IDC_SET_CRT_PACED_CHAMBERS: NORMAL
MDC_IDC_SET_LEADCHNL_LV_PACING_AMPLITUDE: 1.75 V
MDC_IDC_SET_LEADCHNL_LV_PACING_ANODE_ELECTRODE_1: NORMAL
MDC_IDC_SET_LEADCHNL_LV_PACING_ANODE_LOCATION_1: NORMAL
MDC_IDC_SET_LEADCHNL_LV_PACING_CAPTURE_MODE: NORMAL
MDC_IDC_SET_LEADCHNL_LV_PACING_CATHODE_ELECTRODE_1: NORMAL
MDC_IDC_SET_LEADCHNL_LV_PACING_CATHODE_LOCATION_1: NORMAL
MDC_IDC_SET_LEADCHNL_LV_PACING_POLARITY: NORMAL
MDC_IDC_SET_LEADCHNL_LV_PACING_PULSEWIDTH: 0.8 MS
MDC_IDC_SET_LEADCHNL_RA_PACING_AMPLITUDE: 1.5 V
MDC_IDC_SET_LEADCHNL_RA_PACING_ANODE_ELECTRODE_1: NORMAL
MDC_IDC_SET_LEADCHNL_RA_PACING_ANODE_LOCATION_1: NORMAL
MDC_IDC_SET_LEADCHNL_RA_PACING_CAPTURE_MODE: NORMAL
MDC_IDC_SET_LEADCHNL_RA_PACING_CATHODE_ELECTRODE_1: NORMAL
MDC_IDC_SET_LEADCHNL_RA_PACING_CATHODE_LOCATION_1: NORMAL
MDC_IDC_SET_LEADCHNL_RA_PACING_POLARITY: NORMAL
MDC_IDC_SET_LEADCHNL_RA_PACING_PULSEWIDTH: 0.4 MS
MDC_IDC_SET_LEADCHNL_RA_SENSING_ANODE_ELECTRODE_1: NORMAL
MDC_IDC_SET_LEADCHNL_RA_SENSING_ANODE_LOCATION_1: NORMAL
MDC_IDC_SET_LEADCHNL_RA_SENSING_CATHODE_ELECTRODE_1: NORMAL
MDC_IDC_SET_LEADCHNL_RA_SENSING_CATHODE_LOCATION_1: NORMAL
MDC_IDC_SET_LEADCHNL_RA_SENSING_POLARITY: NORMAL
MDC_IDC_SET_LEADCHNL_RA_SENSING_SENSITIVITY: 0.3 MV
MDC_IDC_SET_LEADCHNL_RV_PACING_AMPLITUDE: 2 V
MDC_IDC_SET_LEADCHNL_RV_PACING_ANODE_ELECTRODE_1: NORMAL
MDC_IDC_SET_LEADCHNL_RV_PACING_ANODE_LOCATION_1: NORMAL
MDC_IDC_SET_LEADCHNL_RV_PACING_CAPTURE_MODE: NORMAL
MDC_IDC_SET_LEADCHNL_RV_PACING_CATHODE_ELECTRODE_1: NORMAL
MDC_IDC_SET_LEADCHNL_RV_PACING_CATHODE_LOCATION_1: NORMAL
MDC_IDC_SET_LEADCHNL_RV_PACING_POLARITY: NORMAL
MDC_IDC_SET_LEADCHNL_RV_PACING_PULSEWIDTH: 0.4 MS
MDC_IDC_SET_LEADCHNL_RV_SENSING_ANODE_ELECTRODE_1: NORMAL
MDC_IDC_SET_LEADCHNL_RV_SENSING_ANODE_LOCATION_1: NORMAL
MDC_IDC_SET_LEADCHNL_RV_SENSING_CATHODE_ELECTRODE_1: NORMAL
MDC_IDC_SET_LEADCHNL_RV_SENSING_CATHODE_LOCATION_1: NORMAL
MDC_IDC_SET_LEADCHNL_RV_SENSING_POLARITY: NORMAL
MDC_IDC_SET_LEADCHNL_RV_SENSING_SENSITIVITY: 0.9 MV
MDC_IDC_SET_ZONE_DETECTION_INTERVAL: 350 MS
MDC_IDC_SET_ZONE_DETECTION_INTERVAL: 400 MS
MDC_IDC_SET_ZONE_STATUS: NORMAL
MDC_IDC_SET_ZONE_STATUS: NORMAL
MDC_IDC_SET_ZONE_TYPE: NORMAL
MDC_IDC_SET_ZONE_VENDOR_TYPE: NORMAL
MDC_IDC_STAT_AT_BURDEN_PERCENT: 0 %
MDC_IDC_STAT_AT_DTM_END: NORMAL
MDC_IDC_STAT_AT_DTM_START: NORMAL
MDC_IDC_STAT_BRADY_AP_VP_PERCENT: 2.27 %
MDC_IDC_STAT_BRADY_AP_VS_PERCENT: 4.25 %
MDC_IDC_STAT_BRADY_AS_VP_PERCENT: 9.01 %
MDC_IDC_STAT_BRADY_AS_VS_PERCENT: 84.47 %
MDC_IDC_STAT_BRADY_DTM_END: NORMAL
MDC_IDC_STAT_BRADY_DTM_START: NORMAL
MDC_IDC_STAT_BRADY_RA_PERCENT_PACED: 6.88 %
MDC_IDC_STAT_BRADY_RV_PERCENT_PACED: 11.28 %
MDC_IDC_STAT_CRT_DTM_END: NORMAL
MDC_IDC_STAT_CRT_DTM_START: NORMAL
MDC_IDC_STAT_CRT_LV_PERCENT_PACED: 11.23 %
MDC_IDC_STAT_CRT_PERCENT_PACED: 11.23 %
MDC_IDC_STAT_EPISODE_RECENT_COUNT: 0
MDC_IDC_STAT_EPISODE_RECENT_COUNT_DTM_END: NORMAL
MDC_IDC_STAT_EPISODE_RECENT_COUNT_DTM_START: NORMAL
MDC_IDC_STAT_EPISODE_TOTAL_COUNT: 0
MDC_IDC_STAT_EPISODE_TOTAL_COUNT: 1
MDC_IDC_STAT_EPISODE_TOTAL_COUNT: 24
MDC_IDC_STAT_EPISODE_TOTAL_COUNT_DTM_END: NORMAL
MDC_IDC_STAT_EPISODE_TOTAL_COUNT_DTM_START: NORMAL
MDC_IDC_STAT_EPISODE_TYPE: NORMAL
MDC_IDC_STAT_TACHYTHERAPY_RECENT_DTM_END: NORMAL
MDC_IDC_STAT_TACHYTHERAPY_RECENT_DTM_START: NORMAL
MDC_IDC_STAT_TACHYTHERAPY_TOTAL_DTM_END: NORMAL
MDC_IDC_STAT_TACHYTHERAPY_TOTAL_DTM_START: NORMAL
NONHDLC SERPL-MCNC: 134 MG/DL
NT-PROBNP SERPL-MCNC: 444 PG/ML (ref 0–900)
PLATELET # BLD AUTO: 188 10E3/UL (ref 150–450)
POTASSIUM SERPL-SCNC: 4.8 MMOL/L (ref 3.4–5.3)
PROT SERPL-MCNC: 6.6 G/DL (ref 6.4–8.3)
RBC # BLD AUTO: 4.86 10E6/UL (ref 4.4–5.9)
SODIUM SERPL-SCNC: 142 MMOL/L (ref 135–145)
TRIGL SERPL-MCNC: 219 MG/DL
TROPONIN T SERPL HS-MCNC: 66 NG/L
TSH SERPL DL<=0.005 MIU/L-ACNC: 2.68 UIU/ML (ref 0.3–4.2)
WBC # BLD AUTO: 7.6 10E3/UL (ref 4–11)

## 2025-01-27 PROCEDURE — 82550 ASSAY OF CK (CPK): CPT | Performed by: PATHOLOGY

## 2025-01-27 PROCEDURE — 80053 COMPREHEN METABOLIC PANEL: CPT | Performed by: PATHOLOGY

## 2025-01-27 PROCEDURE — 83880 ASSAY OF NATRIURETIC PEPTIDE: CPT | Performed by: PATHOLOGY

## 2025-01-27 PROCEDURE — 85027 COMPLETE CBC AUTOMATED: CPT | Performed by: PATHOLOGY

## 2025-01-27 PROCEDURE — G2211 COMPLEX E/M VISIT ADD ON: HCPCS | Performed by: PSYCHIATRY & NEUROLOGY

## 2025-01-27 PROCEDURE — 84443 ASSAY THYROID STIM HORMONE: CPT | Performed by: PATHOLOGY

## 2025-01-27 PROCEDURE — 36415 COLL VENOUS BLD VENIPUNCTURE: CPT | Performed by: PATHOLOGY

## 2025-01-27 PROCEDURE — 99215 OFFICE O/P EST HI 40 MIN: CPT | Mod: 25 | Performed by: INTERNAL MEDICINE

## 2025-01-27 PROCEDURE — 93281 PM DEVICE PROGR EVAL MULTI: CPT | Performed by: INTERNAL MEDICINE

## 2025-01-27 PROCEDURE — 84484 ASSAY OF TROPONIN QUANT: CPT | Performed by: PATHOLOGY

## 2025-01-27 PROCEDURE — 83735 ASSAY OF MAGNESIUM: CPT | Performed by: PATHOLOGY

## 2025-01-27 PROCEDURE — 99213 OFFICE O/P EST LOW 20 MIN: CPT | Mod: GC | Performed by: PSYCHIATRY & NEUROLOGY

## 2025-01-27 PROCEDURE — 80061 LIPID PANEL: CPT | Performed by: PATHOLOGY

## 2025-01-27 PROCEDURE — G0463 HOSPITAL OUTPT CLINIC VISIT: HCPCS | Performed by: INTERNAL MEDICINE

## 2025-01-27 RX ORDER — SPIRONOLACTONE 25 MG/1
12.5 TABLET ORAL DAILY
Qty: 45 TABLET | Refills: 3 | Status: SHIPPED | OUTPATIENT
Start: 2025-01-27

## 2025-01-27 ASSESSMENT — PAIN SCALES - GENERAL
PAINLEVEL_OUTOF10: MILD PAIN (1)
PAINLEVEL_OUTOF10: NO PAIN (0)

## 2025-01-27 NOTE — PATIENT INSTRUCTIONS
It was a pleasure to see you in clinic today, please do not hesitate to call us for questions or concerns.  Your next automatic remote Pacemaker check from home is scheduled for 4/30/2025.    Donna Ambrose RN    Electrophysiology Nurse Clinician  [facility]    During Business Hours Please Call:  [phone]  After Hours Please Call:  [phone] - select option #4 and ask for job code [phone]

## 2025-01-27 NOTE — LETTER
1/27/2025       RE: Rajni Lara  [address]  [place] [address]     Dear Colleague,    Thank you for referring your patient, Rajni Lara, to the [facility]UROLOG[facility]INIC [place] [facility]. Please see a copy of my visit note below.      Chief Complaint:  Myotonic dystrophy follow up    History of Present Illness:    Rajni Lara is a 68 year old man who presents to the [facility] Neuromuscular department [facility] clinic for follow up of genetically confirmed type 2 myotonic dystrophy.  Last seen in 1/2024 with trace worsening of his proximal leg weakness. No changes were made at this visit as he was relatively stable in exam and functional ability.     Today, he has no complaints. He had 2 falls this past year, he did not pick his foot up enough and tripped over the carpet. He does not use any assistive devices. He feels like he walks slower than before and has a harder time picking up his feet. He has difficulty walking up stairs, needing to crawl up them do to left leg weakness. No changes in the ability to use his hands, able to cut his food well. Does endorse difficulty rolling in bed. It is harder for him to stand up from a chair, often needs help doing so. He is able to do all his ADLs independently. If he eats to fast and then drinks something, he gets a pressure on his chest. No choking episodes, he is careful to chew his food well, generally. He sleeps well, wakes up once per night to urinate for his BPH. He snores, no waking up gasping for air. He sleeps with two pillows. Occasionally takes naps during the day,  Does feel short of breath when carrying heavy groceries or walking for a long time.       Medical Allergies:       Allergies   Allergen Reactions     Succinylcholine Other (See Comments)     Sulfa Antibiotics Dermatitis, Headache and Photosensitivity       Current Medications:      Current Outpatient Medications  "  Medication Sig Dispense Refill     acetaminophen (TYLENOL) 500 MG tablet Take 1,000 mg by mouth       acetaminophen-codeine (TYLENOL #3) 300-30 MG tablet Take 1-2 tablets by mouth every 4 hours as needed for moderate to severe pain 10 tablet 0     bisacodyl (DULCOLAX) 5 MG EC tablet Take 5 mg by mouth (Patient not taking: Reported on 1/22/2024)       finasteride (PROSCAR) 5 MG tablet Take 1 tablet by mouth every morning       losartan (COZAAR) 25 MG tablet Take 0.5 tablets (12.5 mg) by mouth daily 45 tablet 3     naproxen sodium 220 MG capsule Take 440 mg by mouth as needed (as needed)       nystatin-triamcinolone (MYCOLOG) 796063-4.1 UNIT/GM-% external ointment Apply topically 2 times daily       PEG 3350-KCl-NaBcb-NaCl-NaSulf 240 g SOLR 4,000 mLs (Patient not taking: Reported on 1/22/2024)       sildenafil (REVATIO) 20 MG tablet Take 20 mg by mouth daily as needed       tamsulosin (FLOMAX) 0.4 MG capsule TAKE ONE CAPSULE BY MOUTH AT BEDTIME       No current facility-administered medications for this visit.       Review of Systems:   A complete review of systems was obtained and was negative except for what was noted above.    Physical examination:    /84 (BP Location: Left arm, Patient Position: Sitting, Cuff Size: Adult Regular)   Pulse 61   Ht 1.88 m (6' 2\")   Wt 105.6 kg (232 lb 11.2 oz)   SpO2 98%   BMI 29.88 kg/m       General Appearance: NAD    Skin: There are no rashes or other skin lesions.    Musculoskeletal:  There is no scoliosis, lordosis, kyphosis, pes cavus, or hammertoes.    Neurologic examination:    Mental status:  Patient is alert, attentive, and oriented x 3.  Language is coherent and fluent without dysarthria or aphasia.  Memory, comprehension and ability to follow commands were intact.       Cranial nerves: Extraocular movements were full. There was no face, jaw, palate or tongue weakness or atrophy. Hearing was grossly intact.      Motor exam: no myotonia present    Right Left " "  Neck flexion 5    Neck extension: 5    Shoulder abduction:  4+ 4+   Elbow Flexion: 4+ 4+   Elbow Extension:  4- 4+   Wrist Extension:  5 5   Finger Extension:  5 5   FDI 5 5   APB 5 5   Finger Flexion 5 5   Wrist Flexion 5 5   Hip Flexion 4- 4+   Hip Extension 4 4   Knee Extension 5 5   Knee Flexion 5 5   Dorsiflexion 5 5   Plantar flexion 5 5   Foot Inversion 5 5   Foot Eversion 5 5     Uses hands to get up from chair    Complex motor skills revealed normal coordination.  Finger-nose- finger and heel to shin were intact.  Mild right upper extremity tremor    Sensory exam revealed intact to light touch.     Gait mildly wide based     Deep tendon reflexes:   Right Left   Triceps 2 2   Biceps 2 2   Brachioradialis 2 2   Knee jerk 2 2   Ankle jerk 2 2     Most Recent [place] Pulmonary Function Testing    FVC-Pred   Date Value Ref Range Status   08/21/2024 4.67 L      FVC-Pre   Date Value Ref Range Status   08/21/2024 3.29 L      FVC-%Pred-Pre   Date Value Ref Range Status   08/21/2024 70 %      FEV1-Pre   Date Value Ref Range Status   08/21/2024 2.64 L      FEV1-%Pred-Pre   Date Value Ref Range Status   08/21/2024 74 %      FEV1FVC-Pred   Date Value Ref Range Status   08/21/2024 76 %      FEV1FVC-Pre   Date Value Ref Range Status   08/21/2024 80 %      No results found for: \"20029\"  FEFMax-Pred   Date Value Ref Range Status   08/21/2024 9.64 L/sec      FEFMax-Pre   Date Value Ref Range Status   08/21/2024 8.66 L/sec      FEFMax-%Pred-Pre   Date Value Ref Range Status   08/21/2024 89 %      ExpTime-Pre   Date Value Ref Range Status   08/21/2024 5.67 sec      FIFMax-Pre   Date Value Ref Range Status   08/21/2024 5.00 L/sec      FEV1FEV6-Pred   Date Value Ref Range Status   08/21/2024 78 %      FEV1FEV6-Pre   Date Value Ref Range Status   08/21/2024 81 %      No results found for: \"20055\"      Assessment/Plan:    Rajni Lara has genetically confirmed type 2 myotonic dystrophy. He continues to have progressive weakness " in his proximal lower extremities, making it harder to climb stairs, walk, and stand up from a chair. 2 falls this past year. No dysphagia symptoms. Does endorse shortness of breath with exertion. He has mild worsening of his PFTs today, but given this and his known cardiomyopathy, we will do a trial of bipap. Also plan to have him see PT this afternoon quickly. If this cannot be done, plan to send orders to [place] for PT. He will also cardiology.     Seen with Jr Batista, DO   Neurophysiology Fellow        Attestation signed by Reinaldo Rodriguez MD at 1/27/2025  1:19 PM:  Patient seen and examined with neurophysiology fellow Dr. Lora at the [facility]/neuromuscular clinic today.  I agree with his assessment and plan.  Mr. Lara clearly has some progression of his proximal leg and to a lesser extent shoulder girdle weakness due to his type II myotonic dystrophy.  He has a harder time getting up from chairs.  I think he will need to start using some assistive device soon.  He should be evaluated by physical therapist today.  Otherwise, plan per note of Dr. Lora.      Following face-to-face visit today, I consider noninvasive ventilation (NIV) at night medically necessary for this patient.  His most recent pulmonary function test showed MIP of -53 cmH2O indicating neuromuscular respiratory weakness.  In this context, early initiation of NIV can improve the quality of sleep, daytime energy of the patient, and importantly, it can also reduce the cardiac afterload and improve his cardiomyopathy symptoms.  I attest to the necessity of this equipment.    Follow-up in 1 year, sooner if needed.    Reinaldo Rodriguez MD, FAAN    TT spent on this encounter today 25 minutes-10 face-to-face, 10 in postvisit note review, editing, and orders, and 5 in previsit chart review.    The longitudinal plan of care for the diagnosis(es)/condition(s) as documented were addressed  during this visit. Due to the added complexity in care, I will continue to support OJ in the subsequent management and with ongoing continuity of care.      Again, thank you for allowing me to participate in the care of your patient.      Sincerely,    Reinaldo Rodriguez MD

## 2025-01-27 NOTE — PATIENT INSTRUCTIONS
"You were seen today in the Cardiovascular Clinic at the [facility].      Cardiology Providers you saw during your visit:  Dr. Mark Ashton     Recommendations:   Start taking spironolactone 12.5 mg once daily.  Labs (basic metabolic panel) in 1-2 weeks.  Follow up with Renetta Hawley in 6 months, labs and device check prior.  Follow up with Dr. Ashton in 1 year with labs and device check prior. You should have your cardiac MRI before this visit as well. I can fax orders to [employer] if needed. This appointment can be scheduled on the same day as your appointment with Dr. Rodriguez.       Thank you for your visit today!   Please MyChart message or call if you have any questions or concerns.      During Business Hours:  [phone], option # 1 \"To leave a message for your care team\"     After hours, weekends or holidays:   [phone], Option #4  Ask to speak to the On-Call Cardiologist. Inform them you are a heart failure patient at the [place].      Joan Tolbert RN BSN   Cardiology Nurse Coordinator - Heart Failure/C.O.R.E. Clinic  [facility]  [phone] option 1 to schedule an appointment or leave a message for your care team      "

## 2025-01-27 NOTE — LETTER
2025      RE: Rajni Lara  [address]  [place] [address]       Dear Colleague,    Thank you for the opportunity to participate in the care of your patient, Rajni Lara, at the [facility] [place] [facility]. Please see a copy of my visit note below.    Neurocardiomyopathy Clinic Progress Note    Name: Rajni Lara  : 1955  MRN: [id number]    2025    Dear Dr. Rodriguez, Dr. Hadley, and Dr. Santiago,     I had the pleasure of seeing Mr. Rajni Lara, a 69 year old man today in the [facility] for a follow up visit for of myotonic dystrophy type 2 associated cardiomyopathy. He is accompanied by his wife.     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 He also has a family history of myotonic dystrophy in his sister and also probably his mother and maternal grandfather. He saw Dr. Cano in [facility] on 1/10/22. He had an echocardiogram in  and was incidentally idenitied to have atrial flutter which showed an LVEF of 40-45%. In  he had a successful cardioversion for atrial flutter. He had a echo in 2009 which showed an LVEF of 50-55% and normal RV function. He had a nuclear stress test in  which showed thinning at the inferior base and no ischemia. He has not had cardiovascular follow up since then.     I last saw him on  2024. 2023. He saw Dr. Hadley on 2024 for a follow up for CRT-P.  His CMR on 2025 demonstrates an LVEF of 46%, RVEF of 65%, LGE and a thin basal to mid anteroseptal stripe and inferolateral septal epicardial LGE and a nonischemic pattern is approximately 3% of the LV.  There is slight decrement of the LVEF compared to prior MRIs from 52 to 46%.  He recently had a trip when he was at a hotel due to falling on carpet and he had no  significant injuries.  He is getting back to biking for 5 to 10 minutes at a time now.  He denies any chest pain at rest or with exertion.  He does note that walking faster at the airport or carrying bags and walking up stairs does result in some dyspnea though he is largely limited by muscle strength.  He feels he may have gained a few pounds, but denies edema, orthopnea, or PND.  Denies palpitations, presyncope, or syncope.  He does have some occasional lightheadedness.  He notes that his appetite is good and his energy levels are unchanged.    His sister who had myotonic dystrophy type II and was 1 to 2 years younger than him recently .  He notes that she had an autoimmune disease as well as well as plexopathy and had significant leg pain.  She was not known to have any specific cardiovascular dysfunction.    He also saw Dr. Cano today who recommended NIV due to the reduced MIP.     REVIEW OF SYSTEMS: 10 point ROS neg other than the symptoms noted above in the HPI.    PAST MEDICAL HISTORY:   1. Myotonic dystrophy type 2 s/p CRT-P   2. History of atrial flutter s/p DCCV   3. History of non ischemic cardiomyopathy   4. BPH      ALLERGIES:    Allergies   Allergen Reactions     Succinylcholine Other (See Comments)     Sulfa Antibiotics Dermatitis, Headache and Photosensitivity       MEDICATIONS:   Current Outpatient Medications   Medication Sig Dispense Refill     acetaminophen (TYLENOL) 500 MG tablet Take 1,000 mg by mouth       acetaminophen-codeine (TYLENOL #3) 300-30 MG tablet Take 1-2 tablets by mouth every 4 hours as needed for moderate to severe pain 10 tablet 0     bisacodyl (DULCOLAX) 5 MG EC tablet Take 5 mg by mouth (Patient not taking: Reported on 2024)       finasteride (PROSCAR) 5 MG tablet Take 1 tablet by mouth every morning       losartan (COZAAR) 25 MG tablet Take 0.5 tablets (12.5 mg) by mouth daily 45 tablet 3     naproxen sodium 220 MG capsule Take 440 mg by mouth as needed  (as needed)       nystatin-triamcinolone (MYCOLOG) 080015-1.1 UNIT/GM-% external ointment Apply topically 2 times daily       PEG 3350-KCl-NaBcb-NaCl-NaSulf 240 g SOLR 4,000 mLs (Patient not taking: Reported on 2024)       sildenafil (REVATIO) 20 MG tablet Take 20 mg by mouth daily as needed       tamsulosin (FLOMAX) 0.4 MG capsule TAKE ONE CAPSULE BY MOUTH AT BEDTIME       No current facility-administered medications for this visit.     No ASA - delete from medication record     SOCIAL HISTORY: He is retired and was a pharmacist. He lives with his wife in [place], [place].   Tobacco: never  Alcohol: rarely   Illicits: now  His family is from [place]     FAMILY HISTORY:  His family is from [place]. He has multiple maternal family members with myotonic dystrophy.   Mom when 70 when falling and missing steps and in her 80s she was bedbound.   Sister is a physician and has MD2  Maternal grand father had a heart attack 59, and  and also had some leg weakness  Maternal uncle sudden death at age 64  Son and younger daughter tested negative. Older daughter is positive for MD2      PHYSICAL EXAM:   /74 (BP Location: Right arm, Patient Position: Chair, Cuff Size: Adult Regular)   Pulse 61   Wt 105.2 kg (232 lb)   SpO2 99%   BMI 29.79 kg/m    General: NAD,   HEENT: anicteric, normocephalic, atraumatic  Neck: JVD <7cm at 90 degrees  CV: RRR, s1 and s2, no murmurs, rubs, or gallops, CRT-P left upper chest  Lungs: CTAB, no crackles or wheezes, normal work of breathing  Abdomen: soft, non tender, non distended  Extremities: warm and well perfused      DIAGNOSTIC TESTING:    Latest Reference Range & Units 25 13:13   Sodium 135 - 145 mmol/L 142   Potassium 3.4 - 5.3 mmol/L 4.8   Chloride 98 - 107 mmol/L 107   Carbon Dioxide (CO2) 22 - 29 mmol/L 27   Urea Nitrogen 8.0 - 23.0 mg/dL 19.9   Creatinine 0.67 - 1.17 mg/dL 0.44 (L)   GFR Estimate >60 mL/min/1.73m2 >90   Calcium 8.8 - 10.4 mg/dL 9.4   Anion Gap 7 - 15  mmol/L 8   Magnesium 1.7 - 2.3 mg/dL 2.0   Albumin 3.5 - 5.2 g/dL 4.0   Protein Total 6.4 - 8.3 g/dL 6.6   Alkaline Phosphatase 40 - 150 U/L 79   ALT 0 - 70 U/L 30   AST 0 - 45 U/L 36   Bilirubin Total <=1.2 mg/dL 0.6   Cholesterol <200 mg/dL 186   CK Total 39 - 308 U/L 483 (H)   Patient Fasting?  No  No   Glucose 70 - 99 mg/dL 119 (H)   HDL Cholesterol >=40 mg/dL 52   LDL Cholesterol Calculated <100 mg/dL 90   Non HDL Cholesterol <130 mg/dL 134 (H)   N-Terminal Pro Bnp 0 - 900 pg/mL 444   Triglycerides <150 mg/dL 219 (H)   Troponin T, High Sensitivity <=22 ng/L 66 (H)   TSH 0.30 - 4.20 uIU/mL 2.68   WBC 4.0 - 11.0 10e3/uL 7.6   Hemoglobin 13.3 - 17.7 g/dL 15.4   Hematocrit 40.0 - 53.0 % 44.2   Platelet Count 150 - 450 10e3/uL 188   RBC Count 4.40 - 5.90 10e6/uL 4.86   MCV 78 - 100 fL 91   MCH 26.5 - 33.0 pg 31.7   MCHC 31.5 - 36.5 g/dL 34.8   RDW 10.0 - 15.0 % 13.4   (L): Data is abnormally low  (H): Data is abnormally high      CMR 1/13/2024:   Clinical history: 69M with myotonic dystrophy type 2, >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      Comparison CMR: 02/20/2023     1. The LV is mildly dilated in cavity size, with normal wall thickness. The global systolic function is  mildly reduced. The LVEF is 46%. There is diffuse global hypokinesis.     2. The RV is normal in cavity size. The global systolic function is normal. The RVEF is 65%. An ICD lead is  visualized in the apical RV.      3. Both atria are mildly enlarged in size. A pacemaker lead is visualized in the right atrium.      4. There is no significant valvular disease.      5. Late gadolinium enhancement imaging demonstrates thin stripe of basal to mid anteroseptal and  inferoseptal epicardial LGE similar to previous in a nonischemic pattern.      6. There is no pericardial effusion or thickening.     7. There is no  intracardiac thrombus.     CONCLUSIONS: Myotonic dystrophy with associated cardiomyopathy. The LV is mildly dilated with mildly  reduced function, LVEF 46%. Normal RV size and function, RVEF 65%. Stable thin stripe of basal to mid  anteroseptal and inferoseptal epicardial LGE in a nonischemic pattern. Compared to the study dated  2/20/2023, there is a modest decrease LV function.      CRT-P interrogation: 1/27/25:   Device: Medtronic [id number] Percepta Quad CRT-P  Normal device function  Mode: DDD  bpm  AP: 6.9%  : 11.3%  BVP: 11.2%  Intrinsic Rhythm: VS 83 bpm  Thoracic Impedance:  Near reference line.   Short V-V intervals: 0  Lead Trends Appear Stable.  Estimated battery longevity to RRT = 12.3 years.  Battery voltage = 3.03 V.   Atrial Arrhythmia: None  AF [place] = 0%  Anticoagulant: None  Ventricular Arrhythmia: None  Setting Changes: None  Patient has an appointment to see Dr. Ashton today.       Echo 8/30/2023: Left ventricular function is decreased. The ejection fraction is 45-50%  (mildly reduced). Grade I or early diastolic dysfunction. Mild diffuse  hypokinesis is present.  Global right ventricular function is normal. The right ventricle is normal  size.  No significant valvular abnormalities.  The estimated PA systolic pressure is 35 mmHg.  IVC diameter >2.1 cm collapsing <50% with sniff suggests a high RA pressure  estimated at 15 mmHg or greater.  This study was compared with the study from 05/02/2022. The RA pressure is  higher. No significant changes in the biventricular function.  ___________________________________________________      CMR 2/20/2023: Clinical history: 67 year old with medical history of myotonic dystrophy type 2, >75 CCTG repeats of the  CNBP gene and strong family history of myotonic dystrophy, cardiomyopathy LV EF 46%, bifascicular block  with interval prolongation of TN and QRS here for CMR prior to cardiac resynchronization therapy.       Comparison CMR: 7/18/2022     1.  The LV is normal in cavity size and wall thickness. The global systolic function is low normal. The LVEF  is 52%. There are no regional wall motion abnormalities.     2. The RV is normal in cavity size. The global systolic function is low normal. The RVEF is 51%.      3. Mild right atrial enlargement. Left atria is normal in size.     4. There is no significant valvular disease.      5. Late gadolinium enhancement imaging shows small area of mid myocardial enhancement of the basal to mid  septum.      6. There is no pericardial effusion or thickening.     CONCLUSIONS: Low normal biventricular function. LV EF 52%. RVEF 51% minimal improvement from prior study.  No significant valvular disease. Mild non-ischemic fibrosis in the septum unchanged from prior study.   the septum.     ziopatch monitor demonstrated sinus with 2.3% PVCs, no VT, and brief AV Wenckebach      ECG 7/18/22: personally reviewed: sinus with PVC, bifascular block, QRS duration 146ms.     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 He has a history of atrial flutter and a mytonic dystrophy type 2 associated cardiomyopathy and progressive bifasicular block now status post CRT-P.      1. 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 Mild non ischemic, biventricular systolic heart failure  3. History of presyncope  3. History of atrial flutter  4. Bifascicular block s/p CRT-P 5/2023  5. Wenckebach   6. Hypertension history   7.  Reduced maximal inspiratory pressure on PFTs    Overall, Mr. Rajni Lara continues to do well.  He has had some progression of his muscle dysfunction but continues to be able to do exercise.  He is largely limited by muscle weakness rather than dyspnea.  He does have myotonic dystrophy associated cardiomyopathy.  His EF is largely unchanged and is  mildly reduced.  While difficult to assess given his muscle symptoms he is largely class II.  He is euvolemic and well compensated.  He has normal endorgan function.  His N-terminal proBNP is normal however he continues to have an elevated high-sensitivity troponin T which can be seen in patients with muscular dystrophies.  He is tolerating low-dose losartan without symptoms.  Given his fibrosis and reduced EF we discussed starting spironolactone which he is agreeable to start.  I am starting this preferentially over a beta-blocker given his bifascicular block.  Will repeat a BMP in 1 to 2 weeks locally.    On device interrogation, he has V pacing 11.3% and has corresponding BiV pacing of 11.2%, which is effectively 99% BiV pacing of the time he is pacing, which is appropriate for the indication.     Additionally given his reduced maximal inspiratory pressure, in agreement with Dr. Cano regarding early noninvasive ventilation as this also has cardiac benefits.     PLAN:   -spironolactone 12.5mg daily and BMP in 1-2 weeks   -follow up with Ms. Hawley in 6 months  -CMR with contrast with annual visit   -annual visit which can be coordinated with the visit with Dr. Cano       40 minutes on DOS for chart review, visit,  counseling, review of diagnostic testing, coordination of care and documentation.     The longitudinal plan of care for the diagnosis(es)/condition(s) as documented were addressed during this visit. Due to the added complexity in care, I will continue to support OJ in the subsequent management and with ongoing continuity of care.    Thank you for allowing me to participate in the care of your patient. Please do not hesitate to contact me if you have any questions.     Sincerely,   Forum     Forum MD Daisha, PhD, FACC  Advanced Heart Failure/Transplantation/MCS  [facility]/[employer]      Please do not hesitate to contact me if you have any questions/concerns.     Sincerely,      Mark Ashton MD

## 2025-01-27 NOTE — NURSING NOTE
Chief Complaint   Patient presents with    RECHECK     Muscular dystrophy        Vitals were taken and medications were reconciled.   Iván Cabral, EMT  11:25 AM

## 2025-01-27 NOTE — PROGRESS NOTES
Patient was seen briefly with his spouse in multidisciplinary clinic to discuss mobility concerns. Due to time constraints with additional appointments, the visit was non-billable.     Answered questions about exercise recommendations, including primary focus on cardiovascular exercise and stretching and monitoring for red flags following exercise. He has a stationary bike he would like to use. Discussed starting at 5-10 minutes at low resistance and monitoring for red flags after. If well tolerated, discussed increasing time gradually, building up to 30 minutes eventually.   Also discussed walking with a walker for safe cardiovascular exercise.     Answered questions about ways to get up safely from chairs. Recommended higher chairs, bringing seat cushion with to restaurants to boost height, and use of UE support to push up to standing.     Total time spent with patient and spouse: 7 minutes    Radha Spears DPT  Physical Therapist  [facility] [address]  [address] [place] [address]    Appointments: [phone]

## 2025-01-27 NOTE — PROGRESS NOTES
Neurocardiomyopathy Clinic Progress Note    Name: Rajni Lara  : 1955  MRN: [id number]    2025    Dear Dr. Rodriguez, Dr. Hadley, and Dr. Santiago,     I had the pleasure of seeing Mr. Rajni Lara, a 69 year old man today in the [facility] Neurocardiomyopathy Clinic for a follow up visit for of myotonic dystrophy type 2 associated cardiomyopathy. He is accompanied by his wife.     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 He also has a family history of myotonic dystrophy in his sister and also probably his mother and maternal grandfather. He saw Dr. Cano in [facility] on 1/10/22. He had an echocardiogram in  and was incidentally idenitied to have atrial flutter which showed an LVEF of 40-45%. In  he had a successful cardioversion for atrial flutter. He had a echo in 2009 which showed an LVEF of 50-55% and normal RV function. He had a nuclear stress test in  which showed thinning at the inferior base and no ischemia. He has not had cardiovascular follow up since then.     I last saw him on  2024. 2023. He saw Dr. Hadley on 2024 for a follow up for CRT-P.  His CMR on 2025 demonstrates an LVEF of 46%, RVEF of 65%, LGE and a thin basal to mid anteroseptal stripe and inferolateral septal epicardial LGE and a nonischemic pattern is approximately 3% of the LV.  There is slight decrement of the LVEF compared to prior MRIs from 52 to 46%.  He recently had a trip when he was at a hotel due to falling on carpet and he had no significant injuries.  He is getting back to biking for 5 to 10 minutes at a time now.  He denies any chest pain at rest or with exertion.  He does note that walking faster at the airport or carrying bags and walking up stairs does result in some dyspnea though he is largely limited by muscle strength.  He feels he may have gained a  few pounds, but denies edema, orthopnea, or PND.  Denies palpitations, presyncope, or syncope.  He does have some occasional lightheadedness.  He notes that his appetite is good and his energy levels are unchanged.    His sister who had myotonic dystrophy type II and was 1 to 2 years younger than him recently .  He notes that she had an autoimmune disease as well as well as plexopathy and had significant leg pain.  She was not known to have any specific cardiovascular dysfunction.    He also saw Dr. Cano today who recommended NIV due to the reduced MIP.     REVIEW OF SYSTEMS: 10 point ROS neg other than the symptoms noted above in the HPI.    PAST MEDICAL HISTORY:   1. Myotonic dystrophy type 2 s/p CRT-P   2. History of atrial flutter s/p DCCV   3. History of non ischemic cardiomyopathy   4. BPH      ALLERGIES:    Allergies   Allergen Reactions    Succinylcholine Other (See Comments)    Sulfa Antibiotics Dermatitis, Headache and Photosensitivity       MEDICATIONS:   Current Outpatient Medications   Medication Sig Dispense Refill    acetaminophen (TYLENOL) 500 MG tablet Take 1,000 mg by mouth      acetaminophen-codeine (TYLENOL #3) 300-30 MG tablet Take 1-2 tablets by mouth every 4 hours as needed for moderate to severe pain 10 tablet 0    bisacodyl (DULCOLAX) 5 MG EC tablet Take 5 mg by mouth (Patient not taking: Reported on 2024)      finasteride (PROSCAR) 5 MG tablet Take 1 tablet by mouth every morning      losartan (COZAAR) 25 MG tablet Take 0.5 tablets (12.5 mg) by mouth daily 45 tablet 3    naproxen sodium 220 MG capsule Take 440 mg by mouth as needed (as needed)      nystatin-triamcinolone (MYCOLOG) 783233-9.1 UNIT/GM-% external ointment Apply topically 2 times daily      PEG 3350-KCl-NaBcb-NaCl-NaSulf 240 g SOLR 4,000 mLs (Patient not taking: Reported on 2024)      sildenafil (REVATIO) 20 MG tablet Take 20 mg by mouth daily as needed      tamsulosin (FLOMAX) 0.4 MG capsule TAKE  ONE CAPSULE BY MOUTH AT BEDTIME       No current facility-administered medications for this visit.     No ASA - delete from medication record     SOCIAL HISTORY: He is retired and was a pharmacist. He lives with his wife in [place], [place].   Tobacco: never  Alcohol: rarely   Illicits: now  His family is from [facility]     FAMILY HISTORY:  His family is from [facility]. He has multiple maternal family members with myotonic dystrophy.   Mom when 70 when falling and missing steps and in her 80s she was bedbound.   Sister is a physician and has MD2  Maternal grand father had a heart attack 59, and  and also had some leg weakness  Maternal uncle sudden death at age 64  Son and younger daughter tested negative. Older daughter is positive for MD2      PHYSICAL EXAM:   /74 (BP Location: Right arm, Patient Position: Chair, Cuff Size: Adult Regular)   Pulse 61   Wt 105.2 kg (232 lb)   SpO2 99%   BMI 29.79 kg/m    General: NAD,   HEENT: anicteric, normocephalic, atraumatic  Neck: JVD <7cm at 90 degrees  CV: RRR, s1 and s2, no murmurs, rubs, or gallops, CRT-P left upper chest  Lungs: CTAB, no crackles or wheezes, normal work of breathing  Abdomen: soft, non tender, non distended  Extremities: warm and well perfused      DIAGNOSTIC TESTING:    Latest Reference Range & Units 25 13:13   Sodium 135 - 145 mmol/L 142   Potassium 3.4 - 5.3 mmol/L 4.8   Chloride 98 - 107 mmol/L 107   Carbon Dioxide (CO2) 22 - 29 mmol/L 27   Urea Nitrogen 8.0 - 23.0 mg/dL 19.9   Creatinine 0.67 - 1.17 mg/dL 0.44 (L)   GFR Estimate >60 mL/min/1.73m2 >90   Calcium 8.8 - 10.4 mg/dL 9.4   Anion Gap 7 - 15 mmol/L 8   Magnesium 1.7 - 2.3 mg/dL 2.0   Albumin 3.5 - 5.2 g/dL 4.0   Protein Total 6.4 - 8.3 g/dL 6.6   Alkaline Phosphatase 40 - 150 U/L 79   ALT 0 - 70 U/L 30   AST 0 - 45 U/L 36   Bilirubin Total <=1.2 mg/dL 0.6   Cholesterol <200 mg/dL 186   CK Total 39 - 308 U/L 483 (H)   Patient Fasting?  No  No   Glucose 70 - 99 mg/dL 119 (H)   HDL  Cholesterol >=40 mg/dL 52   LDL Cholesterol Calculated <100 mg/dL 90   Non HDL Cholesterol <130 mg/dL 134 (H)   N-Terminal Pro Bnp 0 - 900 pg/mL 444   Triglycerides <150 mg/dL 219 (H)   Troponin T, High Sensitivity <=22 ng/L 66 (H)   TSH 0.30 - 4.20 uIU/mL 2.68   WBC 4.0 - 11.0 10e3/uL 7.6   Hemoglobin 13.3 - 17.7 g/dL 15.4   Hematocrit 40.0 - 53.0 % 44.2   Platelet Count 150 - 450 10e3/uL 188   RBC Count 4.40 - 5.90 10e6/uL 4.86   MCV 78 - 100 fL 91   MCH 26.5 - 33.0 pg 31.7   MCHC 31.5 - 36.5 g/dL 34.8   RDW 10.0 - 15.0 % 13.4   (L): Data is abnormally low  (H): Data is abnormally high      CMR 1/13/2024:   Clinical history: 69M with myotonic dystrophy type 2, >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      Comparison CMR: 02/20/2023     1. The LV is mildly dilated in cavity size, with normal wall thickness. The global systolic function is  mildly reduced. The LVEF is 46%. There is diffuse global hypokinesis.     2. The RV is normal in cavity size. The global systolic function is normal. The RVEF is 65%. An ICD lead is  visualized in the apical RV.      3. Both atria are mildly enlarged in size. A pacemaker lead is visualized in the right atrium.      4. There is no significant valvular disease.      5. Late gadolinium enhancement imaging demonstrates thin stripe of basal to mid anteroseptal and  inferoseptal epicardial LGE similar to previous in a nonischemic pattern.      6. There is no pericardial effusion or thickening.     7. There is no intracardiac thrombus.     CONCLUSIONS: Myotonic dystrophy with associated cardiomyopathy. The LV is mildly dilated with mildly  reduced function, LVEF 46%. Normal RV size and function, RVEF 65%. Stable thin stripe of basal to mid  anteroseptal and inferoseptal epicardial LGE in a nonischemic pattern. Compared to the study dated  2/20/2023, there is a  modest decrease LV function.      CRT-P interrogation: 1/27/25:   Device: Medtronic [id number] Percepta Quad CRT-P  Normal device function  Mode: DDD  bpm  AP: 6.9%  : 11.3%  BVP: 11.2%  Intrinsic Rhythm: VS 83 bpm  Thoracic Impedance:  Near reference line.   Short V-V intervals: 0  Lead Trends Appear Stable.  Estimated battery longevity to RRT = 12.3 years.  Battery voltage = 3.03 V.   Atrial Arrhythmia: None  AF [place] = 0%  Anticoagulant: None  Ventricular Arrhythmia: None  Setting Changes: None  Patient has an appointment to see Dr. Ashton today.       Echo 8/30/2023: Left ventricular function is decreased. The ejection fraction is 45-50%  (mildly reduced). Grade I or early diastolic dysfunction. Mild diffuse  hypokinesis is present.  Global right ventricular function is normal. The right ventricle is normal  size.  No significant valvular abnormalities.  The estimated PA systolic pressure is 35 mmHg.  IVC diameter >2.1 cm collapsing <50% with sniff suggests a high RA pressure  estimated at 15 mmHg or greater.  This study was compared with the study from 05/02/2022. The RA pressure is  higher. No significant changes in the biventricular function.  ___________________________________________________      CMR 2/20/2023: Clinical history: 67 year old with medical history of myotonic dystrophy type 2, >75 CCTG repeats of the  CNBP gene and strong family history of myotonic dystrophy, cardiomyopathy LV EF 46%, bifascicular block  with interval prolongation of FL and QRS here for CMR prior to cardiac resynchronization therapy.       Comparison CMR: 7/18/2022     1. The LV is normal in cavity size and wall thickness. The global systolic function is low normal. The LVEF  is 52%. There are no regional wall motion abnormalities.     2. The RV is normal in cavity size. The global systolic function is low normal. The RVEF is 51%.      3. Mild right atrial enlargement. Left atria is normal in size.     4. There is no  significant valvular disease.      5. Late gadolinium enhancement imaging shows small area of mid myocardial enhancement of the basal to mid  septum.      6. There is no pericardial effusion or thickening.     CONCLUSIONS: Low normal biventricular function. LV EF 52%. RVEF 51% minimal improvement from prior study.  No significant valvular disease. Mild non-ischemic fibrosis in the septum unchanged from prior study.   the septum.     ziopatch monitor demonstrated sinus with 2.3% PVCs, no VT, and brief AV Wenckebach      ECG 7/18/22: personally reviewed: sinus with PVC, bifascular block, QRS duration 146ms.     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 He has a history of atrial flutter and a mytonic dystrophy type 2 associated cardiomyopathy and progressive bifasicular block now status post CRT-P.      1. 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 Mild non ischemic, biventricular systolic heart failure  3. History of presyncope  3. History of atrial flutter  4. Bifascicular block s/p CRT-P 5/2023  5. Wenckebach   6. Hypertension history   7.  Reduced maximal inspiratory pressure on PFTs    Overall, Mr. Rajni Lara continues to do well.  He has had some progression of his muscle dysfunction but continues to be able to do exercise.  He is largely limited by muscle weakness rather than dyspnea.  He does have myotonic dystrophy associated cardiomyopathy.  His EF is largely unchanged and is mildly reduced.  While difficult to assess given his muscle symptoms he is largely class II.  He is euvolemic and well compensated.  He has normal endorgan function.  His N-terminal proBNP is normal however he continues to have an elevated high-sensitivity troponin T which can be seen in patients with muscular dystrophies.  He is tolerating low-dose  losartan without symptoms.  Given his fibrosis and reduced EF we discussed starting spironolactone which he is agreeable to start.  I am starting this preferentially over a beta-blocker given his bifascicular block.  Will repeat a BMP in 1 to 2 weeks locally.    On device interrogation, he has V pacing 11.3% and has corresponding BiV pacing of 11.2%, which is effectively 99% BiV pacing of the time he is pacing, which is appropriate for the indication.     Additionally given his reduced maximal inspiratory pressure, in agreement with Dr. Cano regarding early noninvasive ventilation as this also has cardiac benefits.     PLAN:   -spironolactone 12.5mg daily and BMP in 1-2 weeks   -follow up with Ms. Chandan in 6 months  -CMR with contrast with annual visit   -annual visit which can be coordinated with the visit with Dr. Cano       40 minutes on DOS for chart review, visit,  counseling, review of diagnostic testing, coordination of care and documentation.     The longitudinal plan of care for the diagnosis(es)/condition(s) as documented were addressed during this visit. Due to the added complexity in care, I will continue to support OJ in the subsequent management and with ongoing continuity of care.    Thank you for allowing me to participate in the care of your patient. Please do not hesitate to contact me if you have any questions.     Sincerely,   [place]     Mark Ashton MD, PhD, [facility]  Advanced Heart Failure/Transplantation/MCS  [facility]/[employer]

## 2025-01-27 NOTE — PROGRESS NOTES
Chief Complaint:  Myotonic dystrophy follow up    History of Present Illness:    Rajni Lara is a 68 year old man who presents to the [facility] Neuromuscular department [facility] clinic for follow up of genetically confirmed type 2 myotonic dystrophy.  Last seen in 1/2024 with trace worsening of his proximal leg weakness. No changes were made at this visit as he was relatively stable in exam and functional ability.     Today, he has no complaints. He had 2 falls this past year, he did not pick his foot up enough and tripped over the carpet. He does not use any assistive devices. He feels like he walks slower than before and has a harder time picking up his feet. He has difficulty walking up stairs, needing to crawl up them do to left leg weakness. No changes in the ability to use his hands, able to cut his food well. Does endorse difficulty rolling in bed. It is harder for him to stand up from a chair, often needs help doing so. He is able to do all his ADLs independently. If he eats to fast and then drinks something, he gets a pressure on his chest. No choking episodes, he is careful to chew his food well, generally. He sleeps well, wakes up once per night to urinate for his BPH. He snores, no waking up gasping for air. He sleeps with two pillows. Occasionally takes naps during the day,  Does feel short of breath when carrying heavy groceries or walking for a long time.       Medical Allergies:       Allergies   Allergen Reactions    Succinylcholine Other (See Comments)    Sulfa Antibiotics Dermatitis, Headache and Photosensitivity       Current Medications:      Current Outpatient Medications   Medication Sig Dispense Refill    acetaminophen (TYLENOL) 500 MG tablet Take 1,000 mg by mouth      acetaminophen-codeine (TYLENOL #3) 300-30 MG tablet Take 1-2 tablets by mouth every 4 hours as needed for moderate to severe pain 10 tablet 0    bisacodyl (DULCOLAX) 5 MG EC tablet Take 5 mg by mouth (Patient not  "taking: Reported on 1/22/2024)      finasteride (PROSCAR) 5 MG tablet Take 1 tablet by mouth every morning      losartan (COZAAR) 25 MG tablet Take 0.5 tablets (12.5 mg) by mouth daily 45 tablet 3    naproxen sodium 220 MG capsule Take 440 mg by mouth as needed (as needed)      nystatin-triamcinolone (MYCOLOG) 108265-1.1 UNIT/GM-% external ointment Apply topically 2 times daily      PEG 3350-KCl-NaBcb-NaCl-NaSulf 240 g SOLR 4,000 mLs (Patient not taking: Reported on 1/22/2024)      sildenafil (REVATIO) 20 MG tablet Take 20 mg by mouth daily as needed      tamsulosin (FLOMAX) 0.4 MG capsule TAKE ONE CAPSULE BY MOUTH AT BEDTIME       No current facility-administered medications for this visit.       Review of Systems:   A complete review of systems was obtained and was negative except for what was noted above.    Physical examination:    /84 (BP Location: Left arm, Patient Position: Sitting, Cuff Size: Adult Regular)   Pulse 61   Ht 1.88 m (6' 2\")   Wt 105.6 kg (232 lb 11.2 oz)   SpO2 98%   BMI 29.88 kg/m       General Appearance: NAD    Skin: There are no rashes or other skin lesions.    Musculoskeletal:  There is no scoliosis, lordosis, kyphosis, pes cavus, or hammertoes.    Neurologic examination:    Mental status:  Patient is alert, attentive, and oriented x 3.  Language is coherent and fluent without dysarthria or aphasia.  Memory, comprehension and ability to follow commands were intact.       Cranial nerves: Extraocular movements were full. There was no face, jaw, palate or tongue weakness or atrophy. Hearing was grossly intact.      Motor exam: no myotonia present    Right Left   Neck flexion 5    Neck extension: 5    Shoulder abduction:  4+ 4+   Elbow Flexion: 4+ 4+   Elbow Extension:  4- 4+   Wrist Extension:  5 5   Finger Extension:  5 5   FDI 5 5   APB 5 5   Finger Flexion 5 5   Wrist Flexion 5 5   Hip Flexion 4- 4+   Hip Extension 4 4   Knee Extension 5 5   Knee Flexion 5 5   Dorsiflexion 5 5 " "  Plantar flexion 5 5   Foot Inversion 5 5   Foot Eversion 5 5     Uses hands to get up from chair    Complex motor skills revealed normal coordination.  Finger-nose- finger and heel to shin were intact.  Mild right upper extremity tremor    Sensory exam revealed intact to light touch.     Gait mildly wide based     Deep tendon reflexes:   Right Left   Triceps 2 2   Biceps 2 2   Brachioradialis 2 2   Knee jerk 2 2   Ankle jerk 2 2     Most Recent [place] Pulmonary Function Testing    FVC-Pred   Date Value Ref Range Status   08/21/2024 4.67 L      FVC-Pre   Date Value Ref Range Status   08/21/2024 3.29 L      FVC-%Pred-Pre   Date Value Ref Range Status   08/21/2024 70 %      FEV1-Pre   Date Value Ref Range Status   08/21/2024 2.64 L      FEV1-%Pred-Pre   Date Value Ref Range Status   08/21/2024 74 %      FEV1FVC-Pred   Date Value Ref Range Status   08/21/2024 76 %      FEV1FVC-Pre   Date Value Ref Range Status   08/21/2024 80 %      No results found for: \"20029\"  FEFMax-Pred   Date Value Ref Range Status   08/21/2024 9.64 L/sec      FEFMax-Pre   Date Value Ref Range Status   08/21/2024 8.66 L/sec      FEFMax-%Pred-Pre   Date Value Ref Range Status   08/21/2024 89 %      ExpTime-Pre   Date Value Ref Range Status   08/21/2024 5.67 sec      FIFMax-Pre   Date Value Ref Range Status   08/21/2024 5.00 L/sec      FEV1FEV6-Pred   Date Value Ref Range Status   08/21/2024 78 %      FEV1FEV6-Pre   Date Value Ref Range Status   08/21/2024 81 %      No results found for: \"20055\"      Assessment/Plan:    Rajni Lara has genetically confirmed type 2 myotonic dystrophy. He continues to have progressive weakness in his proximal lower extremities, making it harder to climb stairs, walk, and stand up from a chair. 2 falls this past year. No dysphagia symptoms. Does endorse shortness of breath with exertion. He has mild worsening of his PFTs today, but given this and his known cardiomyopathy, we will do a trial of bipap. Also plan to " have him see PT this afternoon quickly. If this cannot be done, plan to send orders to [place] for PT. He will also cardiology.     Seen with Dr. Rodriguez,    Jr Lora, DO   Neurophysiology Fellow

## 2025-01-27 NOTE — PATIENT INSTRUCTIONS
We discussed safe exercise and transfers during your visit today.   I would recommend use of higher chairs whenever possible. When going out to restaurants, you may want to bring a seat cushion with you to boost your seat height.   For cardiovascular exercise, it is recommended to start at 5-10 minutes at low resistance on the bike. Monitor for any increased signs of fatigue, pain, weakness, or cramping that last >30 minutes after you exercise. You can gradually increase time up to 30 minutes of exercise if you are tolerating it well. You may also want to try some walking with your walker for exercise.     Exercise recommendations with Muscular Dystrophy (MD)    If you are new to a form of exercise it is important to start SLOWLY and see how your body responds. Please see the directions below on how to safely exercise with MD.    Stretching  Maintaining range of motion is important to reduce pain and tightness in weakened muscles. This should be done daily.    Your physical therapist will identify a few key stretches for you to do. Each stretch should be held for 30 secs without bouncing, 2-3 repetitions.      Cardiovascular training  Three options are known to be safe for persons with MD  - swimming  - biking (recumbent is usually best tolerated)  - walking    It is recommended that you complete cardiovascular training for up to 30 mins at a time, 3x per week.    A good starting point is usually 10 mins at a slow, comfortable pace. Allow a break day in between.   - If you are more fatigued or sore, then it was too much. You need to wait until your symptoms of over-use resolve before trying again. Next time try only 5 mins and see how you feel. If symptoms persist, then likely your body won't tolerate cardiovascular training.  - If you are feeling ok the next day after doing 10 mins of exercise, then you can try it again. Repeat your exercise 3 times before increasing the time by no more than 5 mins.  - Continue to  assess your symptoms along the way. If you have increased fatigue or soreness, then you will need to back up to the last level.  - The goal of cardiovascular training is continuous movement. Don't worry about increasing speed, incline, or resistance.   - Every day may be a little different depending on your fatigue and other activities you have been doing. Listen to your body and gauge your exercise routine accordingly.      Strength  Strength training of muscles that are weakened is to be avoided, as it will just over strain them and there is potential to increase the progression of weakness in these muscles.    The research does not state whether strength training muscles that are currently not affected by your MD with change the progression of your disease.     If you choose to strength train your non-affected  muscles, it is recommended to use a low level of weight with higher reps (10-20 reps). Make sure you have at least 1 rest day in between strength training sessions. If you notice increase in fatigue or soreness, then what you did was too much, and you should discontinue exercising those muscles.

## 2025-01-27 NOTE — NURSING NOTE
Chief Complaint   Patient presents with    Follow Up     RETURN MUSCULAR DYSTROPHY     Vitals were taken and medications reconciled.    Wayne Horton, EMT  2:06 PM

## 2025-01-27 NOTE — NURSING NOTE
Labs: Patient was given results of the laboratory testing obtained today. Patient was instructed to return for the next laboratory testing in 2 weeks. Patient demonstrated understanding of this information and agreed to call with further questions or concerns.     Med Reconcile: Reviewed and verified all current medications with the patient. The updated medication list was printed and given to the patient.    New Medication: Patient was educated regarding newly prescribed medication, including discussion of  the indication, administration, side effects, and when to report to MD or RN. Patient demonstrated understanding of this information and agreed to call with further questions or concerns. Start spironolactone 12.5 mg once daily.    Return Appointment: Patient given instructions regarding scheduling next clinic visit. Patient demonstrated understanding of this information and agreed to call with further questions or concerns. Renetta Hawley in 6 months, Dr. Ashton annual to be coordinated with Dr. Rodriguez. Cmri a few weeks prior to appointment - can be done at [facility] or [facility].    Patient stated he understood all health information given and agreed to call with further questions or concerns.    Joan Tolbert, RN

## 2025-01-28 ENCOUNTER — TELEPHONE (OUTPATIENT)
Dept: CARDIOLOGY | Facility: CLINIC | Age: 70
End: 2025-01-28
Payer: COMMERCIAL

## 2025-01-28 DIAGNOSIS — G71.11 MYOTONIC DYSTROPHY, TYPE 2 (H): Primary | ICD-10-CM

## 2025-01-28 NOTE — TELEPHONE ENCOUNTER
Left Voicemail (1st Attempt) for the patient to call back and schedule the following:    Appointment type: RTN MD  Provider: EMERITA DE LA CRUZ   Return date: 7/27/25  Specialty phone number: [phone] OPT 1   Additional appointment(s) needed: LABS , DEVICE CHECK   Additonal Notes: N/A

## 2025-02-03 ENCOUNTER — MYC MEDICAL ADVICE (OUTPATIENT)
Dept: CARDIOLOGY | Facility: CLINIC | Age: 70
End: 2025-02-03
Payer: COMMERCIAL

## 2025-04-24 ENCOUNTER — TELEPHONE (OUTPATIENT)
Dept: CARDIOLOGY | Facility: CLINIC | Age: 70
End: 2025-04-24
Payer: COMMERCIAL

## 2025-04-24 NOTE — TELEPHONE ENCOUNTER
Left Voicemail (1st Attempt) and Sent Mychart (1st Attempt) for the patient to call back and schedule the following:    Appointment type: RTN EP  Provider: EP JONH  Return date: AUGUST 2025 OR AFTER  Specialty phone number: [phone] OPT 1  Additional appointment(s) needed: DEVICE CHECK PRIOR  Additonal Notes: ANNUAL FOLLOW-UP WITH DEVICE CHECK

## 2025-04-30 ENCOUNTER — ANCILLARY PROCEDURE (OUTPATIENT)
Dept: CARDIOLOGY | Facility: CLINIC | Age: 70
End: 2025-04-30
Attending: INTERNAL MEDICINE
Payer: COMMERCIAL

## 2025-04-30 DIAGNOSIS — Z95.0 CARDIAC RESYNCHRONIZATION THERAPY PACEMAKER (CRT-P) IN PLACE: ICD-10-CM

## 2025-04-30 LAB
MDC_IDC_EPISODE_DTM: NORMAL
MDC_IDC_EPISODE_DURATION: 11 S
MDC_IDC_EPISODE_DURATION: 17 S
MDC_IDC_EPISODE_DURATION: 194 S
MDC_IDC_EPISODE_DURATION: 28 S
MDC_IDC_EPISODE_DURATION: 71 S
MDC_IDC_EPISODE_DURATION: 8 S
MDC_IDC_EPISODE_DURATION: 85 S
MDC_IDC_EPISODE_ID: 1515
MDC_IDC_EPISODE_ID: 1516
MDC_IDC_EPISODE_ID: 1517
MDC_IDC_EPISODE_ID: 1518
MDC_IDC_EPISODE_ID: 1519
MDC_IDC_EPISODE_ID: NORMAL
MDC_IDC_EPISODE_TYPE: NORMAL
MDC_IDC_LEAD_CONNECTION_STATUS: NORMAL
MDC_IDC_LEAD_IMPLANT_DT: NORMAL
MDC_IDC_LEAD_LOCATION: NORMAL
MDC_IDC_LEAD_LOCATION_DETAIL_1: NORMAL
MDC_IDC_LEAD_MFG: NORMAL
MDC_IDC_LEAD_MODEL: NORMAL
MDC_IDC_LEAD_POLARITY_TYPE: NORMAL
MDC_IDC_LEAD_SERIAL: NORMAL
MDC_IDC_LEAD_SPECIAL_FUNCTION: NORMAL
MDC_IDC_MSMT_BATTERY_DTM: NORMAL
MDC_IDC_MSMT_BATTERY_REMAINING_LONGEVITY: 144 MO
MDC_IDC_MSMT_BATTERY_RRT_TRIGGER: 2.6
MDC_IDC_MSMT_BATTERY_STATUS: NORMAL
MDC_IDC_MSMT_BATTERY_VOLTAGE: 3.02 V
MDC_IDC_MSMT_LEADCHNL_LV_IMPEDANCE_VALUE: 1178 OHM
MDC_IDC_MSMT_LEADCHNL_LV_IMPEDANCE_VALUE: 1197 OHM
MDC_IDC_MSMT_LEADCHNL_LV_IMPEDANCE_VALUE: 1273 OHM
MDC_IDC_MSMT_LEADCHNL_LV_IMPEDANCE_VALUE: 342 OHM
MDC_IDC_MSMT_LEADCHNL_LV_IMPEDANCE_VALUE: 399 OHM
MDC_IDC_MSMT_LEADCHNL_LV_IMPEDANCE_VALUE: 627 OHM
MDC_IDC_MSMT_LEADCHNL_LV_IMPEDANCE_VALUE: 665 OHM
MDC_IDC_MSMT_LEADCHNL_LV_IMPEDANCE_VALUE: 874 OHM
MDC_IDC_MSMT_LEADCHNL_LV_IMPEDANCE_VALUE: 931 OHM
MDC_IDC_MSMT_LEADCHNL_LV_IMPEDANCE_VALUE: 969 OHM
MDC_IDC_MSMT_LEADCHNL_LV_PACING_THRESHOLD_AMPLITUDE: 1.12 V
MDC_IDC_MSMT_LEADCHNL_LV_PACING_THRESHOLD_PULSEWIDTH: 0.8 MS
MDC_IDC_MSMT_LEADCHNL_RA_IMPEDANCE_VALUE: 304 OHM
MDC_IDC_MSMT_LEADCHNL_RA_IMPEDANCE_VALUE: 418 OHM
MDC_IDC_MSMT_LEADCHNL_RA_PACING_THRESHOLD_AMPLITUDE: 0.5 V
MDC_IDC_MSMT_LEADCHNL_RA_PACING_THRESHOLD_PULSEWIDTH: 0.4 MS
MDC_IDC_MSMT_LEADCHNL_RA_SENSING_INTR_AMPL: 1.25 MV
MDC_IDC_MSMT_LEADCHNL_RV_IMPEDANCE_VALUE: 342 OHM
MDC_IDC_MSMT_LEADCHNL_RV_IMPEDANCE_VALUE: 399 OHM
MDC_IDC_MSMT_LEADCHNL_RV_PACING_THRESHOLD_AMPLITUDE: 0.62 V
MDC_IDC_MSMT_LEADCHNL_RV_PACING_THRESHOLD_PULSEWIDTH: 0.4 MS
MDC_IDC_MSMT_LEADCHNL_RV_SENSING_INTR_AMPL: 13.75 MV
MDC_IDC_PG_IMPLANT_DTM: NORMAL
MDC_IDC_PG_MFG: NORMAL
MDC_IDC_PG_MODEL: NORMAL
MDC_IDC_PG_SERIAL: NORMAL
MDC_IDC_PG_TYPE: NORMAL
MDC_IDC_SESS_CLINIC_NAME: NORMAL
MDC_IDC_SESS_DTM: NORMAL
MDC_IDC_SESS_TYPE: NORMAL
MDC_IDC_SET_BRADY_AT_MODE_SWITCH_RATE: 171 {BEATS}/MIN
MDC_IDC_SET_BRADY_LOWRATE: 50 {BEATS}/MIN
MDC_IDC_SET_BRADY_MAX_SENSOR_RATE: 130 {BEATS}/MIN
MDC_IDC_SET_BRADY_MAX_TRACKING_RATE: 130 {BEATS}/MIN
MDC_IDC_SET_BRADY_MODE: NORMAL
MDC_IDC_SET_BRADY_PAV_DELAY_HIGH: 100 MS
MDC_IDC_SET_BRADY_PAV_DELAY_LOW: 350 MS
MDC_IDC_SET_BRADY_SAV_DELAY_HIGH: 70 MS
MDC_IDC_SET_BRADY_SAV_DELAY_LOW: 300 MS
MDC_IDC_SET_CRT_LVRV_DELAY: 0 MS
MDC_IDC_SET_CRT_PACED_CHAMBERS: NORMAL
MDC_IDC_SET_LEADCHNL_LV_PACING_AMPLITUDE: 1.75 V
MDC_IDC_SET_LEADCHNL_LV_PACING_ANODE_ELECTRODE_1: NORMAL
MDC_IDC_SET_LEADCHNL_LV_PACING_ANODE_LOCATION_1: NORMAL
MDC_IDC_SET_LEADCHNL_LV_PACING_CAPTURE_MODE: NORMAL
MDC_IDC_SET_LEADCHNL_LV_PACING_CATHODE_ELECTRODE_1: NORMAL
MDC_IDC_SET_LEADCHNL_LV_PACING_CATHODE_LOCATION_1: NORMAL
MDC_IDC_SET_LEADCHNL_LV_PACING_POLARITY: NORMAL
MDC_IDC_SET_LEADCHNL_LV_PACING_PULSEWIDTH: 0.8 MS
MDC_IDC_SET_LEADCHNL_RA_PACING_AMPLITUDE: 1.5 V
MDC_IDC_SET_LEADCHNL_RA_PACING_ANODE_ELECTRODE_1: NORMAL
MDC_IDC_SET_LEADCHNL_RA_PACING_ANODE_LOCATION_1: NORMAL
MDC_IDC_SET_LEADCHNL_RA_PACING_CAPTURE_MODE: NORMAL
MDC_IDC_SET_LEADCHNL_RA_PACING_CATHODE_ELECTRODE_1: NORMAL
MDC_IDC_SET_LEADCHNL_RA_PACING_CATHODE_LOCATION_1: NORMAL
MDC_IDC_SET_LEADCHNL_RA_PACING_POLARITY: NORMAL
MDC_IDC_SET_LEADCHNL_RA_PACING_PULSEWIDTH: 0.4 MS
MDC_IDC_SET_LEADCHNL_RA_SENSING_ANODE_ELECTRODE_1: NORMAL
MDC_IDC_SET_LEADCHNL_RA_SENSING_ANODE_LOCATION_1: NORMAL
MDC_IDC_SET_LEADCHNL_RA_SENSING_CATHODE_ELECTRODE_1: NORMAL
MDC_IDC_SET_LEADCHNL_RA_SENSING_CATHODE_LOCATION_1: NORMAL
MDC_IDC_SET_LEADCHNL_RA_SENSING_POLARITY: NORMAL
MDC_IDC_SET_LEADCHNL_RA_SENSING_SENSITIVITY: 0.3 MV
MDC_IDC_SET_LEADCHNL_RV_PACING_AMPLITUDE: 2 V
MDC_IDC_SET_LEADCHNL_RV_PACING_ANODE_ELECTRODE_1: NORMAL
MDC_IDC_SET_LEADCHNL_RV_PACING_ANODE_LOCATION_1: NORMAL
MDC_IDC_SET_LEADCHNL_RV_PACING_CAPTURE_MODE: NORMAL
MDC_IDC_SET_LEADCHNL_RV_PACING_CATHODE_ELECTRODE_1: NORMAL
MDC_IDC_SET_LEADCHNL_RV_PACING_CATHODE_LOCATION_1: NORMAL
MDC_IDC_SET_LEADCHNL_RV_PACING_POLARITY: NORMAL
MDC_IDC_SET_LEADCHNL_RV_PACING_PULSEWIDTH: 0.4 MS
MDC_IDC_SET_LEADCHNL_RV_SENSING_ANODE_ELECTRODE_1: NORMAL
MDC_IDC_SET_LEADCHNL_RV_SENSING_ANODE_LOCATION_1: NORMAL
MDC_IDC_SET_LEADCHNL_RV_SENSING_CATHODE_ELECTRODE_1: NORMAL
MDC_IDC_SET_LEADCHNL_RV_SENSING_CATHODE_LOCATION_1: NORMAL
MDC_IDC_SET_LEADCHNL_RV_SENSING_POLARITY: NORMAL
MDC_IDC_SET_LEADCHNL_RV_SENSING_SENSITIVITY: 0.9 MV
MDC_IDC_SET_ZONE_DETECTION_INTERVAL: 350 MS
MDC_IDC_SET_ZONE_DETECTION_INTERVAL: 400 MS
MDC_IDC_SET_ZONE_STATUS: NORMAL
MDC_IDC_SET_ZONE_STATUS: NORMAL
MDC_IDC_SET_ZONE_TYPE: NORMAL
MDC_IDC_SET_ZONE_VENDOR_TYPE: NORMAL
MDC_IDC_STAT_AT_BURDEN_PERCENT: 0.1 %
MDC_IDC_STAT_AT_DTM_END: NORMAL
MDC_IDC_STAT_AT_DTM_START: NORMAL
MDC_IDC_STAT_BRADY_AP_VP_PERCENT: 2.06 %
MDC_IDC_STAT_BRADY_AP_VS_PERCENT: 4.01 %
MDC_IDC_STAT_BRADY_AS_VP_PERCENT: 16.39 %
MDC_IDC_STAT_BRADY_AS_VS_PERCENT: 77.54 %
MDC_IDC_STAT_BRADY_DTM_END: NORMAL
MDC_IDC_STAT_BRADY_DTM_START: NORMAL
MDC_IDC_STAT_BRADY_RA_PERCENT_PACED: 6.66 %
MDC_IDC_STAT_BRADY_RV_PERCENT_PACED: 18.45 %
MDC_IDC_STAT_CRT_DTM_END: NORMAL
MDC_IDC_STAT_CRT_DTM_START: NORMAL
MDC_IDC_STAT_CRT_LV_PERCENT_PACED: 18.4 %
MDC_IDC_STAT_CRT_PERCENT_PACED: 18.4 %
MDC_IDC_STAT_EPISODE_RECENT_COUNT: 0
MDC_IDC_STAT_EPISODE_RECENT_COUNT_DTM_END: NORMAL
MDC_IDC_STAT_EPISODE_RECENT_COUNT_DTM_START: NORMAL
MDC_IDC_STAT_EPISODE_TOTAL_COUNT: 0
MDC_IDC_STAT_EPISODE_TOTAL_COUNT: 1
MDC_IDC_STAT_EPISODE_TOTAL_COUNT: 24
MDC_IDC_STAT_EPISODE_TOTAL_COUNT_DTM_END: NORMAL
MDC_IDC_STAT_EPISODE_TOTAL_COUNT_DTM_START: NORMAL
MDC_IDC_STAT_EPISODE_TYPE: NORMAL
MDC_IDC_STAT_TACHYTHERAPY_RECENT_DTM_END: NORMAL
MDC_IDC_STAT_TACHYTHERAPY_RECENT_DTM_START: NORMAL
MDC_IDC_STAT_TACHYTHERAPY_TOTAL_DTM_END: NORMAL
MDC_IDC_STAT_TACHYTHERAPY_TOTAL_DTM_START: NORMAL

## 2025-04-30 PROCEDURE — 93294 REM INTERROG EVL PM/LDLS PM: CPT | Performed by: INTERNAL MEDICINE

## 2025-04-30 PROCEDURE — 93296 REM INTERROG EVL PM/IDS: CPT

## 2025-07-10 DIAGNOSIS — Z13.6 ENCOUNTER FOR SCREENING FOR CARDIOVASCULAR DISORDERS: ICD-10-CM

## 2025-07-10 DIAGNOSIS — G71.11 MYOTONIC DYSTROPHY, TYPE 2 (H): Primary | ICD-10-CM

## 2025-07-14 ENCOUNTER — ANCILLARY PROCEDURE (OUTPATIENT)
Dept: CARDIOLOGY | Facility: CLINIC | Age: 70
End: 2025-07-14
Attending: INTERNAL MEDICINE
Payer: COMMERCIAL

## 2025-07-14 ENCOUNTER — LAB (OUTPATIENT)
Dept: LAB | Facility: CLINIC | Age: 70
End: 2025-07-14
Payer: COMMERCIAL

## 2025-07-14 VITALS
WEIGHT: 240.6 LBS | SYSTOLIC BLOOD PRESSURE: 117 MMHG | DIASTOLIC BLOOD PRESSURE: 69 MMHG | OXYGEN SATURATION: 95 % | HEART RATE: 60 BPM | BODY MASS INDEX: 30.89 KG/M2

## 2025-07-14 DIAGNOSIS — I42.8 NICM (NONISCHEMIC CARDIOMYOPATHY) (H): Primary | ICD-10-CM

## 2025-07-14 DIAGNOSIS — Z95.0 CARDIAC RESYNCHRONIZATION THERAPY PACEMAKER (CRT-P) IN PLACE: ICD-10-CM

## 2025-07-14 DIAGNOSIS — Z13.6 ENCOUNTER FOR SCREENING FOR CARDIOVASCULAR DISORDERS: ICD-10-CM

## 2025-07-14 DIAGNOSIS — G71.11 MYOTONIC DYSTROPHY, TYPE 2 (H): ICD-10-CM

## 2025-07-14 DIAGNOSIS — I50.22 CHRONIC HEART FAILURE WITH MILDLY REDUCED EJECTION FRACTION (HFMREF, 41-49%) (H): ICD-10-CM

## 2025-07-14 DIAGNOSIS — I42.8 NICM (NONISCHEMIC CARDIOMYOPATHY) (H): ICD-10-CM

## 2025-07-14 LAB
ANION GAP SERPL CALCULATED.3IONS-SCNC: 8 MMOL/L (ref 7–15)
BUN SERPL-MCNC: 17.6 MG/DL (ref 8–23)
CALCIUM SERPL-MCNC: 9.9 MG/DL (ref 8.8–10.4)
CHLORIDE SERPL-SCNC: 106 MMOL/L (ref 98–107)
CK SERPL-CCNC: 1323 U/L (ref 39–308)
CREAT SERPL-MCNC: 0.49 MG/DL (ref 0.67–1.17)
EGFRCR SERPLBLD CKD-EPI 2021: >90 ML/MIN/1.73M2
GLUCOSE SERPL-MCNC: 90 MG/DL (ref 70–99)
HCO3 SERPL-SCNC: 27 MMOL/L (ref 22–29)
MDC_IDC_EPISODE_DTM: NORMAL
MDC_IDC_EPISODE_DURATION: 117 S
MDC_IDC_EPISODE_DURATION: 127 S
MDC_IDC_EPISODE_DURATION: 14 S
MDC_IDC_EPISODE_DURATION: 28 S
MDC_IDC_EPISODE_DURATION: 31 S
MDC_IDC_EPISODE_DURATION: 751 S
MDC_IDC_EPISODE_DURATION: 79 S
MDC_IDC_EPISODE_ID: 1745
MDC_IDC_EPISODE_ID: 1746
MDC_IDC_EPISODE_ID: 1747
MDC_IDC_EPISODE_ID: 1748
MDC_IDC_EPISODE_ID: 1749
MDC_IDC_EPISODE_ID: NORMAL
MDC_IDC_EPISODE_TYPE: NORMAL
MDC_IDC_LEAD_CONNECTION_STATUS: NORMAL
MDC_IDC_LEAD_IMPLANT_DT: NORMAL
MDC_IDC_LEAD_LOCATION: NORMAL
MDC_IDC_LEAD_LOCATION_DETAIL_1: NORMAL
MDC_IDC_LEAD_MFG: NORMAL
MDC_IDC_LEAD_MODEL: NORMAL
MDC_IDC_LEAD_POLARITY_TYPE: NORMAL
MDC_IDC_LEAD_SERIAL: NORMAL
MDC_IDC_LEAD_SPECIAL_FUNCTION: NORMAL
MDC_IDC_MSMT_BATTERY_DTM: NORMAL
MDC_IDC_MSMT_BATTERY_REMAINING_LONGEVITY: 142 MO
MDC_IDC_MSMT_BATTERY_RRT_TRIGGER: 2.6
MDC_IDC_MSMT_BATTERY_STATUS: NORMAL
MDC_IDC_MSMT_BATTERY_VOLTAGE: 3.02 V
MDC_IDC_MSMT_LEADCHNL_LV_IMPEDANCE_VALUE: 1007 OHM
MDC_IDC_MSMT_LEADCHNL_LV_IMPEDANCE_VALUE: 1083 OHM
MDC_IDC_MSMT_LEADCHNL_LV_IMPEDANCE_VALUE: 1254 OHM
MDC_IDC_MSMT_LEADCHNL_LV_IMPEDANCE_VALUE: 342 OHM
MDC_IDC_MSMT_LEADCHNL_LV_IMPEDANCE_VALUE: 437 OHM
MDC_IDC_MSMT_LEADCHNL_LV_IMPEDANCE_VALUE: 513 OHM
MDC_IDC_MSMT_LEADCHNL_LV_IMPEDANCE_VALUE: 646 OHM
MDC_IDC_MSMT_LEADCHNL_LV_IMPEDANCE_VALUE: 760 OHM
MDC_IDC_MSMT_LEADCHNL_LV_IMPEDANCE_VALUE: 817 OHM
MDC_IDC_MSMT_LEADCHNL_LV_IMPEDANCE_VALUE: 855 OHM
MDC_IDC_MSMT_LEADCHNL_LV_PACING_THRESHOLD_AMPLITUDE: 1 V
MDC_IDC_MSMT_LEADCHNL_LV_PACING_THRESHOLD_PULSEWIDTH: 0.8 MS
MDC_IDC_MSMT_LEADCHNL_RA_IMPEDANCE_VALUE: 342 OHM
MDC_IDC_MSMT_LEADCHNL_RA_IMPEDANCE_VALUE: 456 OHM
MDC_IDC_MSMT_LEADCHNL_RA_PACING_THRESHOLD_AMPLITUDE: 0.5 V
MDC_IDC_MSMT_LEADCHNL_RA_PACING_THRESHOLD_PULSEWIDTH: 0.4 MS
MDC_IDC_MSMT_LEADCHNL_RA_SENSING_INTR_AMPL: 3.38 MV
MDC_IDC_MSMT_LEADCHNL_RA_SENSING_INTR_AMPL: 4.25 MV
MDC_IDC_MSMT_LEADCHNL_RV_IMPEDANCE_VALUE: 361 OHM
MDC_IDC_MSMT_LEADCHNL_RV_IMPEDANCE_VALUE: 437 OHM
MDC_IDC_MSMT_LEADCHNL_RV_PACING_THRESHOLD_AMPLITUDE: 0.5 V
MDC_IDC_MSMT_LEADCHNL_RV_PACING_THRESHOLD_PULSEWIDTH: 0.4 MS
MDC_IDC_MSMT_LEADCHNL_RV_SENSING_INTR_AMPL: 13 MV
MDC_IDC_MSMT_LEADCHNL_RV_SENSING_INTR_AMPL: 13.62 MV
MDC_IDC_PG_IMPLANT_DTM: NORMAL
MDC_IDC_PG_MFG: NORMAL
MDC_IDC_PG_MODEL: NORMAL
MDC_IDC_PG_SERIAL: NORMAL
MDC_IDC_PG_TYPE: NORMAL
MDC_IDC_SESS_CLINIC_NAME: NORMAL
MDC_IDC_SESS_DTM: NORMAL
MDC_IDC_SESS_TYPE: NORMAL
MDC_IDC_SET_BRADY_AT_MODE_SWITCH_RATE: 171 {BEATS}/MIN
MDC_IDC_SET_BRADY_LOWRATE: 50 {BEATS}/MIN
MDC_IDC_SET_BRADY_MAX_SENSOR_RATE: 130 {BEATS}/MIN
MDC_IDC_SET_BRADY_MAX_TRACKING_RATE: 130 {BEATS}/MIN
MDC_IDC_SET_BRADY_MODE: NORMAL
MDC_IDC_SET_BRADY_PAV_DELAY_HIGH: 100 MS
MDC_IDC_SET_BRADY_PAV_DELAY_LOW: 350 MS
MDC_IDC_SET_BRADY_SAV_DELAY_HIGH: 70 MS
MDC_IDC_SET_BRADY_SAV_DELAY_LOW: 300 MS
MDC_IDC_SET_CRT_LVRV_DELAY: 0 MS
MDC_IDC_SET_CRT_PACED_CHAMBERS: NORMAL
MDC_IDC_SET_LEADCHNL_LV_PACING_AMPLITUDE: 1.5 V
MDC_IDC_SET_LEADCHNL_LV_PACING_ANODE_ELECTRODE_1: NORMAL
MDC_IDC_SET_LEADCHNL_LV_PACING_ANODE_LOCATION_1: NORMAL
MDC_IDC_SET_LEADCHNL_LV_PACING_CAPTURE_MODE: NORMAL
MDC_IDC_SET_LEADCHNL_LV_PACING_CATHODE_ELECTRODE_1: NORMAL
MDC_IDC_SET_LEADCHNL_LV_PACING_CATHODE_LOCATION_1: NORMAL
MDC_IDC_SET_LEADCHNL_LV_PACING_POLARITY: NORMAL
MDC_IDC_SET_LEADCHNL_LV_PACING_PULSEWIDTH: 0.8 MS
MDC_IDC_SET_LEADCHNL_RA_PACING_AMPLITUDE: 1.5 V
MDC_IDC_SET_LEADCHNL_RA_PACING_ANODE_ELECTRODE_1: NORMAL
MDC_IDC_SET_LEADCHNL_RA_PACING_ANODE_LOCATION_1: NORMAL
MDC_IDC_SET_LEADCHNL_RA_PACING_CAPTURE_MODE: NORMAL
MDC_IDC_SET_LEADCHNL_RA_PACING_CATHODE_ELECTRODE_1: NORMAL
MDC_IDC_SET_LEADCHNL_RA_PACING_CATHODE_LOCATION_1: NORMAL
MDC_IDC_SET_LEADCHNL_RA_PACING_POLARITY: NORMAL
MDC_IDC_SET_LEADCHNL_RA_PACING_PULSEWIDTH: 0.4 MS
MDC_IDC_SET_LEADCHNL_RA_SENSING_ANODE_ELECTRODE_1: NORMAL
MDC_IDC_SET_LEADCHNL_RA_SENSING_ANODE_LOCATION_1: NORMAL
MDC_IDC_SET_LEADCHNL_RA_SENSING_CATHODE_ELECTRODE_1: NORMAL
MDC_IDC_SET_LEADCHNL_RA_SENSING_CATHODE_LOCATION_1: NORMAL
MDC_IDC_SET_LEADCHNL_RA_SENSING_POLARITY: NORMAL
MDC_IDC_SET_LEADCHNL_RA_SENSING_SENSITIVITY: 0.3 MV
MDC_IDC_SET_LEADCHNL_RV_PACING_AMPLITUDE: 2 V
MDC_IDC_SET_LEADCHNL_RV_PACING_ANODE_ELECTRODE_1: NORMAL
MDC_IDC_SET_LEADCHNL_RV_PACING_ANODE_LOCATION_1: NORMAL
MDC_IDC_SET_LEADCHNL_RV_PACING_CAPTURE_MODE: NORMAL
MDC_IDC_SET_LEADCHNL_RV_PACING_CATHODE_ELECTRODE_1: NORMAL
MDC_IDC_SET_LEADCHNL_RV_PACING_CATHODE_LOCATION_1: NORMAL
MDC_IDC_SET_LEADCHNL_RV_PACING_POLARITY: NORMAL
MDC_IDC_SET_LEADCHNL_RV_PACING_PULSEWIDTH: 0.4 MS
MDC_IDC_SET_LEADCHNL_RV_SENSING_ANODE_ELECTRODE_1: NORMAL
MDC_IDC_SET_LEADCHNL_RV_SENSING_ANODE_LOCATION_1: NORMAL
MDC_IDC_SET_LEADCHNL_RV_SENSING_CATHODE_ELECTRODE_1: NORMAL
MDC_IDC_SET_LEADCHNL_RV_SENSING_CATHODE_LOCATION_1: NORMAL
MDC_IDC_SET_LEADCHNL_RV_SENSING_POLARITY: NORMAL
MDC_IDC_SET_LEADCHNL_RV_SENSING_SENSITIVITY: 0.9 MV
MDC_IDC_SET_ZONE_DETECTION_INTERVAL: 350 MS
MDC_IDC_SET_ZONE_DETECTION_INTERVAL: 400 MS
MDC_IDC_SET_ZONE_STATUS: NORMAL
MDC_IDC_SET_ZONE_STATUS: NORMAL
MDC_IDC_SET_ZONE_TYPE: NORMAL
MDC_IDC_SET_ZONE_VENDOR_TYPE: NORMAL
MDC_IDC_STAT_AT_BURDEN_PERCENT: 0.1 %
MDC_IDC_STAT_AT_DTM_END: NORMAL
MDC_IDC_STAT_AT_DTM_START: NORMAL
MDC_IDC_STAT_BRADY_AP_VP_PERCENT: 2.24 %
MDC_IDC_STAT_BRADY_AP_VS_PERCENT: 4.46 %
MDC_IDC_STAT_BRADY_AS_VP_PERCENT: 16.99 %
MDC_IDC_STAT_BRADY_AS_VS_PERCENT: 76.3 %
MDC_IDC_STAT_BRADY_DTM_END: NORMAL
MDC_IDC_STAT_BRADY_DTM_START: NORMAL
MDC_IDC_STAT_BRADY_RA_PERCENT_PACED: 7.55 %
MDC_IDC_STAT_BRADY_RV_PERCENT_PACED: 19.23 %
MDC_IDC_STAT_CRT_DTM_END: NORMAL
MDC_IDC_STAT_CRT_DTM_START: NORMAL
MDC_IDC_STAT_CRT_LV_PERCENT_PACED: 19.18 %
MDC_IDC_STAT_CRT_PERCENT_PACED: 19.18 %
MDC_IDC_STAT_EPISODE_RECENT_COUNT: 0
MDC_IDC_STAT_EPISODE_RECENT_COUNT_DTM_END: NORMAL
MDC_IDC_STAT_EPISODE_RECENT_COUNT_DTM_START: NORMAL
MDC_IDC_STAT_EPISODE_TOTAL_COUNT: 0
MDC_IDC_STAT_EPISODE_TOTAL_COUNT: 1
MDC_IDC_STAT_EPISODE_TOTAL_COUNT: 24
MDC_IDC_STAT_EPISODE_TOTAL_COUNT_DTM_END: NORMAL
MDC_IDC_STAT_EPISODE_TOTAL_COUNT_DTM_START: NORMAL
MDC_IDC_STAT_EPISODE_TYPE: NORMAL
MDC_IDC_STAT_TACHYTHERAPY_RECENT_DTM_END: NORMAL
MDC_IDC_STAT_TACHYTHERAPY_RECENT_DTM_START: NORMAL
MDC_IDC_STAT_TACHYTHERAPY_TOTAL_DTM_END: NORMAL
MDC_IDC_STAT_TACHYTHERAPY_TOTAL_DTM_START: NORMAL
NT-PROBNP SERPL-MCNC: 482 PG/ML (ref 0–229)
POTASSIUM SERPL-SCNC: 5.3 MMOL/L (ref 3.4–5.3)
SODIUM SERPL-SCNC: 141 MMOL/L (ref 135–145)
TROPONIN T SERPL HS-MCNC: 79 NG/L

## 2025-07-14 PROCEDURE — G0463 HOSPITAL OUTPT CLINIC VISIT: HCPCS | Performed by: NURSE PRACTITIONER

## 2025-07-14 PROCEDURE — 99215 OFFICE O/P EST HI 40 MIN: CPT | Performed by: NURSE PRACTITIONER

## 2025-07-14 PROCEDURE — 3078F DIAST BP <80 MM HG: CPT | Performed by: NURSE PRACTITIONER

## 2025-07-14 PROCEDURE — 80048 BASIC METABOLIC PNL TOTAL CA: CPT | Performed by: PATHOLOGY

## 2025-07-14 PROCEDURE — 82550 ASSAY OF CK (CPK): CPT | Performed by: PATHOLOGY

## 2025-07-14 PROCEDURE — 83880 ASSAY OF NATRIURETIC PEPTIDE: CPT | Performed by: PATHOLOGY

## 2025-07-14 PROCEDURE — 1126F AMNT PAIN NOTED NONE PRSNT: CPT | Performed by: NURSE PRACTITIONER

## 2025-07-14 PROCEDURE — 3074F SYST BP LT 130 MM HG: CPT | Performed by: NURSE PRACTITIONER

## 2025-07-14 PROCEDURE — 93281 PM DEVICE PROGR EVAL MULTI: CPT | Performed by: INTERNAL MEDICINE

## 2025-07-14 PROCEDURE — 36415 COLL VENOUS BLD VENIPUNCTURE: CPT | Performed by: PATHOLOGY

## 2025-07-14 PROCEDURE — 84484 ASSAY OF TROPONIN QUANT: CPT | Performed by: PATHOLOGY

## 2025-07-14 RX ORDER — TORSEMIDE 10 MG/1
10 TABLET ORAL DAILY
Qty: 90 TABLET | Refills: 3 | Status: SHIPPED | OUTPATIENT
Start: 2025-07-14

## 2025-07-14 ASSESSMENT — PAIN SCALES - GENERAL: PAINLEVEL_OUTOF10: NO PAIN (0)

## 2025-07-14 NOTE — NURSING NOTE
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       Vitals were taken, medications reconciled     Dante Gordon , Clinic Assistant     1:40 PM

## 2025-07-14 NOTE — PROGRESS NOTES
Edema  New  Weight gain  Abd bloating  NIV - April, stopped in June  Lightheadedness in AM  If dont eat or dink

## 2025-07-14 NOTE — PATIENT INSTRUCTIONS
Neuro Cardiomyopathy   Cardiology Providers you saw during your visit: Renetta Hawley CNP    Medication changes:   -take torsemide 10 mg daily    Follow up:  -RN check in later this week to see how your fluid status is  -CORE with Jonna Blount on 9/17 for fluid check  -Dr. Ashton in January to be coordinated with neuromuscular appointment    Labs:  -BMP next week if we continue torsemide  -recheck CK lab    Follow the American Heart Association Diet and Lifestyle recommendations:  Limit saturated fat, trans fat, sodium, red meat, sweets and sugar-sweetened beverages. If you choose to eat red meat, compare labels and select the leanest cuts available.  Aim for at least 150 minutes of moderate physical activity or 75 minutes of vigorous physical activity - or an equal combination of both - each week.      During business hours: [phone], press option # 1 to schedule or leave a message for your care team      After hours, weekends or holidays: On Call Cardiologist- [phone]   option #4 and ask to speak to the on-call Cardiologist.    Joan Tolbert RN BSN  Cardiology Nurse Coordinator - Heart Failure Clinic  [facility]  [phone] option 1 to schedule an appointment or leave a message for your care team

## 2025-07-14 NOTE — NURSING NOTE
Labs: Patient was given results of the laboratory testing obtained today. Patient was instructed to return for the next laboratory testing in 1 week if he stays on torsemide. Patient demonstrated understanding of this information and agreed to call with further questions or concerns.     Med Reconcile: Reviewed and verified all current medications with the patient. The updated medication list was printed and given to the patient.    New Medication: Patient was educated regarding newly prescribed medication, including discussion of  the indication, administration, side effects, and when to report to MD or RN. Patient demonstrated understanding of this information and agreed to call with further questions or concerns. Start torsemide 10 mg daily.    Return Appointment: Patient given instructions regarding scheduling next clinic visit. Patient demonstrated understanding of this information and agreed to call with further questions or concerns. CORE in September.    Patient stated he understood all health information given and agreed to call with further questions or concerns.    Joan Tolbert, RN

## 2025-07-14 NOTE — PROGRESS NOTES
[facility]  NEUROCARDIOMYOPATHY CLINIC    HPI:   Mr. Lara is a 69 year old male with myotonic dystrophy type 2 associated cardiomyopathy. Presents for follow-up in neurocardiomyopathy clinic. 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 He also has a family history of myotonic dystrophy in his sister and also probably his mother and maternal grandfather.  He had an echocardiogram in  and was incidentally idenitied to have atrial flutter which showed an LVEF of 40-45%. In  he had a successful cardioversion for atrial flutter. He had a echo in 2009 which showed an LVEF of 50-55% and normal RV function. He had a nuclear stress test in  which showed thinning at the inferior base and no ischemia.      Last CMR 2025 demonstrates an LVEF of 46%, RVEF of 65%, LGE and a thin basal to mid anteroseptal stripe and inferolateral septal epicardial LGE and a nonischemic pattern is approximately 3% of the LV.  There is slight decrement of the LVEF compared to prior MRIs from 52 to 46%. He has been recommended NIV due to the reduced MIP.     e recently had a trip when he was at a hotel due to falling on carpet and he had no significant injuries.  He is getting back to biking for 5 to 10 minutes at a time now.  He denies any chest pain at rest or with exertion.  He does note that walking faster at the airport or carrying bags and walking up stairs does result in some dyspnea though he is largely limited by muscle strength.  He feels he may have gained a few pounds, but denies edema, orthopnea, or PND.  Denies palpitations, presyncope, or syncope.  He does have some occasional lightheadedness.  He notes that his appetite is good and his energy levels are unchanged.     His sister who had myotonic dystrophy type II and was 1 to 2 years younger than him has .  He notes that she had an autoimmune disease as well  as well as plexopathy and had significant leg pain.  She was not known to have any specific cardiovascular dysfunction.     Since last visit, ***    Assessment and Plan:   Mr. Lara is a 69 year old male with atrial flutter and a mytonic dystrophy type 2 associated cardiomyopathy and progressive bifasicular block now status post CRT-P.       1. 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 Mild non ischemic, biventricular systolic heart failure  3. History of presyncope  3. History of atrial flutter  4. Bifascicular block s/p CRT-P 5/2023  5. Wenckebach   6. Hypertension history   7.  Reduced maximal inspiratory pressure on PFTs     Overall, Mr. Rajni Lara continues to do well.  He has had some progression of his muscle dysfunction but continues to be able to do exercise.  He is largely limited by muscle weakness rather than dyspnea.  He does have myotonic dystrophy associated cardiomyopathy.  His EF is largely unchanged and is mildly reduced.  While difficult to assess given his muscle symptoms he is largely class II.  He is euvolemic and well compensated.  He has normal endorgan function.  His N-terminal proBNP is normal however he continues to have an elevated high-sensitivity troponin T which can be seen in patients with muscular dystrophies.  He is tolerating low-dose losartan without symptoms.  Given his fibrosis and reduced EF we discussed starting spironolactone which he is agreeable to start.  I am starting this preferentially over a beta-blocker given his bifascicular block.  Will repeat a BMP in 1 to 2 weeks locally.     On device interrogation, he has V pacing 11.3% and has corresponding BiV pacing of 11.2%, which is effectively 99% BiV pacing of the time he is pacing, which is appropriate for the indication.      Additionally given his reduced maximal inspiratory pressure, in agreement with Dr. Cano regarding early noninvasive ventilation as this also has cardiac  benefits.          # Chronic systolic heart failure/HFrEF secondary to   # HFmrEF (EF ***)  # ***    Stage {UMPCARDSTAGE:582396881}. NYHA Class {UMPCARDSYMP:275755345}.     -Fluid status: {UMPCARDFLUID:255210390}  -ACEi/ARB/ARNi/afterload reduction: {UMPCARDACEI/ARB:853562650}  -BB: {UMPCARDACEI/ARB:}  -Aldosterone antagonist: {UMPCARDBB:577515273}  -SGLT2i: ***  -SCD prophylaxis: {UMPCARDSCD:817650741}  -NSAID use: contraindicated  -Sleep apnea evaluation: {SLEEP APNEA SIGN AND SYMPTOMS:984592}  -Remote monitoring: ***  -Genetic testing: ***  -Surveillance imaging: ***  -Rhythm monitoring: ***    # ***  -***    # ***  -***    # ***  -***    Follow  up ***      Renetta Hawley DNP, NP-C  Nurse Practitioner in Advanced Heart Failure/Cardiac Transplant/Cardiovascular Genetics       *** minutes spent on the date of the encounter doing chart review, history and exam, documentation and further activities per the note      PAST MEDICAL HISTORY:  Past Medical History:   Diagnosis Date    Myotonic muscular dystrophy (H) 2021       FAMILY HISTORY:   His family is from [facility]. He has multiple maternal family members with myotonic dystrophy.   Mom when 70 when falling and missing steps and in her 80s she was bedbound.   Sister is a physician and has MD2  Maternal grand father had a heart attack 59, and  and also had some leg weakness  Maternal uncle sudden death at age 64  Son and younger daughter tested negative. Older daughter is positive for MD2        SOCIAL HISTORY:  Social History     Socioeconomic History    Marital status:    Tobacco Use    Smoking status: Never    Smokeless tobacco: Never   Substance and Sexual Activity    Alcohol use: Yes     Comment: ocassionally     Social Drivers of Health     Financial Resource Strain: Low Risk  (2024)    Received from [email or web address]    Overall Financial Resource Strain (CARDIA)     Difficulty of Paying Living Expenses: Not hard at all    Food Insecurity: No Food Insecurity (9/30/2024)    [facility]    Hunger Vital Sign     Worried About Running Out of Food in the Last Year: Never true     Ran Out of Food in the Last Year: Never true   Transportation Needs: No Transportation Needs (9/30/2024)    [facility]    Transportation Needs     In the past 12 months, has lack of transportation kept you from medical appointments, meetings, work, or from getting medicines or things needed for daily living?: No   Physical Activity: Unknown (9/30/2024)    [facility]    Exercise Vital Sign     Days of Exercise per Week: 0 days     Minutes of Exercise per Session: Patient declined   Stress: No Stress Concern Present (9/30/2024)    [facility] from [facility]    [employer] [place] - Occupational Stress Questionnaire     Feeling of Stress : Only a little   Social Connections: Socially Integrated (9/30/2024)    Received from [facility]    Social Connection and Isolation Panel [NHANES]     Frequency of Communication with Friends and Family: More than three times a week     Frequency of Social Gatherings with Friends and Family: More than three times a week     Attends Sikhism Services: More than 4 times per year     Active Member of Clubs or Organizations: Yes     Attends Club or Organization Meetings: Never     Marital Status:    Interpersonal Safety: Not At Risk (9/30/2024)    [facility] from [facility]    Humiliation, Afraid, Rape, and Kick questionnaire     Fear of Current or Ex-Partner: No     Emotionally Abused: No     Physically Abused: No     Sexually Abused: No   Housing Stability: Low Risk  (9/30/2024)    [facility] from [facility]    Housing Stability Vital Sign     Unable to Pay for Housing in the Last Year: No     Number of Times Moved in  the Last Year: 0     Homeless in the Last Year: No       CURRENT MEDICATIONS:  Current Outpatient Medications   Medication Sig Dispense Refill    acetaminophen (TYLENOL) 500 MG tablet Take 1,000 mg by mouth      finasteride (PROSCAR) 5 MG tablet Take 1 tablet by mouth every morning      losartan (COZAAR) 25 MG tablet Take 0.5 tablets (12.5 mg) by mouth daily 45 tablet 3    naproxen sodium 220 MG capsule Take 440 mg by mouth as needed (as needed)      nystatin-triamcinolone (MYCOLOG) 535347-3.1 UNIT/GM-% external ointment Apply topically 2 times daily      sildenafil (REVATIO) 20 MG tablet Take 20 mg by mouth daily as needed      spironolactone (ALDACTONE) 25 MG tablet Take 0.5 tablets (12.5 mg) by mouth daily. 45 tablet 3    tamsulosin (FLOMAX) 0.4 MG capsule TAKE ONE CAPSULE BY MOUTH AT BEDTIME       No current facility-administered medications for this visit.       ROS:   Refer to HPI    EXAM:  There were no vitals taken for this visit.  GENERAL: Appears comfortable, in no acute distress.   HEENT: Eye symmetrical, no discharge or icterus bilaterally. Mucous membranes moist and without lesions.  CV: RRR***, +S1S2, *** murmur, rub, or gallop. JVP ***.   RESPIRATORY: Respirations regular, even, and unlabored. Lungs CTA throughout. ***  GI: Soft*** and non distended with normoactive bowel sounds present in all quadrants. No tenderness, rebound, guarding. No hepatomegaly.   EXTREMITIES: *** peripheral edema. 2+ bilateral pedal pulses.   NEUROLOGIC: Alert and oriented x 3. No focal deficits.   MUSCULOSKELETAL: No joint swelling or tenderness.   SKIN: No jaundice. No rashes or lesions.     Labs, reviewed with patient in clinic today:  CBC RESULTS:  Lab Results   Component Value Date    WBC 7.6 01/27/2025    RBC 4.86 01/27/2025    HGB 15.4 01/27/2025    HCT 44.2 01/27/2025    MCV 91 01/27/2025    MCH 31.7 01/27/2025    MCHC 34.8 01/27/2025    RDW 13.4 01/27/2025     01/27/2025       CMP RESULTS:  Lab Results  "  Component Value Date     01/27/2025    POTASSIUM 4.8 01/27/2025    POTASSIUM 4.8 04/25/2022    CHLORIDE 105 03/19/2025    CO2 27 01/27/2025    CO2 28 04/25/2022    ANIONGAP 8 01/27/2025    ANIONGAP 6 04/25/2022     (H) 01/27/2025    GLC 98 04/25/2022    BUN 19.9 01/27/2025    BUN 18 04/25/2022    CR 0.44 (L) 01/27/2025    GFRESTIMATED >90 01/27/2025    JESSENIA 9.4 01/27/2025    BILITOTAL 0.6 01/27/2025    ALBUMIN 4.0 01/27/2025    ALBUMIN 4.3 04/25/2022    ALKPHOS 79 01/27/2025    ALT 30 01/27/2025    AST 36 01/27/2025        INR RESULTS:  No results found for: \"INR\"    Lab Results   Component Value Date    MAG 2.0 01/27/2025     No results found for: \"NTBNPI\"  Lab Results   Component Value Date    NTBNP 444 01/27/2025       BIOMARKERS:  No results found for: \"CKTOTAL\", \"CKMB\", \"TROPN\"    Diagnostics:  Echo result w/o MOPS: Interpretation SummaryLeft ventricular function is decreased. The ejection fraction is 45-50%(mildly reduced). Grade I or early diastolic dysfunction. Mild diffusehypokinesis is present.Global right ventricular function is normal. The right ventricle is normalsize.No significant valvular abnormalities.The estimated PA systolic pressure is 35 mmHg.IVC diameter >2.1 cm collapsing <50% with sniff suggests a high RA pressureestimated at 15 mmHg or greater.This study was compared with the study from 05/02/2022. The RA pressure ishigher. No significant changes in the biventricular function.              ALICIA MELGAR    "

## 2025-07-14 NOTE — LETTER
2025      RE: Rajni Lara  [address] [facility] [address]       Dear Colleague,    Thank you for the opportunity to participate in the care of your patient, Rajni Lara, at the [facility] [place] [facility]. Please see a copy of my visit note below.        [facility]  NEUROCARDIOMYOPATHY CLINIC    HPI:   Mr. Lara is a 69 year old male with myotonic dystrophy type 2 associated cardiomyopathy. Presents for follow-up in neurocardiomyopathy clinic. Patient has genetically confirmed MD type 2 with >75 CCTG repeats of the CNBP gene. He also has a family history of myotonic dystrophy in his sister and also probably his mother and maternal grandfather.  He had an echocardiogram in  and was incidentally idenitied to have atrial flutter which showed an LVEF of 40-45%. In  he had a successful cardioversion for atrial flutter. He had a echo in 2009 which showed an LVEF of 50-55% and normal RV function. He had a nuclear stress test in  which showed thinning at the inferior base and no ischemia.      Last CMR 2025 demonstrates an LVEF of 46%, RVEF of 65%, LGE and a thin basal to mid anteroseptal stripe and inferolateral septal epicardial LGE and a nonischemic pattern is approximately 3% of the LV.  There is slight decrement of the LVEF compared to prior MRIs from 52 to 46%. He has been recommended NIV due to the reduced MIP.      His sister who had myotonic dystrophy type II and was 1 to 2 years younger than him has .  He notes that she had an autoimmune disease as well as well as plexopathy and had significant leg pain.  She was not known to have any specific cardiovascular dysfunction.     Since last visit, he notes new LE edema. He has also experienced weight gain since last visit. Endorses abd bloating, appetite has been fine. Denies diarrhea, nausea, vomiting, abd pain. He was previously  on NIV but stopped this in June. He has some lightheadedness in mornings but gets better throughout the day. No chest pain, syncope, palpitations.     Assessment and Plan:   Mr. Lara is a 69 year old male with atrial flutter and a mytonic dystrophy type 2 associated cardiomyopathy and progressive bifasicular block now status post CRT-P.       1. 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 Mild non ischemic, biventricular systolic heart failure  3. History of presyncope  3. History of atrial flutter  4. Wenckebach, Bifascicular block s/p CRT-P 5/2023  5. Hypertension history   6.  Reduced maximal inspiratory pressure on PFTs     Overall, Mr. Rajni Lara appears well. He is however, fluid up on exam and by symptoms. NT pro BNP is also elevated 482 (from 294 last year). Will start low dose diuretic and monitor. He has normal end organ function. He does have chronic mildly elevated troponin due to MD.     From an MD standpoint be has had some progression of his muscle dysfunction but continues to be able to do exercise.  He is largely limited by muscle weakness rather than dyspnea.      On CRT-p interrogation, BiV pacing 19.2% - demand BVP per EP orders.      # Acute on chronic HFmrEF (EF 46%) secondary to   # Myotonic dystrophy type 2    Stage C. NYHA Class IIIB.     -Fluid status: hypervolemic - start torsemide 10 mg daily, if this improves symptoms, continue this and get BMP in 7-10 days  -ACEi/ARB/ARNi/afterload reduction: losartan 12.5 mg daily  -BB: not indicated EF >40%  -Aldosterone antagonist: mirna 12.5 mg daily  -SGLT2i: deferred today  -SCD prophylaxis: does not meet criteria for implant, has CRT-p  -NSAID use: contraindicated  -Sleep apnea evaluation: follows with sleep medicine  -Remote monitoring: deferred  -Genetic testing: completed  -Surveillance imaging: CMR due 1/2026  -Rhythm monitoring: yaritza with next Dr Ashton visit    Follow  up in [facility] or with me in  September      Renetta Hawley DNP, NP-C  Nurse Practitioner in Advanced Heart Failure/Cardiac Transplant/Cardiovascular Genetics       40 minutes spent on the date of the encounter doing chart review, history and exam, documentation and further activities per the note      PAST MEDICAL HISTORY:  Past Medical History:   Diagnosis Date     Myotonic muscular dystrophy (H) 2021       FAMILY HISTORY:   His family is from [facility]. He has multiple maternal family members with myotonic dystrophy.   Mom when 70 when falling and missing steps and in her 80s she was bedbound.   Sister is a physician and has MD2  Maternal grand father had a heart attack 59, and  and also had some leg weakness  Maternal uncle sudden death at age 64  Son and younger daughter tested negative. Older daughter is positive for MD2       SOCIAL HISTORY:  Social History     Socioeconomic History     Marital status:    Tobacco Use     Smoking status: Never     Smokeless tobacco: Never   Substance and Sexual Activity     Alcohol use: Yes     Comment: ocassionally     Social Drivers of Health     Financial Resource Strain: Low Risk  (2024)    Received from [employer] and [facility]    Overall Financial Resource Strain (CARDIA)      Difficulty of Paying Living Expenses: Not hard at all   Food Insecurity: No Food Insecurity (2024)    [facility]    Hunger Vital Sign      Worried About Running Out of Food in the Last Year: Never true      Ran Out of Food in the Last Year: Never true   Transportation Needs: No Transportation Needs (2024)    Received from [facility] and [facility]    Transportation Needs      In the past 12 months, has lack of transportation kept you from medical appointments, meetings, work, or from getting medicines or things needed for daily living?: No   Physical Activity: Unknown (2024)    Received from [facility] and [facility]    Exercise Vital Sign       Days of Exercise per Week: 0 days      Minutes of Exercise per Session: Patient declined   Stress: No Stress Concern Present (9/30/2024)    Received from [facility]    [employer] - Occupational Stress Questionnaire      Feeling of Stress : Only a little   Social Connections: Socially Integrated (9/30/2024)    Received from [facility]    Social Connection and Isolation Panel [NHANES]      Frequency of Communication with Friends and Family: More than three times a week      Frequency of Social Gatherings with Friends and Family: More than three times a week      Attends Confucianism Services: More than 4 times per year      Active Member of Clubs or Organizations: Yes      Attends Club or Organization Meetings: Never      Marital Status:    Interpersonal Safety: Not At Risk (9/30/2024)    [facility]    Humiliation, Afraid, Rape, and Kick questionnaire      Fear of Current or Ex-Partner: No      Emotionally Abused: No      Physically Abused: No      Sexually Abused: No   Housing Stability: Low Risk  (9/30/2024)    [facility]    Housing Stability Vital Sign      Unable to Pay for Housing in the Last Year: No      Number of Times Moved in the Last Year: 0      Homeless in the Last Year: No       CURRENT MEDICATIONS:  Current Outpatient Medications   Medication Sig Dispense Refill     acetaminophen (TYLENOL) 500 MG tablet Take 1,000 mg by mouth       finasteride (PROSCAR) 5 MG tablet Take 1 tablet by mouth every morning       losartan (COZAAR) 25 MG tablet Take 0.5 tablets (12.5 mg) by mouth daily 45 tablet 3     naproxen sodium 220 MG capsule Take 440 mg by mouth as needed (as needed)       nystatin-triamcinolone (MYCOLOG) 109875-8.1 UNIT/GM-% external ointment Apply topically 2 times daily       sildenafil (REVATIO) 20 MG tablet Take 20 mg by mouth daily as needed        spironolactone (ALDACTONE) 25 MG tablet Take 0.5 tablets (12.5 mg) by mouth daily. 45 tablet 3     tamsulosin (FLOMAX) 0.4 MG capsule TAKE ONE CAPSULE BY MOUTH AT BEDTIME       No current facility-administered medications for this visit.       ROS:   Refer to HPI    EXAM:  /69 (BP Location: Right arm, Patient Position: Chair, Cuff Size: Adult Regular)   Pulse 60   Wt 109.1 kg (240 lb 9.6 oz)   SpO2 95%   BMI 30.89 kg/m    GENERAL: Appears comfortable, in no acute distress.   HEENT: Eye symmetrical, no discharge or icterus bilaterally. Mucous membranes moist and without lesions.  CV: RRR, +S1S2, no murmur, rub, or gallop. JVP elevated at 90 degrees.   RESPIRATORY: Respirations regular, even, and unlabored. Lungs CTA throughout.   GI: Soft and non distended with normoactive bowel sounds present in all quadrants. No tenderness, rebound, guarding. No hepatomegaly.   EXTREMITIES: 1+ peripheral edema. 2+ bilateral pedal pulses.   NEUROLOGIC: Alert and oriented x 3. No focal deficits.   MUSCULOSKELETAL: No joint swelling or tenderness.   SKIN: No jaundice. No rashes or lesions.     Labs, reviewed with patient in clinic today:  CBC RESULTS:  Lab Results   Component Value Date    WBC 7.6 01/27/2025    RBC 4.86 01/27/2025    HGB 15.4 01/27/2025    HCT 44.2 01/27/2025    MCV 91 01/27/2025    MCH 31.7 01/27/2025    MCHC 34.8 01/27/2025    RDW 13.4 01/27/2025     01/27/2025       CMP RESULTS:  Lab Results   Component Value Date     01/27/2025    POTASSIUM 4.8 01/27/2025    POTASSIUM 4.8 04/25/2022    CHLORIDE 105 03/19/2025    CO2 27 01/27/2025    CO2 28 04/25/2022    ANIONGAP 8 01/27/2025    ANIONGAP 6 04/25/2022     (H) 01/27/2025    GLC 98 04/25/2022    BUN 19.9 01/27/2025    BUN 18 04/25/2022    CR 0.44 (L) 01/27/2025    GFRESTIMATED >90 01/27/2025    JESSENIA 9.4 01/27/2025    BILITOTAL 0.6 01/27/2025    ALBUMIN 4.0 01/27/2025    ALBUMIN 4.3 04/25/2022    ALKPHOS 79 01/27/2025    ALT 30 01/27/2025  "   AST 36 01/27/2025        INR RESULTS:  No results found for: \"INR\"    Lab Results   Component Value Date    MAG 2.0 01/27/2025     No results found for: \"NTBNPI\"  Lab Results   Component Value Date    NTBNP 444 01/27/2025       BIOMARKERS:  No results found for: \"CKTOTAL\", \"CKMB\", \"TROPN\"    Diagnostics:  Echo result w/o MOPS: Interpretation SummaryLeft ventricular function is decreased. The ejection fraction is 45-50%(mildly reduced). Grade I or early diastolic dysfunction. Mild diffusehypokinesis is present.Global right ventricular function is normal. The right ventricle is normalsize.No significant valvular abnormalities.The estimated PA systolic pressure is 35 mmHg.IVC diameter >2.1 cm collapsing <50% with sniff suggests a high RA pressureestimated at 15 mmHg or greater.This study was compared with the study from 05/02/2022. The RA pressure ishigher. No significant changes in the biventricular function.              CC  DANILO, ALICIA D      Edema  New  Weight gain  Abd bloating  NIV - April, stopped in June  Lightheadedness in AM  If dont eat or dink        Please do not hesitate to contact me if you have any questions/concerns.     Sincerely,     AUGUSTO Saldivar CNP  "

## 2025-07-14 NOTE — PATIENT INSTRUCTIONS
It was a pleasure to see you in clinic today.  Please do not hesitate to call with any questions or concerns.  We look forward to seeing you in clinic at your next device check in 3 months.    Yana Bird, RN, MS, CCRN  Electrophysiology Nurse Clinician  [facility]    During Business Hours Please Call:  [phone]  After Hours Please Call:  [phone] - select option #[phone] and ask for job code [phone]

## 2025-07-18 LAB
MDC_IDC_EPISODE_DTM: NORMAL
MDC_IDC_EPISODE_DURATION: 117 S
MDC_IDC_EPISODE_DURATION: 127 S
MDC_IDC_EPISODE_DURATION: 14 S
MDC_IDC_EPISODE_DURATION: 28 S
MDC_IDC_EPISODE_DURATION: 31 S
MDC_IDC_EPISODE_DURATION: 751 S
MDC_IDC_EPISODE_DURATION: 79 S
MDC_IDC_EPISODE_ID: 1745
MDC_IDC_EPISODE_ID: 1746
MDC_IDC_EPISODE_ID: 1747
MDC_IDC_EPISODE_ID: 1748
MDC_IDC_EPISODE_ID: 1749
MDC_IDC_EPISODE_ID: NORMAL
MDC_IDC_EPISODE_TYPE: NORMAL
MDC_IDC_LEAD_CONNECTION_STATUS: NORMAL
MDC_IDC_LEAD_IMPLANT_DT: NORMAL
MDC_IDC_LEAD_LOCATION: NORMAL
MDC_IDC_LEAD_LOCATION_DETAIL_1: NORMAL
MDC_IDC_LEAD_MFG: NORMAL
MDC_IDC_LEAD_MODEL: NORMAL
MDC_IDC_LEAD_POLARITY_TYPE: NORMAL
MDC_IDC_LEAD_SERIAL: NORMAL
MDC_IDC_LEAD_SPECIAL_FUNCTION: NORMAL
MDC_IDC_MSMT_BATTERY_DTM: NORMAL
MDC_IDC_MSMT_BATTERY_REMAINING_LONGEVITY: 142 MO
MDC_IDC_MSMT_BATTERY_RRT_TRIGGER: 2.6
MDC_IDC_MSMT_BATTERY_STATUS: NORMAL
MDC_IDC_MSMT_BATTERY_VOLTAGE: 3.02 V
MDC_IDC_MSMT_LEADCHNL_LV_IMPEDANCE_VALUE: 1007 OHM
MDC_IDC_MSMT_LEADCHNL_LV_IMPEDANCE_VALUE: 1083 OHM
MDC_IDC_MSMT_LEADCHNL_LV_IMPEDANCE_VALUE: 1254 OHM
MDC_IDC_MSMT_LEADCHNL_LV_IMPEDANCE_VALUE: 342 OHM
MDC_IDC_MSMT_LEADCHNL_LV_IMPEDANCE_VALUE: 437 OHM
MDC_IDC_MSMT_LEADCHNL_LV_IMPEDANCE_VALUE: 513 OHM
MDC_IDC_MSMT_LEADCHNL_LV_IMPEDANCE_VALUE: 646 OHM
MDC_IDC_MSMT_LEADCHNL_LV_IMPEDANCE_VALUE: 760 OHM
MDC_IDC_MSMT_LEADCHNL_LV_IMPEDANCE_VALUE: 817 OHM
MDC_IDC_MSMT_LEADCHNL_LV_IMPEDANCE_VALUE: 855 OHM
MDC_IDC_MSMT_LEADCHNL_LV_PACING_THRESHOLD_AMPLITUDE: 1 V
MDC_IDC_MSMT_LEADCHNL_LV_PACING_THRESHOLD_PULSEWIDTH: 0.8 MS
MDC_IDC_MSMT_LEADCHNL_RA_IMPEDANCE_VALUE: 342 OHM
MDC_IDC_MSMT_LEADCHNL_RA_IMPEDANCE_VALUE: 456 OHM
MDC_IDC_MSMT_LEADCHNL_RA_PACING_THRESHOLD_AMPLITUDE: 0.5 V
MDC_IDC_MSMT_LEADCHNL_RA_PACING_THRESHOLD_PULSEWIDTH: 0.4 MS
MDC_IDC_MSMT_LEADCHNL_RA_SENSING_INTR_AMPL: 3.38 MV
MDC_IDC_MSMT_LEADCHNL_RA_SENSING_INTR_AMPL: 4.25 MV
MDC_IDC_MSMT_LEADCHNL_RV_IMPEDANCE_VALUE: 361 OHM
MDC_IDC_MSMT_LEADCHNL_RV_IMPEDANCE_VALUE: 437 OHM
MDC_IDC_MSMT_LEADCHNL_RV_PACING_THRESHOLD_AMPLITUDE: 0.5 V
MDC_IDC_MSMT_LEADCHNL_RV_PACING_THRESHOLD_PULSEWIDTH: 0.4 MS
MDC_IDC_MSMT_LEADCHNL_RV_SENSING_INTR_AMPL: 13 MV
MDC_IDC_MSMT_LEADCHNL_RV_SENSING_INTR_AMPL: 13.62 MV
MDC_IDC_PG_IMPLANT_DTM: NORMAL
MDC_IDC_PG_MFG: NORMAL
MDC_IDC_PG_MODEL: NORMAL
MDC_IDC_PG_SERIAL: NORMAL
MDC_IDC_PG_TYPE: NORMAL
MDC_IDC_SESS_CLINIC_NAME: NORMAL
MDC_IDC_SESS_DTM: NORMAL
MDC_IDC_SESS_TYPE: NORMAL
MDC_IDC_SET_BRADY_AT_MODE_SWITCH_RATE: 171 {BEATS}/MIN
MDC_IDC_SET_BRADY_LOWRATE: 50 {BEATS}/MIN
MDC_IDC_SET_BRADY_MAX_SENSOR_RATE: 130 {BEATS}/MIN
MDC_IDC_SET_BRADY_MAX_TRACKING_RATE: 130 {BEATS}/MIN
MDC_IDC_SET_BRADY_MODE: NORMAL
MDC_IDC_SET_BRADY_PAV_DELAY_HIGH: 100 MS
MDC_IDC_SET_BRADY_PAV_DELAY_LOW: 350 MS
MDC_IDC_SET_BRADY_SAV_DELAY_HIGH: 70 MS
MDC_IDC_SET_BRADY_SAV_DELAY_LOW: 300 MS
MDC_IDC_SET_CRT_LVRV_DELAY: 0 MS
MDC_IDC_SET_CRT_PACED_CHAMBERS: NORMAL
MDC_IDC_SET_LEADCHNL_LV_PACING_AMPLITUDE: 1.5 V
MDC_IDC_SET_LEADCHNL_LV_PACING_ANODE_ELECTRODE_1: NORMAL
MDC_IDC_SET_LEADCHNL_LV_PACING_ANODE_LOCATION_1: NORMAL
MDC_IDC_SET_LEADCHNL_LV_PACING_CAPTURE_MODE: NORMAL
MDC_IDC_SET_LEADCHNL_LV_PACING_CATHODE_ELECTRODE_1: NORMAL
MDC_IDC_SET_LEADCHNL_LV_PACING_CATHODE_LOCATION_1: NORMAL
MDC_IDC_SET_LEADCHNL_LV_PACING_POLARITY: NORMAL
MDC_IDC_SET_LEADCHNL_LV_PACING_PULSEWIDTH: 0.8 MS
MDC_IDC_SET_LEADCHNL_RA_PACING_AMPLITUDE: 1.5 V
MDC_IDC_SET_LEADCHNL_RA_PACING_ANODE_ELECTRODE_1: NORMAL
MDC_IDC_SET_LEADCHNL_RA_PACING_ANODE_LOCATION_1: NORMAL
MDC_IDC_SET_LEADCHNL_RA_PACING_CAPTURE_MODE: NORMAL
MDC_IDC_SET_LEADCHNL_RA_PACING_CATHODE_ELECTRODE_1: NORMAL
MDC_IDC_SET_LEADCHNL_RA_PACING_CATHODE_LOCATION_1: NORMAL
MDC_IDC_SET_LEADCHNL_RA_PACING_POLARITY: NORMAL
MDC_IDC_SET_LEADCHNL_RA_PACING_PULSEWIDTH: 0.4 MS
MDC_IDC_SET_LEADCHNL_RA_SENSING_ANODE_ELECTRODE_1: NORMAL
MDC_IDC_SET_LEADCHNL_RA_SENSING_ANODE_LOCATION_1: NORMAL
MDC_IDC_SET_LEADCHNL_RA_SENSING_CATHODE_ELECTRODE_1: NORMAL
MDC_IDC_SET_LEADCHNL_RA_SENSING_CATHODE_LOCATION_1: NORMAL
MDC_IDC_SET_LEADCHNL_RA_SENSING_POLARITY: NORMAL
MDC_IDC_SET_LEADCHNL_RA_SENSING_SENSITIVITY: 0.3 MV
MDC_IDC_SET_LEADCHNL_RV_PACING_AMPLITUDE: 2 V
MDC_IDC_SET_LEADCHNL_RV_PACING_ANODE_ELECTRODE_1: NORMAL
MDC_IDC_SET_LEADCHNL_RV_PACING_ANODE_LOCATION_1: NORMAL
MDC_IDC_SET_LEADCHNL_RV_PACING_CAPTURE_MODE: NORMAL
MDC_IDC_SET_LEADCHNL_RV_PACING_CATHODE_ELECTRODE_1: NORMAL
MDC_IDC_SET_LEADCHNL_RV_PACING_CATHODE_LOCATION_1: NORMAL
MDC_IDC_SET_LEADCHNL_RV_PACING_POLARITY: NORMAL
MDC_IDC_SET_LEADCHNL_RV_PACING_PULSEWIDTH: 0.4 MS
MDC_IDC_SET_LEADCHNL_RV_SENSING_ANODE_ELECTRODE_1: NORMAL
MDC_IDC_SET_LEADCHNL_RV_SENSING_ANODE_LOCATION_1: NORMAL
MDC_IDC_SET_LEADCHNL_RV_SENSING_CATHODE_ELECTRODE_1: NORMAL
MDC_IDC_SET_LEADCHNL_RV_SENSING_CATHODE_LOCATION_1: NORMAL
MDC_IDC_SET_LEADCHNL_RV_SENSING_POLARITY: NORMAL
MDC_IDC_SET_LEADCHNL_RV_SENSING_SENSITIVITY: 0.9 MV
MDC_IDC_SET_ZONE_DETECTION_INTERVAL: 350 MS
MDC_IDC_SET_ZONE_DETECTION_INTERVAL: 400 MS
MDC_IDC_SET_ZONE_STATUS: NORMAL
MDC_IDC_SET_ZONE_STATUS: NORMAL
MDC_IDC_SET_ZONE_TYPE: NORMAL
MDC_IDC_SET_ZONE_VENDOR_TYPE: NORMAL
MDC_IDC_STAT_AT_BURDEN_PERCENT: 0.1 %
MDC_IDC_STAT_AT_DTM_END: NORMAL
MDC_IDC_STAT_AT_DTM_START: NORMAL
MDC_IDC_STAT_BRADY_AP_VP_PERCENT: 2.24 %
MDC_IDC_STAT_BRADY_AP_VS_PERCENT: 4.46 %
MDC_IDC_STAT_BRADY_AS_VP_PERCENT: 16.99 %
MDC_IDC_STAT_BRADY_AS_VS_PERCENT: 76.3 %
MDC_IDC_STAT_BRADY_DTM_END: NORMAL
MDC_IDC_STAT_BRADY_DTM_START: NORMAL
MDC_IDC_STAT_BRADY_RA_PERCENT_PACED: 7.55 %
MDC_IDC_STAT_BRADY_RV_PERCENT_PACED: 19.23 %
MDC_IDC_STAT_CRT_DTM_END: NORMAL
MDC_IDC_STAT_CRT_DTM_START: NORMAL
MDC_IDC_STAT_CRT_LV_PERCENT_PACED: 19.18 %
MDC_IDC_STAT_CRT_PERCENT_PACED: 19.18 %
MDC_IDC_STAT_EPISODE_RECENT_COUNT: 0
MDC_IDC_STAT_EPISODE_RECENT_COUNT_DTM_END: NORMAL
MDC_IDC_STAT_EPISODE_RECENT_COUNT_DTM_START: NORMAL
MDC_IDC_STAT_EPISODE_TOTAL_COUNT: 0
MDC_IDC_STAT_EPISODE_TOTAL_COUNT: 1
MDC_IDC_STAT_EPISODE_TOTAL_COUNT: 24
MDC_IDC_STAT_EPISODE_TOTAL_COUNT_DTM_END: NORMAL
MDC_IDC_STAT_EPISODE_TOTAL_COUNT_DTM_START: NORMAL
MDC_IDC_STAT_EPISODE_TYPE: NORMAL
MDC_IDC_STAT_TACHYTHERAPY_RECENT_DTM_END: NORMAL
MDC_IDC_STAT_TACHYTHERAPY_RECENT_DTM_START: NORMAL
MDC_IDC_STAT_TACHYTHERAPY_TOTAL_DTM_END: NORMAL
MDC_IDC_STAT_TACHYTHERAPY_TOTAL_DTM_START: NORMAL

## 2025-07-24 ENCOUNTER — TELEPHONE (OUTPATIENT)
Dept: CARDIOLOGY | Facility: CLINIC | Age: 70
End: 2025-07-24
Payer: COMMERCIAL

## 2025-07-24 NOTE — TELEPHONE ENCOUNTER
Health Call Center    Phone Message    May a detailed message be left on voicemail: yes     Reason for Call: Order(s): Other:   Reason for requested: CK/ BMP orders  Date needed: whenever possible  Provider name: Anna Hawley      Please resend lab orders to [facility] in [place].  They stated they have not received and pt wants to make sure he gets completed in timely manner.      Action Taken: Message routed to:  [facility] cardio    Travel Screening: Not Applicable     Date of Service:

## 2025-08-04 ENCOUNTER — MYC MEDICAL ADVICE (OUTPATIENT)
Dept: CARDIOLOGY | Facility: CLINIC | Age: 70
End: 2025-08-04
Payer: COMMERCIAL

## (undated) DEVICE — 7FR X 13CM SAFESHEATH II TEAR-AWAY VALVED SHEATH SYSTEM, WITH SIDE PORT, 0.038IN GUIDEWIRE, 19.5CM DILATOR

## (undated) DEVICE — GUIDEWIRE VASCULAR 0.035IN DIA 145CML 15CML/6CML STAINLESS

## (undated) DEVICE — CABLE PACING ALLIGATOR CLIP 12FT 5833SL

## (undated) DEVICE — GUIDEWIRE PT FLOPPY STRAIGHT 182X35CMX.014IN H74912160012

## (undated) DEVICE — CATH ANGIO WEDGE PRESSURE 6FRX110CM DL AI-07126

## (undated) DEVICE — GUIDEWIRE VASCULAR CHOICE PT EXTRA RAIL SUPPORT H7491216101

## (undated) DEVICE — SLITTER ADJSTBL 6232ADJ

## (undated) DEVICE — SHEATH WORLEY STD BRAIDED 40CM

## (undated) DEVICE — Device

## (undated) DEVICE — ELECTRODE DEFIB CADENCE 22550R

## (undated) DEVICE — INTRO SHEATH MICRO PLATINUM TIP 4FRX40CM 7274

## (undated) DEVICE — CATH GD 57CM, 90-DEG SHORT CUR

## (undated) RX ORDER — REGADENOSON 0.08 MG/ML
INJECTION, SOLUTION INTRAVENOUS
Status: DISPENSED
Start: 2022-07-18

## (undated) RX ORDER — CEFAZOLIN SODIUM 1 G/3ML
INJECTION, POWDER, FOR SOLUTION INTRAMUSCULAR; INTRAVENOUS
Status: DISPENSED
Start: 2023-05-01

## (undated) RX ORDER — LIDOCAINE HYDROCHLORIDE 20 MG/ML
INJECTION, SOLUTION INFILTRATION; PERINEURAL
Status: DISPENSED
Start: 2023-05-01

## (undated) RX ORDER — AMINOPHYLLINE 25 MG/ML
INJECTION, SOLUTION INTRAVENOUS
Status: DISPENSED
Start: 2022-07-18

## (undated) RX ORDER — FENTANYL CITRATE 50 UG/ML
INJECTION, SOLUTION INTRAMUSCULAR; INTRAVENOUS
Status: DISPENSED
Start: 2023-05-01

## (undated) RX ORDER — ACETAMINOPHEN 325 MG/1
TABLET ORAL
Status: DISPENSED
Start: 2023-05-01

## (undated) RX ORDER — ONDANSETRON 2 MG/ML
INJECTION INTRAMUSCULAR; INTRAVENOUS
Status: DISPENSED
Start: 2023-05-01

## (undated) RX ORDER — SODIUM CHLORIDE 9 MG/ML
INJECTION, SOLUTION INTRAVENOUS
Status: DISPENSED
Start: 2023-05-01